# Patient Record
Sex: MALE | Race: WHITE | NOT HISPANIC OR LATINO | Employment: UNEMPLOYED | URBAN - METROPOLITAN AREA
[De-identification: names, ages, dates, MRNs, and addresses within clinical notes are randomized per-mention and may not be internally consistent; named-entity substitution may affect disease eponyms.]

---

## 2019-10-14 ENCOUNTER — HOSPITAL ENCOUNTER (INPATIENT)
Facility: HOSPITAL | Age: 44
LOS: 5 days | DRG: 044 | End: 2019-10-19
Attending: ANESTHESIOLOGY | Admitting: INTERNAL MEDICINE
Payer: COMMERCIAL

## 2019-10-14 ENCOUNTER — HOSPITAL ENCOUNTER (EMERGENCY)
Facility: HOSPITAL | Age: 44
Discharge: OTHER INSTITUTION | End: 2019-10-14
Payer: COMMERCIAL

## 2019-10-14 ENCOUNTER — APPOINTMENT (EMERGENCY)
Dept: CT IMAGING | Facility: HOSPITAL | Age: 44
End: 2019-10-14
Payer: COMMERCIAL

## 2019-10-14 ENCOUNTER — APPOINTMENT (EMERGENCY)
Dept: RADIOLOGY | Facility: HOSPITAL | Age: 44
End: 2019-10-14
Payer: COMMERCIAL

## 2019-10-14 VITALS
HEIGHT: 67 IN | RESPIRATION RATE: 18 BRPM | HEART RATE: 84 BPM | OXYGEN SATURATION: 98 % | SYSTOLIC BLOOD PRESSURE: 198 MMHG | WEIGHT: 152 LBS | DIASTOLIC BLOOD PRESSURE: 112 MMHG | BODY MASS INDEX: 23.86 KG/M2

## 2019-10-14 DIAGNOSIS — G43.909 MIGRAINE: ICD-10-CM

## 2019-10-14 DIAGNOSIS — I72.6 VERTEBRAL ARTERY ANEURYSM (HCC): ICD-10-CM

## 2019-10-14 DIAGNOSIS — I62.9 INTRACRANIAL HEMORRHAGE (HCC): Primary | ICD-10-CM

## 2019-10-14 DIAGNOSIS — R53.1 LEFT-SIDED WEAKNESS: ICD-10-CM

## 2019-10-14 DIAGNOSIS — I61.9 INTRAPARENCHYMAL HEMORRHAGE OF BRAIN (HCC): Primary | ICD-10-CM

## 2019-10-14 DIAGNOSIS — I10 ESSENTIAL HYPERTENSION: ICD-10-CM

## 2019-10-14 DIAGNOSIS — R45.851 SUICIDAL IDEATION: ICD-10-CM

## 2019-10-14 LAB
ALBUMIN SERPL BCP-MCNC: 4.7 G/DL (ref 3.5–5.7)
ALP SERPL-CCNC: 58 U/L (ref 40–150)
ALT SERPL W P-5'-P-CCNC: 26 U/L (ref 7–52)
AMPHETAMINES SERPL QL SCN: NEGATIVE
ANION GAP SERPL CALCULATED.3IONS-SCNC: 8 MMOL/L (ref 4–13)
APTT PPP: 37 SECONDS (ref 23–37)
AST SERPL W P-5'-P-CCNC: 19 U/L (ref 13–39)
BARBITURATES UR QL: NEGATIVE
BENZODIAZ UR QL: NEGATIVE
BILIRUB SERPL-MCNC: 0.6 MG/DL (ref 0.2–1)
BUN SERPL-MCNC: 13 MG/DL (ref 7–25)
CALCIUM SERPL-MCNC: 9.5 MG/DL (ref 8.6–10.5)
CHLORIDE SERPL-SCNC: 105 MMOL/L (ref 98–107)
CO2 SERPL-SCNC: 28 MMOL/L (ref 21–31)
COCAINE UR QL: NEGATIVE
CREAT SERPL-MCNC: 1.18 MG/DL (ref 0.7–1.3)
ERYTHROCYTE [DISTWIDTH] IN BLOOD BY AUTOMATED COUNT: 13.7 % (ref 11.5–14.5)
GFR SERPL CREATININE-BSD FRML MDRD: 76 ML/MIN/1.73SQ M
GLUCOSE SERPL-MCNC: 101 MG/DL (ref 65–99)
GLUCOSE SERPL-MCNC: 89 MG/DL (ref 65–140)
HCT VFR BLD AUTO: 43.6 % (ref 42–47)
HGB BLD-MCNC: 15.3 G/DL (ref 14–18)
INR PPP: 1.03 (ref 0.9–1.5)
MCH RBC QN AUTO: 31.2 PG (ref 26–34)
MCHC RBC AUTO-ENTMCNC: 35.1 G/DL (ref 31–37)
MCV RBC AUTO: 89 FL (ref 81–99)
METHADONE UR QL: NEGATIVE
OPIATES UR QL SCN: NEGATIVE
PCP UR QL: NEGATIVE
PLATELET # BLD AUTO: 147 THOUSANDS/UL (ref 149–390)
PMV BLD AUTO: 7.3 FL (ref 8.6–11.7)
POTASSIUM SERPL-SCNC: 3.6 MMOL/L (ref 3.5–5.5)
PROT SERPL-MCNC: 6.9 G/DL (ref 6.4–8.9)
PROTHROMBIN TIME: 12 SECONDS (ref 10.2–13)
RBC # BLD AUTO: 4.9 MILLION/UL (ref 4.3–5.9)
SODIUM SERPL-SCNC: 141 MMOL/L (ref 134–143)
THC UR QL: NEGATIVE
TROPONIN I SERPL-MCNC: <0.03 NG/ML
WBC # BLD AUTO: 5.2 THOUSAND/UL (ref 4.8–10.8)

## 2019-10-14 PROCEDURE — 36415 COLL VENOUS BLD VENIPUNCTURE: CPT

## 2019-10-14 PROCEDURE — 80307 DRUG TEST PRSMV CHEM ANLYZR: CPT | Performed by: INTERNAL MEDICINE

## 2019-10-14 PROCEDURE — 99221 1ST HOSP IP/OBS SF/LOW 40: CPT | Performed by: ANESTHESIOLOGY

## 2019-10-14 PROCEDURE — 96375 TX/PRO/DX INJ NEW DRUG ADDON: CPT

## 2019-10-14 PROCEDURE — 70496 CT ANGIOGRAPHY HEAD: CPT

## 2019-10-14 PROCEDURE — 70498 CT ANGIOGRAPHY NECK: CPT

## 2019-10-14 PROCEDURE — 85730 THROMBOPLASTIN TIME PARTIAL: CPT

## 2019-10-14 PROCEDURE — 82948 REAGENT STRIP/BLOOD GLUCOSE: CPT

## 2019-10-14 PROCEDURE — 85027 COMPLETE CBC AUTOMATED: CPT

## 2019-10-14 PROCEDURE — 93005 ELECTROCARDIOGRAM TRACING: CPT

## 2019-10-14 PROCEDURE — 71045 X-RAY EXAM CHEST 1 VIEW: CPT

## 2019-10-14 PROCEDURE — 84484 ASSAY OF TROPONIN QUANT: CPT

## 2019-10-14 PROCEDURE — 99291 CRITICAL CARE FIRST HOUR: CPT

## 2019-10-14 PROCEDURE — 85610 PROTHROMBIN TIME: CPT

## 2019-10-14 PROCEDURE — 96365 THER/PROPH/DIAG IV INF INIT: CPT

## 2019-10-14 PROCEDURE — 80053 COMPREHEN METABOLIC PANEL: CPT

## 2019-10-14 RX ORDER — ONDANSETRON 2 MG/ML
1 INJECTION INTRAMUSCULAR; INTRAVENOUS ONCE
Status: COMPLETED | OUTPATIENT
Start: 2019-10-14 | End: 2019-10-14

## 2019-10-14 RX ORDER — ONDANSETRON 2 MG/ML
INJECTION INTRAMUSCULAR; INTRAVENOUS
Status: COMPLETED
Start: 2019-10-14 | End: 2019-10-14

## 2019-10-14 RX ORDER — ACETAMINOPHEN 325 MG/1
650 TABLET ORAL ONCE
Status: DISCONTINUED | OUTPATIENT
Start: 2019-10-14 | End: 2019-10-15

## 2019-10-14 RX ORDER — SODIUM CHLORIDE 9 MG/ML
125 INJECTION, SOLUTION INTRAVENOUS CONTINUOUS
Status: DISCONTINUED | OUTPATIENT
Start: 2019-10-14 | End: 2019-10-14 | Stop reason: HOSPADM

## 2019-10-14 RX ORDER — SODIUM CHLORIDE, SODIUM LACTATE, POTASSIUM CHLORIDE, CALCIUM CHLORIDE 600; 310; 30; 20 MG/100ML; MG/100ML; MG/100ML; MG/100ML
100 INJECTION, SOLUTION INTRAVENOUS CONTINUOUS
Status: DISCONTINUED | OUTPATIENT
Start: 2019-10-14 | End: 2019-10-15

## 2019-10-14 RX ORDER — FENTANYL CITRATE 50 UG/ML
25 INJECTION, SOLUTION INTRAMUSCULAR; INTRAVENOUS
Status: DISCONTINUED | OUTPATIENT
Start: 2019-10-14 | End: 2019-10-16

## 2019-10-14 RX ORDER — ONDANSETRON 2 MG/ML
4 INJECTION INTRAMUSCULAR; INTRAVENOUS EVERY 4 HOURS PRN
Status: DISCONTINUED | OUTPATIENT
Start: 2019-10-14 | End: 2019-10-16

## 2019-10-14 RX ORDER — LABETALOL 20 MG/4 ML (5 MG/ML) INTRAVENOUS SYRINGE
10 ONCE
Status: COMPLETED | OUTPATIENT
Start: 2019-10-14 | End: 2019-10-14

## 2019-10-14 RX ORDER — ACETAMINOPHEN, ASPIRIN AND CAFFEINE 250; 250; 65 MG/1; MG/1; MG/1
2 TABLET, FILM COATED ORAL EVERY 6 HOURS PRN
COMMUNITY
End: 2019-10-19 | Stop reason: HOSPADM

## 2019-10-14 RX ORDER — FENTANYL CITRATE 50 UG/ML
25 INJECTION, SOLUTION INTRAMUSCULAR; INTRAVENOUS ONCE
Status: COMPLETED | OUTPATIENT
Start: 2019-10-14 | End: 2019-10-14

## 2019-10-14 RX ORDER — BUTALBITAL, ACETAMINOPHEN AND CAFFEINE 50; 325; 40 MG/1; MG/1; MG/1
1 TABLET ORAL EVERY 4 HOURS PRN
Status: DISCONTINUED | OUTPATIENT
Start: 2019-10-14 | End: 2019-10-16

## 2019-10-14 RX ADMIN — SODIUM CHLORIDE 5 MG/HR: 0.9 INJECTION, SOLUTION INTRAVENOUS at 17:49

## 2019-10-14 RX ADMIN — SODIUM CHLORIDE, SODIUM LACTATE, POTASSIUM CHLORIDE, AND CALCIUM CHLORIDE 100 ML/HR: .6; .31; .03; .02 INJECTION, SOLUTION INTRAVENOUS at 17:49

## 2019-10-14 RX ADMIN — ONDANSETRON 4 MG: 2 INJECTION INTRAMUSCULAR; INTRAVENOUS at 18:35

## 2019-10-14 RX ADMIN — SODIUM CHLORIDE 7.5 MG/HR: 0.9 INJECTION, SOLUTION INTRAVENOUS at 22:01

## 2019-10-14 RX ADMIN — FENTANYL CITRATE 25 MCG: 50 INJECTION INTRAMUSCULAR; INTRAVENOUS at 18:22

## 2019-10-14 RX ADMIN — SODIUM CHLORIDE 125 ML/HR: 0.9 INJECTION, SOLUTION INTRAVENOUS at 15:16

## 2019-10-14 RX ADMIN — SODIUM CHLORIDE 2.5 MG/HR: 9 INJECTION, SOLUTION INTRAVENOUS at 15:25

## 2019-10-14 RX ADMIN — LEVETIRACETAM 1000 MG: 100 INJECTION, SOLUTION INTRAVENOUS at 15:26

## 2019-10-14 RX ADMIN — LABETALOL 20 MG/4 ML (5 MG/ML) INTRAVENOUS SYRINGE 10 MG: at 15:53

## 2019-10-14 RX ADMIN — LABETALOL 20 MG/4 ML (5 MG/ML) INTRAVENOUS SYRINGE 10 MG: at 15:42

## 2019-10-14 RX ADMIN — FENTANYL CITRATE 25 MCG: 50 INJECTION INTRAMUSCULAR; INTRAVENOUS at 21:58

## 2019-10-14 NOTE — H&P
History and Physical - Critical Care   Karen Fragoso 45 y o  male MRN: [id number]  Unit/Bed#: ICU 03 Encounter: [id number]    Reason for Admission / Chief Complaint: Left-sided facial droop and arm weakness    History of Present Illness:  Karen Fragoso is a 45 y o  male with a past medical history of migraine headaches and depression who presents as a transfer from the [address] [place] [address] Emergency Department  Patient originally presented for evaluation of acute onset left-sided facial weakness and left arm weakness of 20 minutes' duration  Stated he "Didn't feel right    Something was wrong" while lifting weights this morning  Upon initial evaluation, patient stated that symptoms were unchanged from onset  Denied ever experiencing similar symptoms  Denied loss of consciousness, fevers, headaches, chest pain, shortness of breath, nausea, vomiting, and abdominal pain  Denied current alcohol use; denied ever using tobacco products or recreational drugs  Endorses family history of hypertension in sister  Initial vital signs: HR 94, RR 16, /129, 98% O2 saturation on room air  Initial laboratory was unremarkable; PT, PTT, and INR within normal limits; negative troponin  Initial chest x-ray revealed no acute cardiopulmonary disease  CT headrevealed acute intraparenchymal hemorrhage centered at the right lentiform nucleus with mild surrounding vasogenic edema; no significant midline shift  CTA head/neck unremarkable  NIHSS score of 2; ICH score of 0  Patient was subsequently transferred to [facility] for evaluation and management of intraparenchymal brain hemorrhage  Currently endorses right eye pain with associated tearing (not the same as his usual headaches) and drowsiness   Denies headaches, fevers, chills, vision changes, lightheadedness, dizziness, chest pain, and shortness of breath    ______________________________________________________________________    Assessment:  Principal Problem: Intraparenchymal hemorrhage of brain (HCC)  Active Problems:    Left-sided weakness  Resolved Problems:    * No resolved hospital problems  *    Plan:    Neuro: Intraparenchymal hemorrhage   - Likely secondary to prolonged uncontrolled hypertension   - Denies smoking history and recreational drug use   - Alert and oriented x4; GCS 15  - NIHSS 9, ICH 0  - Several left-sided neurological deficits (detailed in physical exam)  - Failed initial dysphagia assessment; keep NPO until formal speech evaluation   - 1x dose of IV fentanyl for headache   - Nicardipine infusion for SBP <140   - Lipid panel, HbA1c  - Repeat CT head without contrast at 24 hours   - Q1 neuro checks   - Neurology and neurosurgery consulted   - PT/OT evaluation when appropriate     CV: No acute issues   - Nicardipine infusion for SBP <140   - Will require education on tight blood pressure control as an outpatient     Pulm: No acute issues   - Currently saturating 98% on room air   - Incentive spirometry     GI: No acute issues   - Last bowel movement yesterday   - Denies diarrhea and constipation     : No acute issues   - Able to urinate   - Does not require catheterization     F/E/N: No acute issues   - Currently NPO following bedside dysphagia assessment   - Requires formal speech evaluation   - Lactated ringers infusion 100 ml/hr    ID: No acute issues   - Afebrile, no leukocytosis   - No recent illnesses   - Has not received flu vaccination     Heme: No acute issues   - No known hematologic conditions   - PT, PTT, and INR within normal limits     Endo: No acute issues   - No history of diabetes   - Initial blood glucose 101  - HbA1c pending     Msk/Skin: No acute issues   - Frequent repositioning   - Routine ulcer prophylaxis         Disposition: continue critical care monitoring         Counseling / Coordination of Care  Total Critical Care time spent 20 minutes excluding procedures, teaching and family updates  ______________________________________________________________________  Past Medical History:  Past Medical History:   Diagnosis Date    Depression     Migraine      Past Surgical History:  Past Surgical History:   Procedure Laterality Date    LITHOTRIPSY       Past Family History:  No family history on file  Social History:  Social History     Tobacco Use   Smoking Status Never Smoker   Smokeless Tobacco Never Used     Social History     Substance and Sexual Activity   Alcohol Use Not Currently     Social History     Substance and Sexual Activity   Drug Use Never     Medications:  Current Facility-Administered Medications   Medication Dose Route Frequency    ondansetron (ZOFRAN) 4 mg/2 mL injection **ADS Override Pull**        niCARdipine (CARDENE) 25 mg (STANDARD CONCENTRATION) in sodium chloride 0 9% 250 mL  1-15 mg/hr Intravenous Titrated    ondansetron (FOR EMS ONLY) (ZOFRAN) 4 mg/2 mL injection 4 mg  1 each Does not apply Once     Home medications:  Prior to Admission medications    Medication Sig Start Date End Date Taking? Authorizing Provider   aspirin-acetaminophen-caffeine (Catha Mirna) [phone] MG per tablet Take 2 tablets by mouth every 6 (six) hours as needed for headaches    Historical Provider, MD     Allergies:  No Known Allergies    ROS: Negative except as noted above     Vitals: There were no vitals filed for this visit  Temperature:   No data recorded  Weights: There is no height or weight on file to calculate BMI  Non-Invasive/Invasive Ventilation Settings:  Respiratory    Lab Data (Last 4 hours)    None         O2/Vent Data (Last 4 hours)    None               Physical Exam:  Physical Exam   Constitutional: He is oriented to person, place, and time  He appears well-developed and well-nourished  He appears distressed  Mild dysarthria noted    HENT:   Head: Normocephalic and atraumatic  Mouth/Throat: Oropharynx is clear and moist  No oropharyngeal exudate  Eyes: Pupils are equal, round, and reactive to light  Conjunctivae and EOM are normal  No scleral icterus  Neck: Normal range of motion  Neck supple  Cardiovascular: Normal rate, regular rhythm, normal heart sounds and intact distal pulses  Exam reveals no gallop and no friction rub  No murmur heard  Bounding peripheral pulses palpable bilaterally    Pulmonary/Chest: Effort normal and breath sounds normal  No stridor  No respiratory distress  He has no wheezes  Abdominal: Soft  Bowel sounds are normal  He exhibits no distension and no mass  There is no tenderness  Musculoskeletal: He exhibits no edema or tenderness  Neurological: He is alert and oriented to person, place, and time  He displays abnormal reflex  A cranial nerve deficit and sensory deficit is present  Bilateral left visual field loss; complete loss of sensation over left forehead, cheek, and chin; reduced tone of left masseter and temporalis muscles; left facial droop with inability to raise left eye brow, wrinkle left forehead, and squeeze left eye shut; left-sided tongue deviation (unable to deviate tongue to right side; weak shoulder shrug on left side; flaccid paralysis of left upper extremity with complete sensation loss; 3/5 left lower extremity strength, complete sensation loss of left lower extremity, and left-sided patellar hyperreflexia; able to wiggle left toes slightly on command    Skin: Skin is warm and dry  Capillary refill takes 2 to 3 seconds  No rash noted  He is not diaphoretic  No erythema        Labs:  Results from last 7 days   Lab Units 10/14/19  1500   WBC Thousand/uL 5 20   HEMOGLOBIN g/dL 15 3   HEMATOCRIT % 43 6   PLATELETS Thousands/uL 147*     Results from last 7 days   Lab Units 10/14/19  1500   SODIUM mmol/L 141   POTASSIUM mmol/L 3 6   CHLORIDE mmol/L 105   CO2 mmol/L 28   ANION GAP mmol/L 8   BUN mg/dL 13   CREATININE mg/dL 1 18   CALCIUM mg/dL 9 5   GLUCOSE RANDOM mg/dL 101*   ALT U/L 26   AST U/L 19 ALK PHOS U/L 58   ALBUMIN g/dL 4 7   TOTAL BILIRUBIN mg/dL 0 60     Results from last 7 days   Lab Units 10/14/19  1500   INR  1 03   PTT seconds 37      Results from last 7 days   Lab Units 10/14/19  1500   TROPONIN I ng/mL <0 03     Imaging:     (10/14/2019) chest x-ray: No acute cardiopulmonary disease     (10/14/2019) CT stroke alert brain: Acute intraparenchymal hemorrhage centered at the right lentiform nucleus with mild surrounding vasogenic edema; no significant midline shift     (10/14/2019) CTA stroke alert (head/neck): Unremarkable CT angiogram of the brain; no CTA spot sign; unremarkable CT angiogram of the neck     EKG: Non-specific ST segment and T wave changes    Micro: None at this time    VTE Pharmacologic Prophylaxis: Pharmacologic VTE Prophylaxis contraindicated due to intraparenchymal hemorrhage   VTE Mechanical Prophylaxis: sequential compression device    Invasive lines and devices: Invasive Devices     Peripheral Intravenous Line            Peripheral IV 10/14/19 Left Forearm less than 1 day    Peripheral IV 10/14/19 Right Antecubital less than 1 day              Code Status: Level 1 - Full Code     Given critical illness, patient length of stay will require greater than two midnights  Portions of the record may have been created with voice recognition software  Occasional wrong word or "sound a like" substitutions may have occurred due to the inherent limitations of voice recognition software  Read the chart carefully and recognize, using context, where substitutions have occurred      Angela Owusu MD

## 2019-10-14 NOTE — ED PROVIDER NOTES
History  Chief Complaint   Patient presents with   [address]     last known well 1 hour ago, left facial droop, slurred speech     Yanick Hyde is a 79-year-old male who came to the emergency department due to left-sided facial weakness and left arm weakness with started about 20 minutes prior to arrival   Patient has known history of hypertension and depression  History provided by:  Patient   used: No    STROKE Alert   Location:  Left-sided facial droop and left arm weakness  Quality:  As above  Severity:  Mild  Onset quality:  Sudden  Duration:  20 minutes  Timing:  Constant  Progression:  Unchanged  Chronicity:  New  Associated symptoms: no abdominal pain, no chest pain, no congestion, no cough, no diarrhea, no ear pain, no fatigue, no fever, no headaches, no loss of consciousness, no myalgias, no nausea, no rash, no rhinorrhea, no shortness of breath, no sore throat, no vomiting and no wheezing        Prior to Admission Medications   Prescriptions Last Dose Informant Patient Reported? Taking?   aspirin-acetaminophen-caffeine (Christiano Bellamy) [phone] MG per tablet 10/13/2019 at Unknown time Self Yes Yes   Sig: Take 2 tablets by mouth every 6 (six) hours as needed for headaches      Facility-Administered Medications: None       Past Medical History:   Diagnosis Date    Depression     Migraine        Past Surgical History:   Procedure Laterality Date    LITHOTRIPSY         History reviewed  No pertinent family history  I have reviewed and agree with the history as documented  Social History     Tobacco Use    Smoking status: Never Smoker    Smokeless tobacco: Never Used   Substance Use Topics    Alcohol use: Not Currently    Drug use: Never        Review of Systems   Constitutional: Negative for fatigue and fever  HENT: Negative for congestion, ear pain, rhinorrhea and sore throat  Eyes: Negative      Respiratory: Negative for cough, shortness of breath and wheezing  Cardiovascular: Negative for chest pain  Gastrointestinal: Negative for abdominal pain, diarrhea, nausea and vomiting  Endocrine: Negative  Genitourinary: Negative  Musculoskeletal: Negative for myalgias  Skin: Negative for rash  Allergic/Immunologic: Negative  Neurological: Positive for facial asymmetry and weakness  Negative for loss of consciousness and headaches  Hematological: Negative  Psychiatric/Behavioral: Negative  Physical Exam  Physical Exam   Constitutional: He is oriented to person, place, and time  He appears well-developed and well-nourished  No distress  HENT:   Head: Normocephalic and atraumatic  Right Ear: External ear normal    Left Ear: External ear normal    Nose: Nose normal    Mouth/Throat: Oropharynx is clear and moist  No oropharyngeal exudate  Eyes: Pupils are equal, round, and reactive to light  Conjunctivae and EOM are normal  Right eye exhibits no discharge  Left eye exhibits no discharge  No scleral icterus  Neck: Normal range of motion  Neck supple  No tracheal deviation present  No thyromegaly present  Cardiovascular: Normal rate, regular rhythm and normal heart sounds  Pulmonary/Chest: Effort normal and breath sounds normal  No stridor  No respiratory distress  Abdominal: Soft  Bowel sounds are normal  He exhibits no distension  There is no tenderness  Musculoskeletal: Normal range of motion  He exhibits no edema, tenderness or deformity  Lymphadenopathy:     He has no cervical adenopathy  Neurological: He is alert and oriented to person, place, and time  No sensory deficit  Skin: Skin is warm and dry  No rash noted  He is not diaphoretic  No erythema  No pallor  Psychiatric: He has a normal mood and affect  His behavior is normal  Judgment and thought content normal    Nursing note and vitals reviewed        Vital Signs  ED Triage Vitals [10/14/19 1511]   Temp Pulse Respirations Blood Pressure SpO2   -- 94 16 (!) 208/129 98 %      Temp src Heart Rate Source Patient Position - Orthostatic VS BP Location FiO2 (%)   -- Monitor Lying Left arm --      Pain Score       5           Vitals:    10/14/19 1530 10/14/19 1536 10/14/19 1543 10/14/19 1545   BP:  (!) 210/116 (!) 198/112    Pulse: 85 82 88 84   Patient Position - Orthostatic VS:  Lying Lying          Visual Acuity      ED Medications  Medications   sodium chloride 0 9 % infusion (125 mL/hr Intravenous New Bag 10/14/19 1516)   iohexol (OMNIPAQUE) 350 MG/ML injection (MULTI-DOSE) 85 mL (has no administration in time range)   niCARdipine (CARDENE) 25 mg (STANDARD CONCENTRATION) in sodium chloride 0 9% 250 mL (5 mg/hr Intravenous Rate/Dose Change 10/14/19 1545)   levETIRAcetam (KEPPRA) 1,000 mg in sodium chloride 0 9 % 100 mL IVPB (0 mg Intravenous Stopped 10/14/19 1549)   Labetalol HCl (NORMODYNE) injection 10 mg (10 mg Intravenous Given 10/14/19 1542)   Labetalol HCl (NORMODYNE) injection 10 mg (10 mg Intravenous Given 10/14/19 1553)       Diagnostic Studies  Results Reviewed     Procedure Component Value Units Date/Time    Troponin I [[id number]]  (Normal) Collected:  10/14/19 1500    Lab Status:  Final result Specimen:  Blood Updated:  10/14/19 1521     Troponin I <0 03 ng/mL     Comprehensive metabolic panel [[id number]]  (Abnormal) Collected:  10/14/19 1500    Lab Status:  Final result Specimen:  Blood Updated:  10/14/19 1517     Sodium 141 mmol/L      Potassium 3 6 mmol/L      Chloride 105 mmol/L      CO2 28 mmol/L      ANION GAP 8 mmol/L      BUN 13 mg/dL      Creatinine 1 18 mg/dL      Glucose 101 mg/dL      Calcium 9 5 mg/dL      AST 19 U/L      ALT 26 U/L      Alkaline Phosphatase 58 U/L      Total Protein 6 9 g/dL      Albumin 4 7 g/dL      Total Bilirubin 0 60 mg/dL      eGFR 76 ml/min/1 73sq m     Narrative:       [place] guidelines for Chronic Kidney Disease (CKD):     Stage 1 with normal or high GFR (GFR > 90 mL/min/1 73 square meters)    Stage 2 Mild CKD (GFR = 60-89 mL/min/1 73 square meters)    Stage 3A Moderate CKD (GFR = 45-59 mL/min/1 73 square meters)    Stage 3B Moderate CKD (GFR = 30-44 mL/min/1 73 square meters)    Stage 4 Severe CKD (GFR = 15-29 mL/min/1 73 square meters)    Stage 5 End Stage CKD (GFR <15 mL/min/1 73 square meters)  Note: GFR calculation is accurate only with a steady state creatinine    Protime-INR [[id number]]  (Normal) Collected:  10/14/19 1500    Lab Status:  Final result Specimen:  Blood Updated:  10/14/19 1510     Protime 12 0 seconds      INR 1 03    APTT [[id number]]  (Normal) Collected:  10/14/19 1500    Lab Status:  Final result Specimen:  Blood Updated:  10/14/19 1510     PTT 37 seconds     CBC and Platelet [[id number]]  (Abnormal) Collected:  10/14/19 1500    Lab Status:  Final result Specimen:  Blood Updated:  10/14/19 1504     WBC 5 20 Thousand/uL      RBC 4 90 Million/uL      Hemoglobin 15 3 g/dL      Hematocrit 43 6 %      MCV 89 fL      MCH 31 2 pg      MCHC 35 1 g/dL      RDW 13 7 %      Platelets 371 Thousands/uL      MPV 7 3 fL     Fingerstick Glucose (POCT) [[id number]]  (Normal) Collected:  10/14/19 1445    Lab Status:  Final result Updated:  10/14/19 1446     POC Glucose 89 mg/dl                  X-ray chest 1 view portable   ED Interpretation by Pedro Luis Freitas MD (10/14 6715)   No infiltrate      CTA stroke alert (head/neck)   Final Result by Jose Luis Yoder MD (10/14 8504)      1  Unremarkable CT angiogram of the brain  No CTA spot sign  2   Unremarkable CT angiogram of the neck  Findings were directly discussed with Dr James Lu on 10/14/2019 3:20 PM                      Workstation performed: [id number]         CT stroke alert brain   Final Result by Jose Luis Yoder MD (10/14 0265)      Acute intraparenchymal hemorrhage centered at the right lentiform nucleus with mild surrounding vasogenic edema  No significant mass effect or midline shift              Findings were directly discussed with GRAHAM NAVARRO on 10/14/2019 2:59 PM       Workstation performed: [id number]                    Procedures  ECG 12 Lead Documentation Only  Date/Time: 10/14/2019 3:23 PM  Performed by: Zackery Zaman MD  Authorized by: Zackery Zaman MD     Indications / Diagnosis:  Intracranial bleed  ECG reviewed by me, the ED Provider: yes    Patient location:  ED  Previous ECG:     Previous ECG:  Unavailable    Comparison to cardiac monitor: Yes    Interpretation:     Interpretation: non-specific    Rate:     ECG rate:  95 beats per minute    ECG rate assessment: normal    Rhythm:     Rhythm: sinus rhythm    Ectopy:     Ectopy: none    QRS:     QRS axis:  Normal    QRS intervals:  Normal  Conduction:     Conduction: normal    ST segments:     ST segments:  Non-specific  T waves:     T waves: non-specific      CriticalCare Time  Performed by: Zackery Zaman MD  Authorized by: Zackery Zaman MD     Critical care provider statement:     Critical care time (minutes):  90    Critical care start time:  10/14/2019 2:41 PM    Critical care end time:  10/14/2019 4:05 PM    Critical care time was exclusive of:  Separately billable procedures and treating other patients    Critical care was necessary to treat or prevent imminent or life-threatening deterioration of the following conditions:  CNS failure or compromise    Critical care was time spent personally by me on the following activities:  Blood draw for specimens, obtaining history from patient or surrogate, development of treatment plan with patient or surrogate, discussions with consultants, evaluation of patient's response to treatment, examination of patient, ordering and performing treatments and interventions, ordering and review of laboratory studies, ordering and review of radiographic studies, re-evaluation of patient's condition and review of old charts    I assumed direction of critical care for this patient from another provider in my specialty: no ED Course  ED Course as of Oct 14 1606   Mon Oct 14, 2019   1447 Discussed with Dr Alexandra Mo, TeleNeurologist on call  [phone] Discussed with Dr Alexandra Mo, who saw the the CT Head and revealed bleed on the right basal ganglia  [phone] [facility] was called for transportation arrangements  [address] Discussed with Dr Alana Lyons, [address] and Accepting MD at [facility]                  Stroke Assessment     Row Name 10/14/19 1450             NIH Stroke Scale    Interval        Level of Consciousness (1a )  0      LOC Questions (1b )  0      LOC Commands (1c )  0      Best Gaze (2 )  0      Visual (3 )  0      Facial Palsy (4 )  1      Motor Arm, Left (5a )  1      Motor Arm, Right (5b )  0      Motor Leg, Left (6a )  0      Motor Leg, Right (6b )  0      Limb Ataxia (7 )  0      Sensory (8 )  0      Best Language (9 )  0      Dysarthria (10 )  0      Extinction and Inattention (11 ) (Formerly Neglect)  0      Total  2                          MDM  Number of Diagnoses or Management Options  Intracranial hemorrhage (HCC): new and requires workup     Amount and/or Complexity of Data Reviewed  Clinical lab tests: ordered and reviewed  Tests in the radiology section of CPT®: ordered and reviewed  Tests in the medicine section of CPT®: ordered and reviewed  Discussion of test results with the performing providers: yes  Decide to obtain previous medical records or to obtain history from someone other than the patient: yes  Obtain history from someone other than the patient: yes  Review and summarize past medical records: yes  Discuss the patient with other providers: yes  Independent visualization of images, tracings, or specimens: yes    Risk of Complications, Morbidity, and/or Mortality  Presenting problems: high  Diagnostic procedures: high  Management options: high    Patient Progress  Patient progress: other (comment) (Critical)      Disposition  Final diagnoses: Intracranial hemorrhage ([facility] [address] )     Time reflects when diagnosis was documented in both MDM as applicable and the Disposition within this note     Time User Action Codes Description Comment    10/14/2019  3:32 PM Feli Cuevas Add [I62 9] Intracranial hemorrhage [facility])       ED Disposition     ED Disposition Condition Date/Time Comment    Transfer to Another [facility] Oct 14, 2019  3:32 PM Angel Pimentel should be transferred out to Dr Girma Gutierrez accepting  MD Documentation      Most Recent Value   Patient Condition  An emergency transfer is being made prior to stabilization due to the need for definitive care and the benefit of transfer outweighs the risk   Reason for Transfer  Level of Care needed not available at this facility [facility]]   Benefits of Transfer  Specialized equipment and/or services available at the receiving facility (Include comment)________________________ [facility]]   Risks of Transfer  Potential for delay in receiving treatment, Potential deterioration of medical condition, Loss of IV, Increased discomfort during transfer, Possible worsening of condition or death during transfer   Accepting Physician  Dr Rosalie Mora Name, Garry Forbes   Sending JUWAN Ruffin     Provider Certification  General risk, such as traffic hazards, adverse weather conditions, rough terrain or turbulence, possible failure of equipment (including vehicle or aircraft), or consequences of actions of persons outside the control of the transport personnel, Unanticipated needs of medical equipment and personnel during transport, Risk of worsening condition, The possibility of a transport vehicle being unavailable      RN Documentation      Most [facility] Name, Garry Forbes   Level of Care  Advanced life support Follow-up Information    None         Patient's Medications   Discharge Prescriptions    No medications on file     No discharge procedures on file      ED Provider  Electronically Signed by           Annette Rodríguez MD  10/14/19 2469

## 2019-10-14 NOTE — QUICK NOTE
Stroke alert activated by Dr Jessika Dallas ([phone]) at [facility] ED via PACs  Patient is 45 with HTN  He came to ED with complaints of new onset L face and arm weakness, that began 20minutes prior to arrival to ED  Exam:   /129  Awake, alert, giving history  L facial, no other cranial nerve abnormalities  L arm pronator drift  No drift in any other extremities  CTH images reviewed: acute intraparenchymal hemorrhage in R lentiform nucleus 4 4x1 7x2 5cm, about 10cc  CTA images reviewed: unremarkable  A/P:   45year old man with HTN presenting with R basal ganglia hemorrhage, possibly secondary to HTN  Recommended BP lowering to goal of 140-160  No antiplatelet or anticoagulation use  Would obtain repeat CTH for stability, and MRI brain to assess for any underlying structural lesions

## 2019-10-14 NOTE — EMTALA/ACUTE CARE TRANSFER
[facility]  [address] [facility] [id number] [phone]  Dept: [phone]      EMTALA TRANSFER CONSENT    NAME Carlene BISWAS 1975                              MRN [id number]    I have been informed of my rights regarding examination, treatment, and transfer   by Dr Zackery Zaman MD    Benefits: Specialized equipment and/or services available at the receiving facility (Include comment)________________________(Critical Care Neurology)    Risks: Potential for delay in receiving treatment, Potential deterioration of medical condition, Loss of IV, Increased discomfort during transfer, Possible worsening of condition or death during transfer      Transfer Request   I acknowledge that my medical condition has been evaluated and explained to me by the emergency department physician or other qualified medical person and/or my attending physician who has recommended and offered to me further medical examination and treatment  I understand the Hospital's obligation with respect to the treatment and stabilization of my emergency medical condition  I nevertheless request to be transferred  I release the Hospital, the doctor, and any other persons caring for me from all responsibility or liability for any injury or ill effects that may result from my transfer and agree to accept all responsibility for the consequences of my choice to transfer, rather than receive stabilizing treatment at the Hospital  I understand that because the transfer is my request, my insurance may not provide reimbursement for the services  The Hospital will assist and direct me and my family in how to make arrangements for transfer, but the hospital is not liable for any fees charged by the transport service    In spite of this understanding, I refuse to consent to further medical examination and treatment which has been offered to me, and request transfer to  [address], [address] : [address] [place] [address]  I authorize the performance of emergency medical procedures and treatments upon me in both transit and upon arrival at the receiving facility  Additionally, I authorize the release of any and all medical records to the receiving facility and request they be transported with me, if possible  I authorize the performance of emergency medical procedures and treatments upon me in both transit and upon arrival at the receiving facility  Additionally, I authorize the release of any and all medical records to the receiving facility and request they be transported with me, if possible  I understand that the safest mode of transportation during a medical emergency is an ambulance and that the Hospital advocates the use of this mode of transport  Risks of traveling to the receiving facility by car, including absence of medical control, life sustaining equipment, such as oxygen, and medical personnel has been explained to me and I fully understand them  (MERLYN CORRECT BOX BELOW)  [ X ]  I consent to the stated transfer and to be transported by ambulance/helicopter  [  ]  I consent to the stated transfer, but refuse transportation by ambulance and accept full responsibility for my transportation by car  I understand the risks of non-ambulance transfers and I exonerate the Hospital and its staff from any deterioration in my condition that results from this refusal     X___________________________________________    DATE  10/14/19  TIME________  Signature of patient or legally responsible individual signing on patient behalf           RELATIONSHIP TO PATIENT_________________________          Provider Certification    NAME Pooja [facility] 1975                              MRN [id number]    A medical screening exam was performed on the above named patient    Based on the examination:    Condition Necessitating Transfer The encounter diagnosis was Intracranial hemorrhage ([facility] [address] )  Patient Condition: An emergency transfer is being made prior to stabilization due to the need for definitive care and the benefit of transfer outweighs the risk    Reason for Transfer: Level of Care needed not available at this facility(Critical Care Neurology)    Transfer Requirements: [address] [place], [place]   · Space available and qualified personnel available for treatment as acknowledged by    · Agreed to accept transfer and to provide appropriate medical treatment as acknowledged by       Dr Michael Dietrich  · Appropriate medical records of the examination and treatment of the patient are provided at the time of transfer   [address] [facility], [address] _______  · Transfer will be performed by qualified personnel from    and appropriate transfer equipment as required, including the use of necessary and appropriate life support measures      Provider Certification: I have examined the patient and explained the following risks and benefits of being transferred/refusing transfer to the patient/family:  General risk, such as traffic hazards, adverse weather conditions, rough terrain or turbulence, possible failure of equipment (including vehicle or aircraft), or consequences of actions of persons outside the control of the transport personnel, Unanticipated needs of medical equipment and personnel during transport, Risk of worsening condition, The possibility of a transport vehicle being unavailable      Based on these reasonable risks and benefits to the patient and/or the unborn child(toma), and based upon the information available at the time of the patients examination, I certify that the medical benefits reasonably to be expected from the provision of appropriate medical treatments at another medical facility outweigh the increasing risks, if any, to the individuals medical condition, and in the case of labor to the unborn child, from effecting the transfer      X                 GRAHAM Narayan MD               DATE 10/14/19        TIME_______      ORIGINAL - SEND TO MEDICAL RECORDS   COPY - SEND WITH PATIENT DURING TRANSFER

## 2019-10-15 ENCOUNTER — APPOINTMENT (INPATIENT)
Dept: RADIOLOGY | Facility: HOSPITAL | Age: 44
DRG: 044 | End: 2019-10-15
Payer: COMMERCIAL

## 2019-10-15 PROBLEM — R45.851 SUICIDAL IDEATION: Status: ACTIVE | Noted: 2019-10-15

## 2019-10-15 PROBLEM — F32.A DEPRESSION: Status: ACTIVE | Noted: 2019-10-15

## 2019-10-15 LAB
ANION GAP SERPL CALCULATED.3IONS-SCNC: 10 MMOL/L (ref 4–13)
ATRIAL RATE: 95 BPM
BUN SERPL-MCNC: 9 MG/DL (ref 5–25)
CALCIUM SERPL-MCNC: 8.8 MG/DL (ref 8.3–10.1)
CHLORIDE SERPL-SCNC: 104 MMOL/L (ref 100–108)
CHOLEST SERPL-MCNC: 126 MG/DL (ref 50–200)
CO2 SERPL-SCNC: 23 MMOL/L (ref 21–32)
CREAT SERPL-MCNC: 0.87 MG/DL (ref 0.6–1.3)
EST. AVERAGE GLUCOSE BLD GHB EST-MCNC: 74 MG/DL
GFR SERPL CREATININE-BSD FRML MDRD: 106 ML/MIN/1.73SQ M
GLUCOSE SERPL-MCNC: 107 MG/DL (ref 65–140)
HBA1C MFR BLD: 4.2 % (ref 4.2–6.3)
HCT VFR BLD AUTO: 42.4 % (ref 36.5–49.3)
HDLC SERPL-MCNC: 49 MG/DL (ref 40–60)
HGB BLD-MCNC: 15.2 G/DL (ref 12–17)
LDLC SERPL CALC-MCNC: 71 MG/DL (ref 0–100)
MAGNESIUM SERPL-MCNC: 1.9 MG/DL (ref 1.6–2.6)
P AXIS: 63 DEGREES
PHOSPHATE SERPL-MCNC: 3.1 MG/DL (ref 2.7–4.5)
POTASSIUM SERPL-SCNC: 3.6 MMOL/L (ref 3.5–5.3)
PR INTERVAL: 154 MS
QRS AXIS: 68 DEGREES
QRSD INTERVAL: 84 MS
QT INTERVAL: 344 MS
QTC INTERVAL: 432 MS
SODIUM SERPL-SCNC: 137 MMOL/L (ref 136–145)
T WAVE AXIS: -25 DEGREES
TRIGL SERPL-MCNC: 31 MG/DL
VENTRICULAR RATE: 95 BPM

## 2019-10-15 PROCEDURE — 80061 LIPID PANEL: CPT | Performed by: INTERNAL MEDICINE

## 2019-10-15 PROCEDURE — 93010 ELECTROCARDIOGRAM REPORT: CPT | Performed by: INTERNAL MEDICINE

## 2019-10-15 PROCEDURE — G8997 SWALLOW GOAL STATUS: HCPCS

## 2019-10-15 PROCEDURE — G8978 MOBILITY CURRENT STATUS: HCPCS

## 2019-10-15 PROCEDURE — 84100 ASSAY OF PHOSPHORUS: CPT | Performed by: INTERNAL MEDICINE

## 2019-10-15 PROCEDURE — 83036 HEMOGLOBIN GLYCOSYLATED A1C: CPT | Performed by: INTERNAL MEDICINE

## 2019-10-15 PROCEDURE — 99253 IP/OBS CNSLTJ NEW/EST LOW 45: CPT | Performed by: NEUROLOGICAL SURGERY

## 2019-10-15 PROCEDURE — G8979 MOBILITY GOAL STATUS: HCPCS

## 2019-10-15 PROCEDURE — G8987 SELF CARE CURRENT STATUS: HCPCS

## 2019-10-15 PROCEDURE — 97163 PT EVAL HIGH COMPLEX 45 MIN: CPT

## 2019-10-15 PROCEDURE — 80048 BASIC METABOLIC PNL TOTAL CA: CPT | Performed by: INTERNAL MEDICINE

## 2019-10-15 PROCEDURE — 92610 EVALUATE SWALLOWING FUNCTION: CPT

## 2019-10-15 PROCEDURE — 99254 IP/OBS CNSLTJ NEW/EST MOD 60: CPT | Performed by: PSYCHIATRY & NEUROLOGY

## 2019-10-15 PROCEDURE — 70551 MRI BRAIN STEM W/O DYE: CPT

## 2019-10-15 PROCEDURE — G8988 SELF CARE GOAL STATUS: HCPCS

## 2019-10-15 PROCEDURE — G8996 SWALLOW CURRENT STATUS: HCPCS

## 2019-10-15 PROCEDURE — 97167 OT EVAL HIGH COMPLEX 60 MIN: CPT

## 2019-10-15 PROCEDURE — 99233 SBSQ HOSP IP/OBS HIGH 50: CPT | Performed by: ANESTHESIOLOGY

## 2019-10-15 PROCEDURE — 70450 CT HEAD/BRAIN W/O DYE: CPT

## 2019-10-15 PROCEDURE — 99221 1ST HOSP IP/OBS SF/LOW 40: CPT | Performed by: PSYCHIATRY & NEUROLOGY

## 2019-10-15 PROCEDURE — 83735 ASSAY OF MAGNESIUM: CPT | Performed by: INTERNAL MEDICINE

## 2019-10-15 PROCEDURE — 85014 HEMATOCRIT: CPT | Performed by: STUDENT IN AN ORGANIZED HEALTH CARE EDUCATION/TRAINING PROGRAM

## 2019-10-15 PROCEDURE — 85018 HEMOGLOBIN: CPT | Performed by: STUDENT IN AN ORGANIZED HEALTH CARE EDUCATION/TRAINING PROGRAM

## 2019-10-15 RX ORDER — ACETAMINOPHEN 325 MG/1
650 TABLET ORAL EVERY 6 HOURS PRN
Status: DISCONTINUED | OUTPATIENT
Start: 2019-10-15 | End: 2019-10-15

## 2019-10-15 RX ORDER — HYDRALAZINE HYDROCHLORIDE 20 MG/ML
5 INJECTION INTRAMUSCULAR; INTRAVENOUS EVERY 4 HOURS PRN
Status: DISCONTINUED | OUTPATIENT
Start: 2019-10-15 | End: 2019-10-15

## 2019-10-15 RX ORDER — ACETAMINOPHEN 325 MG/1
975 TABLET ORAL 3 TIMES DAILY
Status: DISCONTINUED | OUTPATIENT
Start: 2019-10-15 | End: 2019-10-16

## 2019-10-15 RX ORDER — LABETALOL 20 MG/4 ML (5 MG/ML) INTRAVENOUS SYRINGE
10 EVERY 6 HOURS PRN
Status: DISCONTINUED | OUTPATIENT
Start: 2019-10-15 | End: 2019-10-15

## 2019-10-15 RX ORDER — PROMETHAZINE HYDROCHLORIDE 25 MG/ML
25 INJECTION, SOLUTION INTRAMUSCULAR; INTRAVENOUS EVERY 6 HOURS PRN
Status: DISCONTINUED | OUTPATIENT
Start: 2019-10-15 | End: 2019-10-16

## 2019-10-15 RX ORDER — HYDRALAZINE HYDROCHLORIDE 20 MG/ML
10 INJECTION INTRAMUSCULAR; INTRAVENOUS EVERY 4 HOURS PRN
Status: DISCONTINUED | OUTPATIENT
Start: 2019-10-15 | End: 2019-10-19 | Stop reason: HOSPADM

## 2019-10-15 RX ORDER — LABETALOL 20 MG/4 ML (5 MG/ML) INTRAVENOUS SYRINGE
10 EVERY 4 HOURS PRN
Status: DISCONTINUED | OUTPATIENT
Start: 2019-10-15 | End: 2019-10-16

## 2019-10-15 RX ORDER — LABETALOL 20 MG/4 ML (5 MG/ML) INTRAVENOUS SYRINGE
Status: COMPLETED
Start: 2019-10-15 | End: 2019-10-15

## 2019-10-15 RX ORDER — LISINOPRIL 5 MG/1
5 TABLET ORAL DAILY
Status: DISCONTINUED | OUTPATIENT
Start: 2019-10-15 | End: 2019-10-15

## 2019-10-15 RX ORDER — PROMETHAZINE HYDROCHLORIDE 25 MG/ML
25 INJECTION, SOLUTION INTRAMUSCULAR; INTRAVENOUS EVERY 6 HOURS PRN
Status: DISCONTINUED | OUTPATIENT
Start: 2019-10-15 | End: 2019-10-15

## 2019-10-15 RX ORDER — HYDRALAZINE HYDROCHLORIDE 20 MG/ML
5 INJECTION INTRAMUSCULAR; INTRAVENOUS ONCE AS NEEDED
Status: DISCONTINUED | OUTPATIENT
Start: 2019-10-15 | End: 2019-10-16

## 2019-10-15 RX ORDER — OLANZAPINE 2.5 MG/1
2.5 TABLET ORAL
Status: DISCONTINUED | OUTPATIENT
Start: 2019-10-15 | End: 2019-10-19 | Stop reason: HOSPADM

## 2019-10-15 RX ORDER — CITALOPRAM 20 MG/1
20 TABLET ORAL DAILY
Status: ON HOLD | COMMUNITY
End: 2019-10-23 | Stop reason: CLARIF

## 2019-10-15 RX ORDER — AMLODIPINE BESYLATE 5 MG/1
5 TABLET ORAL DAILY
Status: DISCONTINUED | OUTPATIENT
Start: 2019-10-15 | End: 2019-10-16

## 2019-10-15 RX ADMIN — FENTANYL CITRATE 25 MCG: 50 INJECTION INTRAMUSCULAR; INTRAVENOUS at 07:33

## 2019-10-15 RX ADMIN — ONDANSETRON 4 MG: 2 INJECTION INTRAMUSCULAR; INTRAVENOUS at 07:33

## 2019-10-15 RX ADMIN — ACETAMINOPHEN 975 MG: 325 TABLET ORAL at 21:19

## 2019-10-15 RX ADMIN — OLANZAPINE 2.5 MG: 2.5 TABLET, FILM COATED ORAL at 21:42

## 2019-10-15 RX ADMIN — FENTANYL CITRATE 25 MCG: 50 INJECTION INTRAMUSCULAR; INTRAVENOUS at 14:11

## 2019-10-15 RX ADMIN — FENTANYL CITRATE 25 MCG: 50 INJECTION INTRAMUSCULAR; INTRAVENOUS at 09:23

## 2019-10-15 RX ADMIN — ACETAMINOPHEN 975 MG: 325 TABLET ORAL at 16:28

## 2019-10-15 RX ADMIN — BUTALBITAL, ACETAMINOPHEN AND CAFFEINE 1 TABLET: 50; 325; 40 TABLET ORAL at 09:22

## 2019-10-15 RX ADMIN — FENTANYL CITRATE 25 MCG: 50 INJECTION INTRAMUSCULAR; INTRAVENOUS at 03:24

## 2019-10-15 RX ADMIN — SODIUM CHLORIDE 5 MG/HR: 0.9 INJECTION, SOLUTION INTRAVENOUS at 01:49

## 2019-10-15 RX ADMIN — LABETALOL 20 MG/4 ML (5 MG/ML) INTRAVENOUS SYRINGE 10 MG: at 17:11

## 2019-10-15 RX ADMIN — ONDANSETRON 4 MG: 2 INJECTION INTRAMUSCULAR; INTRAVENOUS at 14:39

## 2019-10-15 RX ADMIN — PROMETHAZINE HYDROCHLORIDE 25 MG: 25 INJECTION INTRAMUSCULAR; INTRAVENOUS at 09:24

## 2019-10-15 RX ADMIN — FENTANYL CITRATE 25 MCG: 50 INJECTION INTRAMUSCULAR; INTRAVENOUS at 00:24

## 2019-10-15 RX ADMIN — AMLODIPINE BESYLATE 5 MG: 5 TABLET ORAL at 10:01

## 2019-10-15 RX ADMIN — HYDRALAZINE HYDROCHLORIDE 5 MG: 20 INJECTION INTRAMUSCULAR; INTRAVENOUS at 21:17

## 2019-10-15 NOTE — CONSULTS
Consultation - Neurology   Grayson Vines 45 y o  male MRN: [id number]  Unit/Bed#: ICU 03 Encounter: [id number]      Assessment/Plan   Assessment:  A 45years old male with past medical history of white coat hypertension not on treatment and depression as well as chronic headache presented with left upper extremity weakness and left facial droop found to have right intraparenchymal hemorrhage current lately in the ICU on nicardipine  Plan:  # Right Lenticular IPH  · Secondary to likely poorly uncontrolled hypertension  · Control blood pressure 140-160  · Repeat CT head  · Non emergent MRI brain checking for PRESS or previous intraparenchymal hemorrhage  · Frequent neuro q 4 hours  · Phenergan 25 mg IV p r n  Q 6 for nausea and headache  · Speech eval noted past regular and thin liquids  · No antiplatelet or anticoagulation for now    # depression with suicidal ideas  Management for primary team    History of Present Illness     Reason for Consult / Principal Problem:  Right Intraparenchymal hemorrhage  Hx and PE limited by:  Drowsiness likely secondary to fentanyl  HPI: Grayson Vines is a 45 y o   male who presents with past medical history of depression with suicidal ideation, white coat hypertension and headache on Excedrin  Presented to  [facility] hospital 20 minutes after he noticed left upper extremity weakness  His symptoms started with left upper extremity numbness and inability to feel his left upper extremity  His symptoms progressed to left upper extremity weakness and feeling unwell  He drove himself to the emergency room using his right arm where his blood pressure was noted above 200 over 100  His CT scan head showed right lenticular hemorrhage  He was started on nicardipine drip and transferred to [place] for further workup and neurology consulted for further management      Since 2012 he reported frontal headache mainly at the back of the right high and was evaluated by Neurology and outpatient family medicine physician with multiple medications without improvement in his headache  His headache was the reason he dropped of his job as a  and was debilitating for him and had suicidal thoughts without plan because of his headache  He was using Excedrin as over-the-counter  He was never diagnosed as hypertension rather white coat hypertension but he did not follow up with primary care physician for over 5 years  He lives with his nephew and currently unemployed  He denied smoking, illicit drug use or alcohol use  His family history is noted for a sister with cardiac surgery in her childhood and horseshoe kidneys as well as hypertension in his parents  He denied having photophobia, phonophobia, fever, chills, shortness of breath or cough  His started having nausea and vomiting upon admission to the hospital   For the time of my exam he is alert and oriented has slight subjective improvement in his left arm weakness  His headache was 7/10  Inpatient consult to Neurology     Performed by  Scot Estevez MD     Authorized by Casper Kenney DO              Review of Systems   Constitutional: Negative for chills and fever  HENT: Negative for rhinorrhea and sore throat  Eyes: Negative for photophobia and visual disturbance  Respiratory: Negative for cough and shortness of breath  Gastrointestinal: Positive for nausea and vomiting  Endocrine: Negative for cold intolerance and heat intolerance  Genitourinary: Negative for difficulty urinating and dysuria  Musculoskeletal: Negative for neck stiffness  Skin: Negative for color change and rash  Neurological: Positive for facial asymmetry, weakness, numbness and headaches  Psychiatric/Behavioral: Positive for dysphoric mood and suicidal ideas         Historical Information   Past Medical History:   Diagnosis Date    Depression     Hypertension     Migraine      Past Surgical History:   Procedure Laterality Date    LITHOTRIPSY       Social History   Social History     Substance and Sexual Activity   Alcohol Use Not Currently     Social History     Substance and Sexual Activity   Drug Use Never     Social History     Tobacco Use   Smoking Status Never Smoker   Smokeless Tobacco Never Used     Family History: Horseshoe kidneys in his sister and hypertension in his parents as well as cardiac surgery in his sister    Review of previous medical records was  completed  Meds/Allergies   all current active meds have been reviewed    Allergies   Allergen Reactions    Milk-Related Compounds Throat Swelling    Other      cats    Eggs Or Egg-Derived Products     Wheat Bran      Eats it sometimes         Objective   Vitals:Blood pressure 129/80, pulse 70, temperature 98 2 °F (36 8 °C), temperature source Oral, resp  rate 16, height 5' 7" (1 702 m), weight 69 5 kg (153 lb 3 5 oz), SpO2 98 %  ,Body mass index is 24 kg/m²  Intake/Output Summary (Last 24 hours) at 10/15/2019 0734  Last data filed at 10/15/2019 0725  Gross per 24 hour   Intake 1935 67 ml   Output 3276 ml   Net -1340 33 ml       Invasive Devices: Invasive Devices     Peripheral Intravenous Line            Peripheral IV 10/14/19 Distal;Left;Ventral (anterior) Forearm less than 1 day    Peripheral IV 10/14/19 Left Forearm less than 1 day                Physical Exam   Constitutional: He appears well-developed and well-nourished  No distress  HENT:   Head: Normocephalic and atraumatic  Eyes: Right eye exhibits no discharge  Left eye exhibits no discharge  No scleral icterus  Left gaze and paralysis   Neck: Neck supple  Cardiovascular: Normal rate, regular rhythm and normal heart sounds  Exam reveals no friction rub  No murmur heard  Pulmonary/Chest: Effort normal and breath sounds normal  No stridor  No respiratory distress  He has no wheezes  He has no rales  Abdominal: Soft  Bowel sounds are normal  He exhibits no distension and no mass   There is no tenderness  There is no guarding  Neurological: He is alert  Skin: Skin is dry  He is not diaphoretic  Psychiatric: His speech is normal    Flat affect     Neurologic Exam     Mental Status   Disoriented to person  Disoriented to place  Disoriented to time  Disoriented to year, month and day  Speech: speech is normal   Level of consciousness: drowsy    Cranial Nerves     CN II   Visual acuity: (Difficult to assess, decreased visual field in the left temporal region)    CN III, IV, VI   Right pupil: Size: 3 mm  Shape: regular  Reactivity: sluggish  Right consensual response: Disconjugate  Left pupil: Size: 3 mm  Shape: regular  Reactivity: sluggish  Conjugate gaze: absent    CN VII   Left facial weakness: central    CN XI   Right sternocleidomastoid strength: normal  Left sternocleidomastoid strength: weak    CN XII   CN XII normal      Motor Exam   Right arm tone: normal  Left arm tone: decreasedLeft upper extremity 2 to 3/5     Sensory Exam   Right arm light touch: normal  Left arm light touch: decreased from elbow  Right leg light touch: normal  Left leg light touch: decreased from knee      Lab Results:   I have personally reviewed pertinent reports    , CBC:   Results from last 7 days   Lab Units 10/15/19  0533 10/14/19  1500   WBC Thousand/uL  --  5 20   RBC Million/uL  --  4 90   HEMOGLOBIN g/dL 15 2 15 3   HEMATOCRIT % 42 4 43 6   MCV fL  --  89   PLATELETS Thousands/uL  --  147*   , BMP/CMP:   Results from last 7 days   Lab Units 10/15/19  0533 10/14/19  1500   SODIUM mmol/L 137 141   POTASSIUM mmol/L 3 6 3 6   CHLORIDE mmol/L 104 105   CO2 mmol/L 23 28   BUN mg/dL 9 13   CREATININE mg/dL 0 87 1 18   CALCIUM mg/dL 8 8 9 5   AST U/L  --  19   ALT U/L  --  26   ALK PHOS U/L  --  58   EGFR ml/min/1 73sq m 106 76   , Lipid Profile:   Results from last 7 days   Lab Units 10/15/19  0533   HDL mg/dL 49   LDL CALC mg/dL 71   TRIGLYCERIDES mg/dL 31   , Drug Screen:   Results from last 7 days   Lab Units 10/14/19  1752   BARBITURATE UR  Negative   BENZODIAZEPINE UR  Negative   THC UR  Negative   COCAINE UR  Negative   METHADONE URINE  Negative   OPIATE UR  Negative   PCP UR  Negative     Imaging Studies: I have personally reviewed pertinent reports  EKG, Pathology, and Other Studies: I have personally reviewed pertinent reports  VTE Prophylaxis: Reason for no pharmacologic prophylaxis Intracranial hemorrhage    Code Status: Level 1 - Full Code  Advance Directive and Living Will:      Power of :    POLST:      Counseling / Coordination of Care  Total time spent today 40 minutes  Greater than 50% of total time was spent with the patient and / or family counseling and / or coordination of care  A description of the counseling / coordination of care: Jennifer Renee

## 2019-10-15 NOTE — PLAN OF CARE
Problem: PHYSICAL THERAPY ADULT  Goal: Performs mobility at highest level of function for planned discharge setting  See evaluation for individualized goals  Description  Treatment/Interventions: Functional transfer training, LE strengthening/ROM, Therapeutic exercise, Endurance training, Cognitive reorientation, Gait training, Bed mobility, Spoke to nursing, OT          See flowsheet documentation for full assessment, interventions and recommendations  Note:   Prognosis: Fair  Problem List: Decreased endurance, Impaired balance, Decreased mobility, Decreased coordination, Decreased cognition, Impaired judgement, Decreased safety awareness, Impaired vision  Assessment: Pt is a 45year old male presenting initially to [facility] ED on 10/14/19 with sudden onset of L facial droop, L arm weakness  Pt transferred to [facility] same day for increased level of care  CT revealed acute intraparenchymal hemorrhage centered at the right lentiform nucleus with mild surrounding vasogenic edema  CTA Stroke alert unremarkable  Pt has history of HTN, depression, chronic migraines, and suicidal ideation  Pt presents today for initial physical therapy evaluation supine  Pt is lethargic, requiring multiple cues for arousal, but is agreeable to therapy session  Pt demonstrates slow processing throughout therapy session and requires repetition and increased time to follow one step commands  Pt transferred supine to sit with Sindy x1  Pt sat EOB ~5 min with Sindy x1  While seated EOB pt able to identify R lateral trunk lean, with Sindy x1 to correct  Pt transferred sit to stand with max Ax2  While in standing pt demonstrates poor midline awareness, requiring cues for upright posture and correction of lateral trunk lean  Pt ambulated 8 lateral steps to bedside chair with max Ax2  Pt with difficulty coordinating steps, requiring visual/verbal cues for proper foot placement   Pt with narrow RADHA, slowed stepping, and shuffling steps to chair  Pt remained seated in bedside chair at end of session with all needs within reach and RN Winnie notified  PT to recommend inpt rehab to maximize pt safety and functional mobility  PMR consult for acute rehab placement  PT to follow        Recommendation: (S) Post acute IP rehab, Rehab consult     PT - OK to Discharge: (S) Yes    See flowsheet documentation for full assessment        Nannette Barber, SPT

## 2019-10-15 NOTE — SPEECH THERAPY NOTE
Speech Language/Pathology  Speech/Language Pathology  Assessment    Patient Name: Mile Rojas  Today's Date: 10/15/2019     Problem List  Principal Problem:    Intraparenchymal hemorrhage of brain [facility])  Active Problems:    Left-sided weakness    Suicidal ideation    Depression    Past Medical History  Past Medical History:   Diagnosis Date    Depression     Hypertension     Migraine      Past Surgical History  Past Surgical History:   Procedure Laterality Date    LITHOTRIPSY       45 y o  male with a past medical history of migraine headaches and depression who presents as a transfer from the [address] [place] [address] Emergency Department  Patient originally presented for evaluation of acute onset left-sided facial weakness and left arm weakness of 20 minutes' duration  Stated he "Didn't feel right    Something was wrong" while lifting weights this morning  Upon initial evaluation, patient stated that symptoms were unchanged from onset  Denied ever experiencing similar symptoms  Denied loss of consciousness, fevers, headaches, chest pain, shortness of breath, nausea, vomiting, and abdominal pain  Denied current alcohol use; denied ever using tobacco products or recreational drugs  Endorses family history of hypertension in sister  Initial vital signs: HR 94, RR 16, /129, 98% O2 saturation on room air  Initial laboratory was unremarkable; PT, PTT, and INR within normal limits; negative troponin  Initial chest x-ray revealed no acute cardiopulmonary disease  CT headrevealed acute intraparenchymal hemorrhage centered at the right lentiform nucleus with mild surrounding vasogenic edema; no significant midline shift  CTA head/neck unremarkable  NIHSS score of 2; ICH score of 0    Patient was subsequently transferred to [facility] for evaluation and management of intraparenchymal brain hemorrhage      Currently endorses right eye pain with associated tearing (not the same as his usual headaches) and drowsiness  Denies headaches, fevers, chills, vision changes, lightheadedness, dizziness, chest pain, and shortness of breath  CXR-No acute cardiopulmonary disease  Bedside Swallow Evaluation:    Summary:  Pt presents w/ mild oral dysphagia w/ reduced lingual propulsion for clearing, noted L sided pocketing & lingual surface residue  Pt is very lethargic, keeps eyes closed & needs mod cues to remain alert, awake  Recommendations:  Diet: regular    Liquid: thin  Meds: as tolerated  Supervision:  Positioning:Upright  Strategies: Pt to take PO/Meds only when fully alert and upright  Check L side for any pocketing, cue pt to clear  Oral care: frequently  Aspiration precautions    Therapy Prognosis: unknonw  Prognosis considerations:  Frequency: will f/u at least 1-2x    Patient's goal: none stated     Consider consult w/:  Rehab  Nutrition    Reason for consult:  R/o aspiration  Determine safest and least restrictive diet  Failed nursing dysphagia assessment  Change in mental status  New neuro event    Current diet:  NPO  Premorbid diet[de-identified]  Regular w/ thin   Previous VBS:  -  O2 requirement:  RA  Voice/Speech:  Intelligible, able to make needs known  Social:  States he lives alone, out of work right now  Follows commands:                        Yes with eyes closed  Cognitive Status:  Pt answers ?s, not opening eyes & at times needs cues to remain alert  Oral mech exam:  Dentition: natural   Labial strength and ROM: reduced on the L  Lingual strength and ROM: wfl  Mandibular strength and ROM: wfl  Velum: wfl   Secretion management:wfl  Volitional cough: wfl  Volitional swallow: present    Items administered:  Puree, soft solid, hard solid, honey thick liquid, nectar thick liquid, thin liquids  Liquids were taken by straw/cup       Oral stage:  Lip closure: fair w/ noted extensive opening on the R &* head turns to the R  Mastication: min reduced, slow  Bolus formation: wfl, though mildly slow  Bolus control: wfl  Transfer: min reduced  Oral residue: noted on L side tongue  Pocketing: L buccal pocketing    Pharyngeal stage:  Swallow promptness:fairly prompt  Laryngeal rise: min reduced upon palpation  Wet voice: none  Throat clear: min w/ thin x2 initially only with straw, ok w/ small cup sips  Cough: none  Secondary swallows: periodically    Esophageal stage:  No s/s reported    Aspiration precautions posted    Results d/w:  Pt, nursing, family, physician, dieticia    Goal(s):  Pt will tolerate least restrictive diet w/out s/s aspiration or oral/pharyngeal difficulties

## 2019-10-15 NOTE — PLAN OF CARE
Problem: OCCUPATIONAL THERAPY ADULT  Goal: Performs self-care activities at highest level of function for planned discharge setting  See evaluation for individualized goals  Description  Treatment Interventions: ADL retraining, Functional transfer training, UE strengthening/ROM, Endurance training, Cognitive reorientation, Patient/family training, Equipment evaluation/education, Activityengagement, Energy conservation, Continued evaluation, Compensatory technique education, Fine motor coordination activities, Neuromuscular reeducation          See flowsheet documentation for full assessment, interventions and recommendations  Note:   Limitation: Decreased ADL status, Decreased UE ROM, Decreased UE strength, Decreased Safe judgement during ADL, Decreased cognition, Decreased endurance, Decreased fine motor control, Decreased self-care trans, Decreased high-level ADLs, Non-func L UE  Prognosis: Fair  Assessment: Pt is a 45 y o  Male who presented to [facility] with acute onset of L facial weakness and L arm weakness  Pt received CT scan which revealed acute intraparenchymal hemorrhage  Pt transferred to [facility] for evaluation and management of intraparenchymal hemorrhage  Pt has PMH of depression and chronic migraines  Pt lives in a 1  with a basement with his nephew and his nephew's girlfriend  Pt reports 2STE and 12 steps to basement  At baseline, pt is I with ADLs, IADLs, and functional mobility with no DME  Currently, pt requires Min A x 1 for bed mobility, Max Ax 2 for functional transfers and functional mobility, and Mod A x 1 for all UB/LB ADLs  Pt presents with impairments in the following categories: decreased LUE strength, decreased LUE AROM, decreased sitting/standing balance, decreased endurance, decreased cognition, LE weakness, decreased safety awareness, decreased activity tolerance, decreased standing tolerance, lethargy, limited family support   All these performance deficits limit his ability to perform UB/LB ADLs, functional mobility, functional transfers, bed mobility, leisure pursuits, community mobility, driving, toileting, grooming, feeding  Based on the above mentioned deficits and the ongoing limitations, would consider pt to be high complexity evaluation  Pt scored 40/100 on Barthel Index  Upon D/C, OT recommends STR  Pt will continue to be seen for skilled OT services 3-5x/wk during LOS to achieve listed goals       OT Discharge Recommendation: Short Term Rehab  OT - OK to Discharge: Yes(once medically stable)    SEPIDEH Gaines

## 2019-10-15 NOTE — PHYSICAL THERAPY NOTE
Physical Therapy Evaluation      10/15/19 1320   Note Type   Note type Eval only   Pain Assessment   Pain Assessment No/denies pain   Home Living   Type of [address] One level; Able to live on main level with bedroom/bathroom; Performs ADLs on one level  (2 VIET, 12 steps to basement)   Home Equipment   (no DME)   Prior Function   Level of [place] Independent with ADLs and functional mobility   Lives With Family  (Nephew and nephew's girlfriend)   Receives Help From Family   ADL Assistance Independent   IADLs Independent   Falls in the last 6 months 0   Vocational Unemployed   Comments Per pt, his nephew works and is not home during the day   Restrictions/Precautions   Wells [place] Bearing Precautions Per Order No   Other Precautions 1:1;Cognitive;Multiple lines;Telemetry; Fall Risk;Visual impairment   General   Family/Caregiver Present No   Cognition   Overall Cognitive Status Impaired   Arousal/Participation Lethargic   Attention Attends with cues to redirect   Orientation Level Oriented to person;Oriented to place;Oriented to time;Oriented to situation   Following Commands Follows one step commands with increased time or repetition   Comments Pt is lethargic, requiring multiple cues for arousal, but is agreeable to therapy session  Pt demonstrates slow processing throughout therapy session and requires repetition and increased time to follow one step commands  RLE Assessment   RLE Assessment WFL   LLE Assessment   LLE Assessment WFL   Coordination   Movements are Fluid and Coordinated 0   Coordination and Movement Description Pt with difficulty coordinating steps, requiring visual/verbal cues for proper foot placement  Pt with narrow RADHA, slowed stepping, and shuffling steps to chair  Sensation WFL   Bed Mobility   Supine to Sit 4  Minimal assistance   Additional items Assist x 1; Increased time required;Verbal cues   Transfers   Sit to Stand 2  Maximal assistance   Additional items Assist x 2;Increased time required;Verbal cues   Stand to Sit 2  Maximal assistance   Additional items Assist x 2; Increased time required;Verbal cues   Ambulation/Elevation   Gait pattern Improper Weight shift;Narrow RADHA; Foward flexed; Excessively slow; Shuffling;Decreased foot clearance; Ataxia   Gait Assistance 2  Maximal assist   Additional items Assist x 2;Verbal cues  (visual cues for foot placement)   Assistive Device   (hand held assist)   Distance 8 lateral steps to bedside chair   Balance   Static Sitting Poor +   Dynamic Sitting Poor +   Static Standing Poor -   Dynamic Standing Poor -   Ambulatory Poor -   Endurance Deficit   Endurance Deficit Yes   Endurance Deficit Description cognition, difficulty coordinating steps, fatigue   Activity Tolerance   Activity Tolerance Patient tolerated treatment well;Patient limited by fatigue   Medical Staff Made Aware OT   Nurse Made Aware Yes Winnie   Assessment   Prognosis Fair   Problem List Decreased endurance; Impaired balance;Decreased mobility; Decreased coordination;Decreased cognition; Impaired judgement;Decreased safety awareness; Impaired vision   Assessment Pt is a 45year old male presenting initially to [facility] ED on 10/14/19 with sudden onset of L facial droop, L arm weakness  Pt transferred to [facility] same day for increased level of care  CT revealed acute intraparenchymal hemorrhage centered at the right lentiform nucleus with mild surrounding vasogenic edema  CTA Stroke alert unremarkable  Pt has history of HTN, depression, chronic migraines, and suicidal ideation  Pt presents today for initial physical therapy evaluation supine  Pt is lethargic, requiring multiple cues for arousal, but is agreeable to therapy session  Pt demonstrates slow processing throughout therapy session and requires repetition and increased time to follow one step commands  Pt transferred supine to sit with Sindy x1  Pt sat EOB ~5 min with Sindy x1   While seated EOB pt able to identify R lateral trunk lean, with Sindy x1 to correct  Pt transferred sit to stand with max Ax2  While in standing pt demonstrates poor midline awareness, requiring cues for upright posture and correction of lateral trunk lean  Pt ambulated 8 lateral steps to bedside chair with max Ax2  Pt with difficulty coordinating steps, requiring visual/verbal cues for proper foot placement  Pt with narrow RADHA, slowed stepping, and shuffling steps to chair  Pt remained seated in bedside chair at end of session with all needs within reach and YOLANDA Zhou notified  PT to recommend inpt rehab to maximize pt safety and functional mobility  PMR consult for acute rehab placement  PT to follow   Goals   Patient Goals To work out at home   STG Expiration Date 10/29/19   Short Term Goal #1 In 10 sessions pt will: 1  Transfer supine to sit with supervision 2  Sit EOB ~10 min with supervision  3  Perform LE exercise program 4  Transfer sit to stand with LRAD and mod A x1 5  Ambulate > 50 ft with LRAD and modA x1    PT Treatment Day 0   Plan   Treatment/Interventions Functional transfer training;LE strengthening/ROM; Therapeutic exercise; Endurance training;Cognitive reorientation;Gait training;Bed mobility;Spoke to nursing;OT   PT Frequency   (3-6x/wk)   Recommendation   Recommendation Post acute IP rehab;Rehab consult   PT - OK to Discharge Yes   Modified Mikie Scale   Modified [place] Scale 4   Barthel Index   Feeding 5   Bathing 0   Grooming Score 0   Dressing Score 5   Bladder Score 10   Bowels Score 10   Toilet Use Score 5   Transfers (Bed/Chair) Score 5   Mobility (Level Surface) Score 0   Stairs Score 0   Barthel Index Score 40     Oksana Gutierrez, DANY

## 2019-10-15 NOTE — OCCUPATIONAL THERAPY NOTE
[address]  Evaluation     Patient Name: Karen Fragoso  [id number]'[id number] Date: 10/15/2019  Problem List  Principal Problem:    Intraparenchymal hemorrhage of brain [facility])  Active Problems:    Left-sided weakness    Suicidal ideation    Depression    Past Medical History  Past Medical History:   Diagnosis Date    Depression     Hypertension     Migraine      Past Surgical History  Past Surgical History:   Procedure Laterality Date    LITHOTRIPSY             10/15/19 1322   Note Type   Note type Eval/Treat   Restrictions/Precautions   Weight Bearing Precautions Per Order No   Other Precautions Chair Alarm;Cognitive; Fall Risk;Telemetry   Pain Assessment   Pain Assessment No/denies pain   Pain Score No Pain   Home Living   Type of [address] Able to live on main level with bedroom/bathroom; One level  (2STE, 12 steps to basement, Exercise equipment in basement, )   Bathroom Shower/Tub Walk-in shower   Bathroom Toilet Standard   Bathroom Equipment Grab bars in [facility]   (denies any)   Additional Comments Pt lives in [facility] with 2STE and a basement  12 steps to basement  Pt has exercise equipment in basement which he uses multiple times/wk   Prior Function   Level of [place] Independent with ADLs and functional mobility   Lives With Family  (nephew and nephew's girlfriend who work full-time)   ADL Assistance Independent   IADLs Independent   Falls in the last 6 months 0   Vocational Unemployed   Comments I with ADLs/IADLs PTA  Reports living with nephew and nephew's girlfriend who both work full-time and are not home during the day  Pt reports he is unemployed and has had 0 falls within past 6 mo  Lifestyle   Autonomy I with ADLs, IADLs, functional mobility, and driving PTA   Reciprocal Relationships lives with nephew and nephew's girlfriend  Pt's mother came to visit during evaluation     Service to Others unemployed   [address] enjoys walking and exercising Psychosocial   Psychosocial (WDL) WDL   Length of Time/Family Visitation 0-5 min  (Pt's mother present for last couple minutes of evaluation)   ADL   Eating Assistance 4  Minimal Assistance   Grooming Assistance 3  Moderate Assistance   UB Bathing Assistance 3  Moderate Assistance   LB Bathing Assistance 3  Moderate Assistance   UB Dressing Assistance 3  Moderate Assistance   LB Dressing Assistance 3  Moderate Assistance   LB Dressing Deficit Don/doff R sock; Don/doff L sock; Verbal cueing; Increased time to complete   Toileting Assistance  3  Moderate Assistance   Functional Assistance 2  Maximal Assistance  (Ax2)   Bed Mobility   Supine to Sit 4  Minimal assistance   Additional items Assist x 1; Increased time required;HOB elevated;Verbal cues   Additional Comments Pt received supine in bed with HOB elevated  Required cues for safety to sit EOB  Pt demonstrated difficulty scooting L side forward to get to EOB 2' to L-sided weakness  Pt sat EOB demonstrating forward and lateral leaning  Pt held onto R bedrail with RUE for support until verbal cue given to let go and sit upright  Transfers   Sit to Stand 2  Maximal assistance   Additional items Assist x 2; Increased time required;Verbal cues   Stand to Sit 2  Maximal assistance   Additional items Assist x 2; Increased time required;Armrests; Verbal cues   Additional Comments Required Max A x 2 and HH assist due to L-sided weakness and needed verbal cueing for safety and proper technique  Needed increased time and verbal cueing to stand upright and gain balance  Functional Mobility   Functional Mobility 2  Maximal assistance   Additional Comments Required Max A x 2 due to L-sided weakness  Required verbal cueing for safety, LE management during functional mobility, and steadying balance   Pt demonstrated lateral leaning to L-side 2' L-sided weakness   Additional items Hand hold assistance   Balance   Static Sitting Poor +   Dynamic Sitting Poor +   Static Standing Poor -   Dynamic Standing Poor -   Ambulatory Poor -   Activity Tolerance   Activity Tolerance Patient limited by fatigue   Medical Staff Made Aware PT Veena and PTS Ana Schirmer   Nurse Made Aware yes, RN Winnie CHAVEZ Assessment   RUE Assessment WFL   LUE Assessment   LUE Assessment X  (PROM WFL, 2/5 distally, 0/5 proximally, fair  strength)   Hand Function   Gross Motor Coordination Impaired  (RUE WFL, LUE impaired)   Fine Motor Coordination Impaired  (RUE WFL, LUE impaired)   Sensation   Light Touch No apparent deficits   Vision-Basic Assessment   Current Vision No visual deficits   Cognition   Overall Cognitive Status Impaired   Arousal/Participation Responsive; Cooperative;Lethargic   Attention Attends with cues to redirect   Orientation Level Oriented X4   Memory Within functional limits   Following Commands Follows one step commands with increased time or repetition   Comments Pt able to recall biographical info and home set up demonstrating memory [facility]  Pt is lethargic and require directions to be repeated  Pt needs increased time to process info  Assessment   Limitation Decreased ADL status; Decreased UE ROM; Decreased UE strength;Decreased Safe judgement during ADL;Decreased cognition;Decreased endurance;Decreased fine motor control;Decreased self-care trans;Decreased high-level ADLs; Non-func L UE   Prognosis Fair   Assessment Pt is a 45 y o  Male who presented to [facility] with acute onset of L facial weakness and L arm weakness  Pt received CT scan which revealed acute intraparenchymal hemorrhage  Pt transferred to [facility] for evaluation and management of intraparenchymal hemorrhage  Pt has PMH of depression and chronic migraines  Pt lives in a 1  with a basement with his nephew and his nephew's girlfriend  Pt reports 2STE and 12 steps to basement  At baseline, pt is I with ADLs, IADLs, and functional mobility with no DME   Currently, pt requires Min A x 1 for bed mobility, Max Ax 2 for functional transfers and functional mobility, and Mod A x 1 for all UB/LB ADLs  Pt presents with impairments in the following categories: decreased LUE strength, decreased LUE AROM, decreased sitting/standing balance, decreased endurance, decreased cognition, LE weakness, decreased safety awareness, decreased activity tolerance, decreased standing tolerance, lethargy, limited family support  All these performance deficits limit his ability to perform UB/LB ADLs, functional mobility, functional transfers, bed mobility, leisure pursuits, community mobility, driving, toileting, grooming, feeding  Based on the above mentioned deficits and the ongoing limitations, would consider pt to be high complexity evaluation  Pt scored 40/100 on Barthel Index  Upon D/C, OT recommends STR  Pt will continue to be seen for skilled OT services 3-5x/wk during LOS to achieve listed goals  Goals   Patient Goals to return back to prior level of functioning and lift weights again   LTG Time Frame 7-10   Long Term Goal #1 see below listed goals   Plan   Treatment Interventions ADL retraining;Functional transfer training;UE strengthening/ROM; Endurance training;Cognitive reorientation;Patient/family training;Equipment evaluation/education; Activityengagement; Energy conservation;Continued evaluation; Compensatory technique education; Fine motor coordination activities; Neuromuscular reeducation   Goal Expiration Date 10/25/19   OT Frequency 3-5x/wk   Recommendation   OT Discharge Recommendation Short Term Rehab   OT - OK to Discharge Yes  (once medically stable)   Barthel Index   Feeding 5   Bathing 0   Grooming Score 0   Dressing Score 5   Bladder Score 10   Bowels Score 10   Toilet Use Score 5   Transfers (Bed/Chair) Score 5   Mobility (Level Surface) Score 0   Stairs Score 0   Barthel Index Score 40   Modified [place] Scale   Modified Mikie Scale 4     GOALS  Pt will perform bed mobility with supervision and use of AE/DME as needed to increase functional independence  Pt will perform functional transfers with Mod A x 1 and use of AE/DME as needed to increase functional independence  Pt will perform functional mobility with Mod A x 1 and use of AE/DME as needed to increase independence for engagement in functional activity  Pt will complete all UB ADLs with supervision and set up and use of AE/DME as needed to increase functional independence during ADLs  Pt will complete all LB ADLs with Min A x 1 and use of AE/DME as needed to increase functional independence during ADLs  Pt will be AOx4 100% of the time to improve engagement during therapeutic activity  Pt will follow one-step commands 100% of the time with no more than 1 verbal cue for repeated directions to increase attention to a task  Pt will sit EOB with supervision with no more than 1 verbal cue for postural correction to improve sitting balance for engagement in ADLs at EOB  Pt will stand for 5 min to complete ADL task with use of AE/DME as needed to increase standing tolerance for sink-level ADLs  Pt will complete toileting with supervision and use of AE/DME as needed to increase functional independence during ADLs  Pt will increase LUE strength and AROM by 1 MMG for increased independence during functional activities       Shivani Coffey, OTS

## 2019-10-15 NOTE — UTILIZATION REVIEW
Initial Clinical Review    Admission: Date/Time/Statement: Inpatient Admission Orders (From admission, onward)     Ordered        10/15/19 0155  Inpatient Admission  Once                   Orders Placed This Encounter   Procedures    Inpatient Admission     Standing Status:   Standing     Number of Occurrences:   1     Order Specific Question:   Admitting Physician     Answer:   Marielle Young     Order Specific Question:   Level of Care     Answer:   Critical Care [15]     Order Specific Question:   Estimated length of stay     Answer:   More than 2 Midnights     Order Specific Question:   Certification     Answer:   I certify that inpatient services are medically necessary for this patient for a duration of greater than two midnights  See H&P and MD Progress Notes for additional information about the patient's course of treatment  Assessment/Plan: 44 yo m with a PMH of migraine headaches and depression  [facility] acute onset of left side facial weakness and LUE weakness  Of 20 minutes duration  He reports feeling something was wrong while lifting weights this morning  CT headrevealed acute intraparenchymal hemorrhage centered at the right lentiform nucleus with mild surrounding vasogenic edema; no significant midline shift  CTA head/neck unremarkable  NIHSS score of 2; ICH score of 0  Patient was subsequently transferred to and admitted inpatient to  [facility] for evaluation and management of intraparenchymal brain hemorrhage  10/15 - On exam he has persistent left facial droop and hemiparesis with interval symptomatic improvement- will needs formal speech eval prior to PO intake, following failure of bedside dysphagia assessment  remains on IVF @ 100 ml/hr - cardene gtt  For BP control - He had an episode of nausea and emesis  Due to persistent  Right- side retro- orbital pain       Exam: Neurological: He is alert and oriented to person, place, and time  He displays abnormal reflex  A cranial nerve deficit and sensory deficit is present  He exhibits abnormal muscle tone  Bilateral left visual field loss; complete loss of sensation over left forehead, cheek, and chin; persistent left facial droop with interval improvement in ability to raise left eye brow, wrinkle left forehead, and squeeze left eye shut (increased resistance to opening); left-sided tongue deviation (unable to deviate tongue to right side); weak shoulder shrug on left side; flaccid paralysis of left upper extremity with interval recovery of sensation over left upper extremity (biceps and triceps compartment); 4/5 left lower extremity strength, interval recovery of sensation over left lower extremity (left foot), and left-sided patellar hyperreflexia; able to wiggle left toes slightly on command    Occasionally places hand over right eye and grimaces secondary to ongoing retro-orbital pain      Vitals   Temperature Pulse Respirations Blood Pressure SpO2   10/14/19 1651 10/14/19 1651 10/14/19 1656 10/14/19 1651 10/14/19 1651   98 °F (36 7 °C) 84 17 151/76 97 %      Temp Source Heart Rate Source Patient Position - Orthostatic VS BP Location FiO2 (%)   10/14/19 1651 10/14/19 2000 10/14/19 2000 10/14/19 2000 --   Oral Monitor Lying Left arm       Pain Score       10/14/19 1651       Worst Possible Pain        Wt Readings from Last 1 Encounters:   10/14/19 69 5 kg (153 lb 3 5 oz)     Additional Vital Signs:  Date/Time  Temp  Pulse  Resp  BP  MAP (mmHg)  SpO2  O2 Device  Patient Position - Orthostatic VS   10/15/19 1001        135/73           10/15/19 1000    74  10Abnormal   122/68  89  98 %       10/15/19 0953    78  15  122/68  89  99 %    Lying   10/15/19 0923    54Abnormal   11Abnormal       99 %       10/15/19 0803    68  13      97 %       10/15/19 0800    64  13  135/82  106  97 %  None (Room air)  Lying   10/15/19 0733    72  15  139/79  104  98 %      10/15/19 0715  98 2 °F (36 8 °C)  70  16      98 %       10/15/19 0700    54Abnormal   10Abnormal   129/80  96  98 %  None (Room air)     10/15/19 0600    58  10Abnormal   133/78  96  98 %    Lying   10/15/19 0500    62  10Abnormal   131/72  94  99 %    Lying   10/15/19 0400  98 1 °F (36 7 °C)  62  10Abnormal   124/71  88  97 %  None (Room air)  Lying   10/15/19 0300    84  21  116/69  89  98 %       10/15/19 0200    74  13  127/72  86  98 %       10/15/19 0100    70  10Abnormal   117/66  84  98 %    Lying   10/15/19 0000  98 2 °F (36 8 °C)  76  13  131/71  93  98 %    Lying   10/14/19 2318    72  12  127/72  91  98 %  Nasal cannula  Lying   10/14/19 2100    76  13  138/71  91  98 %    Lying   10/14/19 2000    72  13  136/71  93  99 %    Lying     Pertinent Labs/Diagnostic Test Results:   Results from last 7 days   Lab Units 10/15/19  0533 10/14/19  1500   WBC Thousand/uL  --  5 20   HEMOGLOBIN g/dL 15 2 15 3   HEMATOCRIT % 42 4 43 6   PLATELETS Thousands/uL  --  147*     Results from last 7 days   Lab Units 10/15/19  0533 10/14/19  1500   SODIUM mmol/L 137 141   POTASSIUM mmol/L 3 6 3 6   CHLORIDE mmol/L 104 105   CO2 mmol/L 23 28   ANION GAP mmol/L 10 8   BUN mg/dL 9 13   CREATININE mg/dL 0 87 1 18   EGFR ml/min/1 73sq m 106 76   CALCIUM mg/dL 8 8 9 5   MAGNESIUM mg/dL 1 9  --    PHOSPHORUS mg/dL 3 1  --      Results from last 7 days   Lab Units 10/14/19  1500   AST U/L 19   ALT U/L 26   ALK PHOS U/L 58   TOTAL PROTEIN g/dL 6 9   ALBUMIN g/dL 4 7   TOTAL BILIRUBIN mg/dL 0 60     Results from last 7 days   Lab Units 10/14/19  1445   POC GLUCOSE mg/dl 89     Results from last 7 days   Lab Units 10/15/19  0533 10/14/19  1500   GLUCOSE RANDOM mg/dL 107 101*     Results from last 7 days   Lab Units 10/15/19  0533   HEMOGLOBIN A1C % 4 2   EAG mg/dl 74     Results from last 7 days   Lab Units 10/14/19  1500   TROPONIN I ng/mL <0 03     Results from last 7 days   Lab Units 10/14/19  1500 PROTIME seconds 12 0   INR  1 03   PTT seconds 37     Results from last 7 days   Lab Units 10/14/19  1752   AMPH/METH  Negative   BARBITURATE UR  Negative   BENZODIAZEPINE UR  Negative   COCAINE UR  Negative   METHADONE URINE  Negative   OPIATE UR  Negative   PCP UR  Negative   THC UR  Negative       CXR 10/14 - no acute  CTA Head & Neck - 10/14 - 1   Unremarkable CT angiogram of the brain   No CTA spot sign  2   Unremarkable CT angiogram of the neck  CT Brain - 10/14 - Acute intraparenchymal hemorrhage centered at the right lentiform nucleus with mild surrounding vasogenic edema   No significant mass effect or midline shift  EKG- 10/14 - NSR - with nonspecific ST & T wave changes    Past Medical History:   Diagnosis Date    Depression     Hypertension     Migraine      Present on Admission:   Intraparenchymal hemorrhage of brain (HCC)   Left-sided weakness   Depression      Admitting Diagnosis: Stroke (cerebrum) ([facility] [address] ) [I63 9]  Age/Sex: 45 y o  male  Admission Orders:    Medications:  acetaminophen 650 mg Oral Once 10/14   amLODIPine 5 mg Oral Daily        niCARdipine 1-15 mg/hr Intravenous Titrated     Lactated Ringers 100 ml/hr Intravenous continuous  d/c'd 10/15 @ 09:35       acetaminophen 650 mg Oral Q6H PRN    butalbital-acetaminophen-caffeine 1 tablet Oral Q4H PRN 10/15 x 1    fentanyl citrate (PF) 25 mcg Intravenous Q1H PRN 10/14 x 1  10/15 x 4   ondansetron 4 mg Intravenous Q4H PRN 10/14 x 1  10/15 x 1    promethazine 25 mg Intravenous Q6H PRN  10/15 x 1      Nursing Orders - VS q 2 Neuro checks q 2 - 1:1 behavioral health Watch - SCD's to le's- I & O q shift - PT/OT  Speech eval  - NPO     Network Utilization Review Department  Phone: [phone]; Fax [phone]  [email or web address]  ATTENTION: Please call with any questions or concerns to [phone]  and carefully listen to the prompts so that you are directed to the right person     Send all requests for admission clinical reviews, approved or denied determinations and any other requests to fax [phone]   All voicemails are confidential

## 2019-10-15 NOTE — CONSULTS
Consultation - [address] 45 y o  male MRN: [id number]  Unit/Bed#: ICU 03 Encounter: [id number]      Chief Complaint:  I have been depressed secondary to my economical situation    History of Present Illness   Physician Requesting Consult: Ruba Hilario MD  Reason for Consult / Principal Problem:  Suicidal ideation without a plan, Diagnosis ideation    Dakotah Hernandez is a 45 y o  male with longstanding history of migraines and depression presents as a transfer from [facility] emergency department he presented with acute onset of left-sided facial weakness and left arm weakness of 20 minutes duration  He states that he has a history of migraines 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 due to lack of medical insurance because he lost his job secondary to his migraines and he did not have the money to pay for the services  He has not taking any medications since 2016  He states that he saw a primary physician who prescribed to him Zoloft 1st did not work and after that [address] Rankin [address] that he took for some time but he noticed that he headache was getting worse that despite that his depression was better  He states that he have lot of financial issues at this moment is living with family members were helping him  He states that suicide or had been an alternative if he is not able to take care of himself  At this moment he denies active suicidal thoughts or plan  He denies any history of psychotic symptoms and he denies any history of manic episodes  Psychiatric Review Of Systems:  sleep: no  appetite changes: no  weight changes: no  energy/anergy: no  interest/pleasure/anhedonia: yes  somatic symptoms: no  anxiety/panic: no  naveed: no  guilty/hopeless: no  self injurious behavior/risky behavior: no    Historical Information   Past Psychiatric History:   None  Currently in treatment with none    Past Suicide attempts:  None  Past Violent behavior: None  Past Psychiatric medication trial:  Zoloft and Celexa    Substance Abuse History:  He denies any drugs or alcohol history     I have assessed this patient for substance use within the past 12 months     History of IP/OP rehabilitation program:  None  Smoking history:  Never smoked  Family Psychiatric History:   Brother have alcohol history  Father and sister have anxiety    Social History  Education: high school diploma/GED  Learning Disabilities: None  Marital history: single  Living arrangement, social support: He live with his nephew and nephew's girlfriend  Occupational History: unemployed  Functioning Relationships: good support system    Other Pertinent History: Financial and No legal or  history    Traumatic History:   Abuse: Denies any  Other Traumatic Events: None    Past Medical History:   Diagnosis Date    Depression     Hypertension     Migraine        Medical Review Of Systems:  Review of Systems - Negative except headaches, pain over both eyes, depression, all other systems reviewed were negative    Meds/Allergies   current meds:   Current Facility-Administered Medications   Medication Dose Route Frequency    acetaminophen (TYLENOL) tablet 975 mg  975 mg Oral TID    amLODIPine (NORVASC) tablet 5 mg  5 mg Oral Daily    butalbital-acetaminophen-caffeine (FIORICET,ESGIC) -40 mg per tablet 1 tablet  1 tablet Oral Q4H PRN    fentanyl citrate (PF) 100 MCG/2ML 25 mcg  25 mcg Intravenous Q1H PRN    niCARdipine (CARDENE) 25 mg (STANDARD CONCENTRATION) in sodium chloride 0 9% 250 mL  1-15 mg/hr Intravenous Titrated    OLANZapine (ZyPREXA) tablet 2 5 mg  2 5 mg Oral HS    ondansetron (ZOFRAN) injection 4 mg  4 mg Intravenous Q4H PRN    promethazine (PHENERGAN) injection 25 mg  25 mg Intravenous Q6H PRN     Allergies   Allergen Reactions    Milk-Related Compounds Throat Swelling    Other      cats    Eggs Or Egg-Derived Products     Wheat Bran      Eats it sometimes Objective   Vital signs in last 24 hours:  Temp:  [98 °F (36 7 °C)-98 7 °F (37 1 °C)] 98 7 °F (37 1 °C)  HR:  [] 70  Resp:  [10-22] 13  BP: (112-157)/(58-92) 136/76      Intake/Output Summary (Last 24 hours) at 10/15/2019 1603  Last data filed at 10/15/2019 1402  Gross per 24 hour   Intake 2336 09 ml   Output 3776 ml   Net -1439 91 ml       Mental Status Evaluation:  Appearance:  age appropriate   Behavior:  Cooperative   Speech:  normal pitch and normal volume   Mood:  depressed   Affect:  mood-congruent   Language: naming objects and repeating phrases   Thought Process:  goal directed   Associations: intact associations   Thought Content:  normal   Perceptual Disturbances: None   Risk Potential: He denies any active suicidal thoughts plan or intent   Sensorium:  person, place, time/date, situation and day of week   Memory:  recent and remote memory grossly intact   Cognition:  grossly intact   Consciousness:  alert and awake    Attention: attention span and concentration were age appropriate   Intellect: within normal limits   Fund of Knowledge: awareness of current events: Fair, past history: Fair and vocabulary: Fair   Insight:  fair   Judgment: fair   Muscle Strength and Tone: Within normal limits   Gait/Station: Unable to assess patient is in a chair   Motor Activity: no abnormal movements     Lab Results:    I have personally reviewed all pertinent laboratory/tests results    Labs in last 72 hours:   Recent Labs     10/14/19  1500 10/15/19  0533   WBC 5 20  --    RBC 4 90  --    HGB 15 3 15 2   HCT 43 6 42 4   *  --    RDW 13 7  --    SODIUM 141 137   K 3 6 3 6    104   CO2 28 23   BUN 13 9   CREATININE 1 18 0 87   GLUC 101* 107   CALCIUM 9 5 8 8   AST 19  --    ALT 26  --    ALKPHOS 58  --    TP 6 9  --    ALB 4 7  --    TBILI 0 60  --    CHOLESTEROL  --  126   HDL  --  49   TRIG  --  31   LDLCALC  --  71       Code Status: )Level 1 - Full Code    Assessment/Plan Assessment:  Melida Real is a 45 y o  male with a history of depression, migraines and hypertension who presented to the hospital for evaluation of acute onset of left-sided facial weakness and left arm weakness  He has intraparenchymal her brain hemorrhage  Patient expressed suicidal ideation and a consult was requested  He states that he have lot of financial issues and he have suicidal ideation of on and off for a long time he denies any plan or intent  At this moment he denies any active suicidal thoughts plan or intent  He denies any psychotic symptom  Diagnosis:  Major 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 psychotic features  Plan:   Continue medical management  Discontinue one-to-one observation  Zyprexa 2 5 mg p o  HS this medication helped with the headache and with depression  Discussed with primary team  I will follow up  Risks, benefits and possible side effects of Medications:   Risks, benefits, and possible side effects of medications explained to patient and patient verbalizes understanding            Susan Sevilla MD

## 2019-10-15 NOTE — PROGRESS NOTES
Progress Note - Critical Care   Binnie Duane 45 y o  male MRN: [id number]  Unit/Bed#: ICU 03 Encounter: [id number]  Code Status: Level 1 - Full Code    Assessment: persistent left facial droop and hemiparesis with interval symptomatic improvement following intraparenchymal hemorrhagic stroke; will require speech evaluation prior to tolerating PO intake     Plan:     Neuro: Intraparenchymal brain hemorrhage   - Likely secondary to prolonged uncontrolled hypertension   - Denies smoking and recreational drug use   - Remains alert and oriented x4; GCS 15  - Interval improvement in neurological symtpoms (see physical exam)  - Continues to endorse right-sided retro-orbital pain   - HbA1c and lipid panel within normal limits  - Currently meeting blood pressure goals; nicardipine infusion for SBP <140  - Remains NPO prior to formal speech evaluation   - Repeat CT head this afternoon to evaluate for stability   - MRI brain to assess for underlying structural lesion as per Neurology   - Q1 neuro checks   - PT/OT evaluation when appropriate   - Regulate sleep-wake cycles     CV: No acute issues  - SBP 120s-140s; meets BP goals   - Nicardipine infusion for SBP < 140   - Will require education on tight blood pressure control as an outpatient     Lung: No acute issues   - Saturating >92% on room air   - Encourage incentive spirometry     GI: No acute issues   - Episode of nausea and vomiting overnight; likely secondary to eye pain   - Phenergan 25 mg Q6 PRN for nausea, vomiting, and headaches per neurology  - Denies constipation and diarrhea     FEN: No acute issues   - Remains NPO following failure of bedside dysphagia assessment   - Requires formal speech evaluation   - Lactated ringers 100 ml/hr  - I/O: +1,800 ml (IV), -2,501 ml (urine); -700 ml net   - Consider water deprivation test in the setting of intracranial hemorrhage   - Replete electrolytes PRN K>4, Mg>2, and Phos>3    : No acute issues  - Does not require catheterization   - Exceeds urine output goal of at least 0 5 ml/kg/hr    ID: No acute issues   - Remains afebrile; will defer CBC in favor of serial H&H  - No recent illnesses    Heme: No acute issues   - No known hematologic conditions   - PT, PTT, and INR within normal limits   - Latest hemoglobin 15 2; continue to trend   - SCDs for DVT prophylaxis; anticoagulation contraindicated  - Transfuse if hemoglobin <7    Endo: No acute issues   - HbA1c 4 2 on 10/14/2019  - Blood glucose remains within normal limits   - Avoid hyperglycemia with goal blood glucose <180     Msk/Skin: No acute issues   - Frequent repositioning   - Routine pressure ulcer prophylaxis          Disposition: continue critical care monitoring       ______________________________________________________________________    Chief Complaint: Persistent right-sided retro-orbital headache     HPI/24hr events: No telemetry events overnight  No acute events overnight, however did have an episode of nausea and vomiting secondary to persistent right-sided retro-orbital pain  Sitting comfortably in bed upon entry; urinating into collection container  Endorses eye pain, vision loss (persistent; see below), nausea, hiccups, and urinary frequency  Denies headaches, fevers, chills, dizziness, lightheadedness, chest pain, shortness of breath, abdominal pain, diarrhea, constipation, urgency, dysuria, leg pain, and leg swelling  Additionally, patient endorses depressed mood and feelings of worthlessness secondary to chronic headaches, as well as suicidal ideation within the last two months, however never developed a plan; not currently experiencing suicidal or homicidal ideation (inpatient psychiatry consultation placed)   Patient expressed understanding of medical plan and had no further questions at conclusion of encounter    ______________________________________________________________________    Physical Exam   Constitutional: He is oriented to person, place, and time  He appears well-developed and well-nourished  He appears distressed  Occasionally places hand over right eye and grimaces secondary to ongoing retro-orbital pain    HENT:   Head: Normocephalic and atraumatic  Mouth/Throat: Oropharynx is clear and moist  No oropharyngeal exudate  Eyes: Pupils are equal, round, and reactive to light  Conjunctivae and EOM are normal  No scleral icterus  Neck: Normal range of motion  Neck supple  Cardiovascular: Normal rate, regular rhythm, normal heart sounds and intact distal pulses  Exam reveals no gallop and no friction rub  No murmur heard  Bounding upper and lower extremity peripheral pulses bilaterally    Pulmonary/Chest: Effort normal and breath sounds normal  No stridor  No respiratory distress  He has no wheezes  Abdominal: Soft  Bowel sounds are normal  He exhibits no distension and no mass  There is no tenderness  Musculoskeletal: He exhibits no edema, tenderness or deformity  Neurological: He is alert and oriented to person, place, and time  He displays abnormal reflex  A cranial nerve deficit and sensory deficit is present  He exhibits abnormal muscle tone  Bilateral left visual field loss; complete loss of sensation over left forehead, cheek, and chin; persistent left facial droop with interval improvement in ability to raise left eye brow, wrinkle left forehead, and squeeze left eye shut (increased resistance to opening); left-sided tongue deviation (unable to deviate tongue to right side); weak shoulder shrug on left side; flaccid paralysis of left upper extremity with interval recovery of sensation over left upper extremity (biceps and triceps compartment); 4/5 left lower extremity strength, interval recovery of sensation over left lower extremity (left foot), and left-sided patellar hyperreflexia; able to wiggle left toes slightly on command     Skin: Skin is warm and dry  Capillary refill takes 2 to 3 seconds  No rash noted   He is not diaphoretic  No erythema  Psychiatric: He has a normal mood and affect  His behavior is normal    Currently denies both suicidal and homicidal ideation      ______________________________________________________________________  Vitals:    10/15/19 0600 10/15/19 0700 10/15/19 0715 10/15/19 0800   BP: 133/78 129/80  135/82   BP Location: Left arm   Left arm   Pulse: 58 (!) 54 70 64   Resp: (!) 10 (!) 10 16 13   Temp:   98 2 °F (36 8 °C)    TempSrc:   Oral    SpO2: 98% 98% 98% 97%   Weight:       Height:         Temperature:   Temp (24hrs), Av 1 °F (36 7 °C), Min:98 °F (36 7 °C), Max:98 2 °F (36 8 °C)    Current Temperature: 98 2 °F (36 8 °C)    Weights:   IBW: 66 1 kg    Body mass index is 24 kg/m²  Weight (last 2 days)     Date/Time   Weight    10/14/19 1651   69 5 (153 22)            Non-Invasive/Invasive Ventilation Settings:  Respiratory    Lab Data (Last 4 hours)    None         O2/Vent Data (Last 4 hours)    None              Intake and Outputs:  I/O       10/13 07 - 10/14 0700 10/14 07 - 10/15 0700    I V  (mL/kg)  1800 7 (25 9)    Total Intake(mL/kg)  1800 7 (25 9)    Urine (mL/kg/hr)  2500    Emesis/NG output  1    Total Output  2501    Net  -700 3          Unmeasured Emesis Occurrence  1 x        Nutrition:        Diet Orders   (From admission, onward)             Start     Ordered    10/15/19 0910  Diet Regular; Regular House  Diet effective now     Question Answer Comment   Diet Type Regular    Regular Regular House    RD to adjust diet per protocol?  Yes        10/15/19 0910              Labs:   Results from last 7 days   Lab Units 10/15/19  0533 10/14/19  1500   WBC Thousand/uL  --  5 20   HEMOGLOBIN g/dL 15 2 15 3   HEMATOCRIT % 42 4 43 6   PLATELETS Thousands/uL  --  147*      Results from last 7 days   Lab Units 10/15/19  0533 10/14/19  1500   POTASSIUM mmol/L 3 6 3 6   CHLORIDE mmol/L 104 105   CO2 mmol/L 23 28   BUN mg/dL 9 13   CREATININE mg/dL 0 87 1 18   CALCIUM mg/dL 8 8 9 5   ALK PHOS U/L  --  58   ALT U/L  --  26   AST U/L  --  19     Results from last 7 days   Lab Units 10/14/19  1500   INR  1 03   PTT seconds 37     Results from last 7 days   Lab Units 10/14/19  1500   TROPONIN I ng/mL <0 03     Imaging:     (10/14/2019) chest x-ray: No acute cardiopulmonary disease      (10/14/2019) CT stroke alert brain: Acute intraparenchymal hemorrhage centered at the right lentiform nucleus with mild surrounding vasogenic edema; no significant midline shift      (10/14/2019) CTA stroke alert (head/neck): Unremarkable CT angiogram of the brain; no CTA spot sign; unremarkable CT angiogram of the neck     EKG: None over the last 24 hours     Micro:  No results found for: Nitesh Matta SPUTUMCULTUR    Allergies:    Allergies   Allergen Reactions    Milk-Related Compounds Throat Swelling    Other      cats    Eggs Or Egg-Derived Products     Wheat Bran      Eats it sometimes       Medications:   Scheduled Meds:    Current Facility-Administered Medications:  acetaminophen 650 mg Oral Once Anjelica Adal, DO    butalbital-acetaminophen-caffeine 1 tablet Oral Q4H PRN Ines Callejasit-Me    fentanyl citrate (PF) 25 mcg Intravenous Q1H PRN Blas Bonner MD    lactated ringers 100 mL/hr Intravenous Continuous Anjelica Adal, DO Last Rate: 100 mL/hr (10/14/19 1749)   niCARdipine 1-15 mg/hr Intravenous Titrated Anjelica Adal, DO Last Rate: 2 5 mg/hr (10/15/19 0915)   ondansetron 4 mg Intravenous Q4H PRN Ines Juarez Petit-Me    promethazine 25 mg Intravenous Q6H PRN Pastora Campa MD      Continuous Infusions:    lactated ringers 100 mL/hr Last Rate: 100 mL/hr (10/14/19 1749)   niCARdipine 1-15 mg/hr Last Rate: 2 5 mg/hr (10/15/19 0915)     PRN Meds:    butalbital-acetaminophen-caffeine 1 tablet Q4H PRN   fentanyl citrate (PF) 25 mcg Q1H PRN   ondansetron 4 mg Q4H PRN   promethazine 25 mg Q6H PRN     VTE Pharmacologic Prophylaxis: Reason for no pharmacologic prophylaxis : intracranial hemorrhage  VTE Mechanical Prophylaxis: sequential compression device  Invasive lines and devices:   Invasive Devices     Peripheral Intravenous Line            Peripheral IV 10/14/19 Distal;Left;Ventral (anterior) Forearm less than 1 day    Peripheral IV 10/14/19 Left Forearm less than 1 day

## 2019-10-15 NOTE — CONSULTS
Consult- Earnest Munoz 1975, 45 y o  male MRN: [id number]    Unit/Bed#: ICU 03 Encounter: [id number]    Primary Care Provider: No primary care provider on file  Date and time admitted to hospital: 10/14/2019  4:46 PM      Inpatient consult to Neurosurgery  Consult performed by: KOFFI Arnold  Consult ordered by: Erik Sapp, DO          Suicidal ideation  Assessment & Plan  Recommend psych consult  Left-sided weakness  Assessment & Plan  See above  * Intraparenchymal hemorrhage of brain (HCC)  Assessment & Plan  · Right IPH, ICH 0  Presented yesterday to [facility] ED with left side weakness, "not feeling right" s/p lifting heavy weights in gym  · BP on presentation was 208/129  Denies history of hypertension, states he was told in 2012 he had "white coat syndrome "  · On exam with left side hemiparesis, visual field loss, headache, slight dysarthria, left facial droop  · Imaging reviewed by myself and attending as follows:  · CT head 10/14/19: acute intraparenchymal hemorrhage centered at the right lentiform nucleus with mild surrounding vasogenic edema  No significant mass effect or midline shift  · CTA 10/14/19: unremarkable  Examined with neuroradiologist and noted with right vertebral artery aneurysm  · Repeat CT at 1500 today  · STAT CT head if decrease in GCS > 2 points in 1 hour  · In light of right vertebral artery aneurysm, would recommend long term ASA therapy once CT head is stable  Repeat CTA in one week  · Currently on Cardene gtt  Recommend SBP < 160 mmHg  · Recommend psych consult for history of depression  · Appreciate neurology recommendations  · Neurosurgery will continue to follow  Please call with any questions or concerns      History of Present Illness     HPI: Earnest Munoz is a 45 y o  male with PMH including depression and migraine headaches who presented to [facility] ED with left facial and arm weakness and numbness and feeling "off " He states he woke up and at around 9:20 a m  In the morning he noticed that his left side felt weak any had a headache behind his right eye  He went to the gym until 11:00 a m  Where he was doing a bench press of 205 lb  He had not been to the gym in 2 weeks because of headaches he had been experiencing  He states that that point he realized something was wrong and drove himself to the emergency department  He states he has an extensive history of migraine headaches for approximately 5 years  He states about a week ago he was woken in the middle of the night by some neck pain and and a headache  The next day he went to a massage therapist and states the neck pain was completely resolved  He denies any history of hypertension  He states during his last exam with a primary care physician in 2012 he was told that he had white coat syndrome but has not had any follow-up since then  Currently he continues to complain of feeling off because of what happened yesterday    This is he states he has a headache behind his right eye but does not feel like his usual migraine headaches  He states he cannot see out of his left eye  He states when he tries to look at things everything mesh is together in his vision  Review of Systems    Historical Information   Past Medical History:   Diagnosis Date    Depression     Hypertension     Migraine      Past Surgical History:   Procedure Laterality Date    LITHOTRIPSY       Social History     Substance and Sexual Activity   Alcohol Use Not Currently     Social History     Substance and Sexual Activity   Drug Use Never     Social History     Tobacco Use   Smoking Status Never Smoker   Smokeless Tobacco Never Used     History reviewed  No pertinent family history      Meds/Allergies   all current active meds have been reviewed and current meds:   Current Facility-Administered Medications   Medication Dose Route Frequency    acetaminophen (TYLENOL) tablet 650 mg  650 mg Oral Once    acetaminophen (TYLENOL) tablet 650 mg  650 mg Oral Q6H PRN    amLODIPine (NORVASC) tablet 5 mg  5 mg Oral Daily    butalbital-acetaminophen-caffeine (FIORICET,ESGIC) -40 mg per tablet 1 tablet  1 tablet Oral Q4H PRN    fentanyl citrate (PF) 100 MCG/2ML 25 mcg  25 mcg Intravenous Q1H PRN    niCARdipine (CARDENE) 25 mg (STANDARD CONCENTRATION) in sodium chloride 0 9% 250 mL  1-15 mg/hr Intravenous Titrated    ondansetron (ZOFRAN) injection 4 mg  4 mg Intravenous Q4H PRN    promethazine (PHENERGAN) injection 25 mg  25 mg Intravenous Q6H PRN     Allergies   Allergen Reactions    Milk-Related Compounds Throat Swelling    Other      cats    Eggs Or Egg-Derived Products     Wheat Bran      Eats it sometimes         Objective   I/O       10/13 0701 - 10/14 0700 10/14 0701 - 10/15 0700 10/15 0701 - 10/16 0700    I V  (mL/kg)  1935 7 (27 9) 200 (2 9)    Total Intake(mL/kg)  1935 7 (27 9) 200 (2 9)    Urine (mL/kg/hr)  3000 575 (1 6)    Emesis/NG output  1     Total Output  3001 575    Net  -1065 3 -375           Unmeasured Emesis Occurrence  1 x           Physical Exam   Constitutional: He appears well-developed and well-nourished  No distress  HENT:   Head: Normocephalic and atraumatic  Eyes: Pupils are equal, round, and reactive to light  Conjunctivae are normal  Right eye exhibits no discharge  Left eye exhibits no discharge  Neck: Normal range of motion  Neck supple  Cardiovascular: Normal rate and regular rhythm  Pulmonary/Chest: Effort normal and breath sounds normal  No respiratory distress  Abdominal: Soft  Bowel sounds are normal  He exhibits no distension  There is no tenderness  Musculoskeletal: Normal range of motion  Neurological: He is alert  He displays normal reflexes  A cranial nerve deficit and sensory deficit is present  He exhibits normal muscle tone   He has a normal Finger-Nose-Finger Test  Coordination abnormal    Reflex Scores:       Tricep reflexes are 2+ on the right side and 2+ on the left side  Bicep reflexes are 2+ on the right side and 2+ on the left side  Brachioradialis reflexes are 2+ on the right side and 2+ on the left side  Patellar reflexes are 2+ on the right side and 2+ on the left side  Achilles reflexes are 2+ on the right side and 2+ on the left side  Skin: Skin is warm and dry  He is not diaphoretic  Psychiatric: His speech is slurred  Neurologic Exam     Mental Status   Oriented to person  Oriented to place  Disoriented to time  Disoriented to year and date  Oriented to month  Registration: recalls 3 of 3 objects  Follows 2 step commands  Attention: normal  Concentration: normal    Speech: slurred   Level of consciousness: drowsy  Knowledge: good and consistent with education  Able to name object  Cranial Nerves     CN II   Visual acuity: normal  Left visual field deficit: upper nasal, lower nasal, upper temporal and lower temporal quadrant(s)    CN III, IV, VI   Pupils are equal, round, and reactive to light  Right pupil: Size: 3 mm  Shape: regular  Reactivity: brisk  Consensual response: intact  Accommodation: intact  Left pupil: Size: 3 mm  Shape: regular  Reactivity: brisk  Consensual response: intact  Accommodation: intact     Nystagmus: none   Diplopia: none  Conjugate gaze: present    CN V   Right facial sensation deficit: none  Left facial sensation deficit: complete  Left corneal reflex: normal    CN VII   Left facial weakness: central    CN VIII   Hearing: intact    CN IX, X   Palate: symmetric    CN XI   Right sternocleidomastoid strength: normal  Left sternocleidomastoid strength: weak  Right trapezius strength: normal  Left trapezius strength: weak    CN XII   Tongue: not atrophic  Fasciculations: absent  Tongue deviation: right    Motor Exam   Muscle bulk: normal  Overall muscle tone: normal  Right arm pronator drift: absent  Left arm pronator drift: absent    Strength   Right deltoid: 5/5  Left deltoid: 0/5  Right biceps: 5/5  Left biceps: 0/5  Right triceps: 5/5  Left triceps: 0/5  Right quadriceps: 5/5  Left quadriceps: 3/5  Right hamstrin/5  Left hamstring: 3/5  Right anterior tibial: 5/5  Left anterior tibial: 3/5  Right posterior tibial: 5/5  Left posterior tibial: 3/5  Right peroneal: 5/5  Left peroneal: 1/5  Right gastroc: 5/5  Left gastroc: 1/5    Sensory Exam   Light touch normal    Proprioception normal      Gait, Coordination, and Reflexes     Coordination   Finger to nose coordination: normal    Tremor   Resting tremor: absent  Intention tremor: absent  Action tremor: absent    Reflexes   Right brachioradialis: 2+  Left brachioradialis: 2+  Right biceps: 2+  Left biceps: 2+  Right triceps: 2+  Left triceps: 2+  Right patellar: 2+  Left patellar: 2+  Right achilles: 2+  Left achilles: 2+  Right : 2+  Left : 2+  Right [place]: absent  Left [place]: absent  Right ankle clonus: absent  Left ankle clonus: absent      Vitals:Blood pressure 112/64, pulse 76, temperature 98 2 °F (36 8 °C), temperature source Oral, resp  rate 15, height 5' 7" (1 702 m), weight 69 5 kg (153 lb 3 5 oz), SpO2 98 %  ,Body mass index is 24 kg/m²       Lab Results:   Results from last 7 days   Lab Units 10/15/19  0533 10/14/19  1500   WBC Thousand/uL  --  5 20   HEMOGLOBIN g/dL 15 2 15 3   HEMATOCRIT % 42 4 43 6   PLATELETS Thousands/uL  --  147*     Results from last 7 days   Lab Units 10/15/19  0533 10/14/19  1500   POTASSIUM mmol/L 3 6 3 6   CHLORIDE mmol/L 104 105   CO2 mmol/L 23 28   BUN mg/dL 9 13   CREATININE mg/dL 0 87 1 18   CALCIUM mg/dL 8 8 9 5   ALK PHOS U/L  --  58   ALT U/L  --  26   AST U/L  --  19     Results from last 7 days   Lab Units 10/15/19  0533   MAGNESIUM mg/dL 1 9     Results from last 7 days   Lab Units 10/15/19  0533   PHOSPHORUS mg/dL 3 1     Results from last 7 days   Lab Units 10/14/19  1500   INR  1 03   PTT seconds 37     No results found for: TROPONINT  ABG:No results found for: PHART, FTD1BQH, PO2ART, FRZ0GRD, J9KELXRL, BEART, SOURCE    Imaging Studies: I have personally reviewed pertinent reports  and I have personally reviewed pertinent films in PACS    X-ray Chest 1 View Portable    Result Date: 10/14/2019  Impression: No acute cardiopulmonary disease  Workstation performed: [id number]     Ct Stroke Alert Brain    Result Date: 10/14/2019  Impression: Acute intraparenchymal hemorrhage centered at the right lentiform nucleus with mild surrounding vasogenic edema  No significant mass effect or midline shift  Findings were directly discussed with GRAHAM NAVARRO on 10/14/2019 2:59 PM  Workstation performed: [id number]     Cta Stroke Alert (head/neck)    Result Date: 10/14/2019  Impression: 1  Unremarkable CT angiogram of the brain  No CTA spot sign  2   Unremarkable CT angiogram of the neck  Findings were directly discussed with Dr Alexis Sheridan on 10/14/2019 3:20 PM  Workstation performed: [id number]       EKG, Pathology, and Other Studies: I have personally reviewed pertinent reports  and I have personally reviewed pertinent films in PACS    VTE Prophylaxis: Sequential compression device (Venodyne)     Code Status: Level 1 - Full Code  Advance Directive and Living Will:      Power of :    POLST:      Counseling / Coordination of Care  I spent 30 minutes with the patient

## 2019-10-16 ENCOUNTER — APPOINTMENT (INPATIENT)
Dept: RADIOLOGY | Facility: HOSPITAL | Age: 44
DRG: 044 | End: 2019-10-16
Payer: COMMERCIAL

## 2019-10-16 PROBLEM — G43.909 MIGRAINE: Status: ACTIVE | Noted: 2019-10-16

## 2019-10-16 LAB
ANION GAP SERPL CALCULATED.3IONS-SCNC: 7 MMOL/L (ref 4–13)
ATRIAL RATE: 43 BPM
BASOPHILS # BLD AUTO: 0.02 THOUSANDS/ΜL (ref 0–0.1)
BASOPHILS NFR BLD AUTO: 0 % (ref 0–1)
BUN SERPL-MCNC: 16 MG/DL (ref 5–25)
CALCIUM SERPL-MCNC: 8.8 MG/DL (ref 8.3–10.1)
CHLORIDE SERPL-SCNC: 110 MMOL/L (ref 100–108)
CO2 SERPL-SCNC: 26 MMOL/L (ref 21–32)
CREAT SERPL-MCNC: 1.1 MG/DL (ref 0.6–1.3)
EOSINOPHIL # BLD AUTO: 0 THOUSAND/ΜL (ref 0–0.61)
EOSINOPHIL NFR BLD AUTO: 0 % (ref 0–6)
ERYTHROCYTE [DISTWIDTH] IN BLOOD BY AUTOMATED COUNT: 13.2 % (ref 11.6–15.1)
GFR SERPL CREATININE-BSD FRML MDRD: 82 ML/MIN/1.73SQ M
GLUCOSE SERPL-MCNC: 84 MG/DL (ref 65–140)
HCT VFR BLD AUTO: 42.3 % (ref 36.5–49.3)
HGB BLD-MCNC: 14.7 G/DL (ref 12–17)
IMM GRANULOCYTES # BLD AUTO: 0.01 THOUSAND/UL (ref 0–0.2)
IMM GRANULOCYTES NFR BLD AUTO: 0 % (ref 0–2)
LYMPHOCYTES # BLD AUTO: 1.68 THOUSANDS/ΜL (ref 0.6–4.47)
LYMPHOCYTES NFR BLD AUTO: 25 % (ref 14–44)
MAGNESIUM SERPL-MCNC: 2.2 MG/DL (ref 1.6–2.6)
MCH RBC QN AUTO: 30.8 PG (ref 26.8–34.3)
MCHC RBC AUTO-ENTMCNC: 34.8 G/DL (ref 31.4–37.4)
MCV RBC AUTO: 89 FL (ref 82–98)
MONOCYTES # BLD AUTO: 0.55 THOUSAND/ΜL (ref 0.17–1.22)
MONOCYTES NFR BLD AUTO: 8 % (ref 4–12)
NEUTROPHILS # BLD AUTO: 4.5 THOUSANDS/ΜL (ref 1.85–7.62)
NEUTS SEG NFR BLD AUTO: 67 % (ref 43–75)
NRBC BLD AUTO-RTO: 0 /100 WBCS
PHOSPHATE SERPL-MCNC: 2.7 MG/DL (ref 2.7–4.5)
PLATELET # BLD AUTO: 145 THOUSANDS/UL (ref 149–390)
PMV BLD AUTO: 9.6 FL (ref 8.9–12.7)
POTASSIUM SERPL-SCNC: 3.5 MMOL/L (ref 3.5–5.3)
QRS AXIS: 82 DEGREES
QRSD INTERVAL: 88 MS
QT INTERVAL: 508 MS
QTC INTERVAL: 430 MS
RBC # BLD AUTO: 4.78 MILLION/UL (ref 3.88–5.62)
SODIUM SERPL-SCNC: 143 MMOL/L (ref 136–145)
T WAVE AXIS: 75 DEGREES
VENTRICULAR RATE: 43 BPM
WBC # BLD AUTO: 6.76 THOUSAND/UL (ref 4.31–10.16)

## 2019-10-16 PROCEDURE — 99255 IP/OBS CONSLTJ NEW/EST HI 80: CPT | Performed by: PHYSICAL MEDICINE & REHABILITATION

## 2019-10-16 PROCEDURE — 99232 SBSQ HOSP IP/OBS MODERATE 35: CPT | Performed by: PSYCHIATRY & NEUROLOGY

## 2019-10-16 PROCEDURE — 83735 ASSAY OF MAGNESIUM: CPT | Performed by: PHYSICIAN ASSISTANT

## 2019-10-16 PROCEDURE — 99231 SBSQ HOSP IP/OBS SF/LOW 25: CPT | Performed by: PSYCHIATRY & NEUROLOGY

## 2019-10-16 PROCEDURE — 93005 ELECTROCARDIOGRAM TRACING: CPT

## 2019-10-16 PROCEDURE — 99233 SBSQ HOSP IP/OBS HIGH 50: CPT | Performed by: EMERGENCY MEDICINE

## 2019-10-16 PROCEDURE — 85025 COMPLETE CBC W/AUTO DIFF WBC: CPT | Performed by: PHYSICIAN ASSISTANT

## 2019-10-16 PROCEDURE — 99233 SBSQ HOSP IP/OBS HIGH 50: CPT | Performed by: NURSE PRACTITIONER

## 2019-10-16 PROCEDURE — NC001 PR NO CHARGE: Performed by: EMERGENCY MEDICINE

## 2019-10-16 PROCEDURE — 70450 CT HEAD/BRAIN W/O DYE: CPT

## 2019-10-16 PROCEDURE — 93010 ELECTROCARDIOGRAM REPORT: CPT | Performed by: INTERNAL MEDICINE

## 2019-10-16 PROCEDURE — 80048 BASIC METABOLIC PNL TOTAL CA: CPT | Performed by: PHYSICIAN ASSISTANT

## 2019-10-16 PROCEDURE — 84100 ASSAY OF PHOSPHORUS: CPT | Performed by: PHYSICIAN ASSISTANT

## 2019-10-16 RX ORDER — METOCLOPRAMIDE HYDROCHLORIDE 5 MG/ML
10 INJECTION INTRAMUSCULAR; INTRAVENOUS EVERY 8 HOURS SCHEDULED
Status: DISCONTINUED | OUTPATIENT
Start: 2019-10-16 | End: 2019-10-16

## 2019-10-16 RX ORDER — POTASSIUM CHLORIDE 20 MEQ/1
40 TABLET, EXTENDED RELEASE ORAL ONCE
Status: COMPLETED | OUTPATIENT
Start: 2019-10-16 | End: 2019-10-16

## 2019-10-16 RX ORDER — METHYLPREDNISOLONE 4 MG/1
4 TABLET ORAL DAILY
Status: DISCONTINUED | OUTPATIENT
Start: 2019-10-21 | End: 2019-10-19 | Stop reason: HOSPADM

## 2019-10-16 RX ORDER — METOCLOPRAMIDE HYDROCHLORIDE 5 MG/ML
10 INJECTION INTRAMUSCULAR; INTRAVENOUS ONCE
Status: COMPLETED | OUTPATIENT
Start: 2019-10-16 | End: 2019-10-16

## 2019-10-16 RX ORDER — OXYCODONE HYDROCHLORIDE 5 MG/1
5 TABLET ORAL EVERY 4 HOURS PRN
Status: DISCONTINUED | OUTPATIENT
Start: 2019-10-16 | End: 2019-10-16

## 2019-10-16 RX ORDER — AMLODIPINE BESYLATE 5 MG/1
5 TABLET ORAL ONCE
Status: COMPLETED | OUTPATIENT
Start: 2019-10-16 | End: 2019-10-16

## 2019-10-16 RX ORDER — METHYLPREDNISOLONE 4 MG/1
12 TABLET ORAL DAILY
Status: COMPLETED | OUTPATIENT
Start: 2019-10-19 | End: 2019-10-19

## 2019-10-16 RX ORDER — DIPHENHYDRAMINE HYDROCHLORIDE 50 MG/ML
12.5 INJECTION INTRAMUSCULAR; INTRAVENOUS
Status: DISPENSED | OUTPATIENT
Start: 2019-10-16 | End: 2019-10-17

## 2019-10-16 RX ORDER — METHYLPREDNISOLONE 4 MG/1
8 TABLET ORAL DAILY
Status: DISCONTINUED | OUTPATIENT
Start: 2019-10-20 | End: 2019-10-19 | Stop reason: HOSPADM

## 2019-10-16 RX ORDER — MAGNESIUM SULFATE 1 G/100ML
1 INJECTION INTRAVENOUS ONCE
Status: COMPLETED | OUTPATIENT
Start: 2019-10-16 | End: 2019-10-16

## 2019-10-16 RX ORDER — POTASSIUM CHLORIDE 20 MEQ/1
20 TABLET, EXTENDED RELEASE ORAL ONCE
Status: COMPLETED | OUTPATIENT
Start: 2019-10-16 | End: 2019-10-16

## 2019-10-16 RX ORDER — AMLODIPINE BESYLATE 10 MG/1
10 TABLET ORAL DAILY
Status: DISCONTINUED | OUTPATIENT
Start: 2019-10-17 | End: 2019-10-19 | Stop reason: HOSPADM

## 2019-10-16 RX ORDER — DIPHENHYDRAMINE HYDROCHLORIDE 50 MG/ML
25 INJECTION INTRAMUSCULAR; INTRAVENOUS EVERY 8 HOURS PRN
Status: DISCONTINUED | OUTPATIENT
Start: 2019-10-16 | End: 2019-10-16

## 2019-10-16 RX ORDER — METHYLPREDNISOLONE 16 MG/1
16 TABLET ORAL DAILY
Status: COMPLETED | OUTPATIENT
Start: 2019-10-18 | End: 2019-10-18

## 2019-10-16 RX ORDER — OXYCODONE HYDROCHLORIDE 5 MG/1
2.5 TABLET ORAL EVERY 4 HOURS PRN
Status: DISCONTINUED | OUTPATIENT
Start: 2019-10-16 | End: 2019-10-16

## 2019-10-16 RX ADMIN — AMLODIPINE BESYLATE 5 MG: 5 TABLET ORAL at 09:16

## 2019-10-16 RX ADMIN — LABETALOL 20 MG/4 ML (5 MG/ML) INTRAVENOUS SYRINGE 10 MG: at 07:49

## 2019-10-16 RX ADMIN — ACETAMINOPHEN 975 MG: 325 TABLET ORAL at 09:16

## 2019-10-16 RX ADMIN — DIPHENHYDRAMINE HYDROCHLORIDE 25 MG: 50 INJECTION, SOLUTION INTRAMUSCULAR; INTRAVENOUS at 20:46

## 2019-10-16 RX ADMIN — POTASSIUM CHLORIDE 20 MEQ: 1500 TABLET, EXTENDED RELEASE ORAL at 12:08

## 2019-10-16 RX ADMIN — HYDRALAZINE HYDROCHLORIDE 10 MG: 20 INJECTION INTRAMUSCULAR; INTRAVENOUS at 05:48

## 2019-10-16 RX ADMIN — POTASSIUM CHLORIDE 40 MEQ: 1500 TABLET, EXTENDED RELEASE ORAL at 09:27

## 2019-10-16 RX ADMIN — ONDANSETRON 4 MG: 2 INJECTION INTRAMUSCULAR; INTRAVENOUS at 07:43

## 2019-10-16 RX ADMIN — FENTANYL CITRATE 25 MCG: 50 INJECTION INTRAMUSCULAR; INTRAVENOUS at 07:40

## 2019-10-16 RX ADMIN — BUTALBITAL, ACETAMINOPHEN AND CAFFEINE 1 TABLET: 50; 325; 40 TABLET ORAL at 16:49

## 2019-10-16 RX ADMIN — OLANZAPINE 2.5 MG: 2.5 TABLET, FILM COATED ORAL at 21:04

## 2019-10-16 RX ADMIN — METOCLOPRAMIDE 10 MG: 5 INJECTION, SOLUTION INTRAMUSCULAR; INTRAVENOUS at 23:39

## 2019-10-16 RX ADMIN — BUTALBITAL, ACETAMINOPHEN AND CAFFEINE 1 TABLET: 50; 325; 40 TABLET ORAL at 07:40

## 2019-10-16 RX ADMIN — HYDRALAZINE HYDROCHLORIDE 10 MG: 20 INJECTION INTRAMUSCULAR; INTRAVENOUS at 00:47

## 2019-10-16 RX ADMIN — MAGNESIUM SULFATE HEPTAHYDRATE 1 G: 1 INJECTION, SOLUTION INTRAVENOUS at 20:45

## 2019-10-16 RX ADMIN — METHYLPREDNISOLONE 24 MG: 4 TABLET ORAL at 12:08

## 2019-10-16 RX ADMIN — DIPHENHYDRAMINE HYDROCHLORIDE 12.5 MG: 50 INJECTION, SOLUTION INTRAMUSCULAR; INTRAVENOUS at 23:39

## 2019-10-16 RX ADMIN — HYDRALAZINE HYDROCHLORIDE 10 MG: 20 INJECTION INTRAMUSCULAR; INTRAVENOUS at 19:45

## 2019-10-16 RX ADMIN — HYDRALAZINE HYDROCHLORIDE 10 MG: 20 INJECTION INTRAMUSCULAR; INTRAVENOUS at 23:49

## 2019-10-16 RX ADMIN — BUTALBITAL, ACETAMINOPHEN AND CAFFEINE 1 TABLET: 50; 325; 40 TABLET ORAL at 21:04

## 2019-10-16 RX ADMIN — AMLODIPINE BESYLATE 5 MG: 5 TABLET ORAL at 09:47

## 2019-10-16 NOTE — PROGRESS NOTES
Progress Note - Neurology   Alicja Meraz 45 y o  male MRN: [id number]  Unit/Bed#: ICU 03 Encounter: [id number]    Assessment:  A 45years old male with past medical history of white coat hypertension not on treatment and depression as well as chronic headache presented with left upper extremity weakness and left facial droop found to have right intraparenchymal hemorrhage current lately in the ICU on amlodipine  Plan:  # Right Lenticular IPH  · Secondary to likely poorly uncontrolled hypertension  · Control blood pressure less than 140  · Consider adding lisinopril 5 mg  to amlodipine for cardiovascular benefit  · Repeat MRI showed increase of right lentiform nucleus hematoma with 22 mL volume with mild surrounding edema and mass effect on the right ventricular system with asymmetric dilatation of right temporal horn which may represent early entrapment  · Tylenol 975 mg t i d  P r n  For headache  · Repeat CT head today no significant change in size of right basal ganglia intraparenchymal hematoma since 10/15/2019  · Frequent neuro q 4 hours  · Phenergan 25 mg IV p r n  Q 6 for nausea and headache  · No antiplatelet or anticoagulation for now     # depression with suicidal ideas  · Management for primary team    Subjective:   Patient is seen and examined at bedside this morning  Has been off nicardipine yesterday, S blood pressure fluctuate 130-150 requiring p r n  Labetalol as well as hydralazine  Reported improvement in his left upper extremity weakness  His still has nausea and infrequent right retro-orbital headache improved with fentanyl  Vitals: Blood pressure 158/92, pulse 68, temperature 98 1 °F (36 7 °C), temperature source Oral, resp  rate 16, height 5' 7" (1 702 m), weight 69 5 kg (153 lb 3 5 oz), SpO2 98 %  ,Body mass index is 24 kg/m²      Physical Exam: /83 (BP Location: Right arm)   Pulse 82   Temp 98 2 °F (36 8 °C) (Oral)   Resp (!) 10   Ht 5' 7" (1 702 m)   Wt 69 5 kg (153 lb 3 5 oz)   SpO2 98%   BMI 24 00 kg/m²   General appearance: alert and oriented, in no acute distress  Head: Normocephalic, without obvious abnormality, atraumatic  Eyes: Extraocular muscle intact  Lungs: clear to auscultation bilaterally  Heart: regular rate and rhythm, S1, S2 normal, no murmur, click, rub or gallop  Abdomen: Soft, nontender, nondistended  Extremities: No lower extremity edema  Neurologic: Mental status: Alert, oriented, thought content appropriate  Sensory: Loss of sensation in the left upper and lower extremity, normal sensation to touch the right upper and lower extremities  Motor: Strength 5/5 throughout bilaterally  Reflexes: 2+ and symmetric    Lab, Imaging and other studies:   I have personally reviewed pertinent reports  , CBC:   Results from last 7 days   Lab Units 10/16/19  0511 10/15/19  0533 10/14/19  1500   WBC Thousand/uL 6 76  --  5 20   RBC Million/uL 4 78  --  4 90   HEMOGLOBIN g/dL 14 7 15 2 15 3   HEMATOCRIT % 42 3 42 4 43 6   MCV fL 89  --  89   PLATELETS Thousands/uL 145*  --  147*   , BMP/CMP:   Results from last 7 days   Lab Units 10/16/19  0511 10/15/19  0533 10/14/19  1500   SODIUM mmol/L 143 137 141   POTASSIUM mmol/L 3 5 3 6 3 6   CHLORIDE mmol/L 110* 104 105   CO2 mmol/L 26 23 28   BUN mg/dL 16 9 13   CREATININE mg/dL 1 10 0 87 1 18   CALCIUM mg/dL 8 8 8 8 9 5   AST U/L  --   --  19   ALT U/L  --   --  26   ALK PHOS U/L  --   --  58   EGFR ml/min/1 73sq m 82 106 76     VTE Prophylaxis: Sequential compression device (Venodyne)  and Reason for no pharmacologic prophylaxis Intra parenchymal hemorrhage    Counseling / Coordination of Care  Total time spent today 20 minutes  Greater than 50% of total time was spent with the patient and / or family counseling and / or coordination of care  A description of the counseling / coordination of care: Catrina Shah MD  Available on [employer]  THE [facility] [place]  Internal medicine resident

## 2019-10-16 NOTE — CONSULTS
PHYSICAL MEDICINE AND REHABILITATION CONSULT NOTE  Jovita Lora 45 y o  male MRN: [id number]  Unit/Bed#: ICU 03 Encounter: [id number]    Requested by (Physician/Service): Ramiro Medina MD  Reason for Consultation:  Assessment of rehabilitation needs    Assessment:  Rehabilitation Diagnosis: Right IPH      Recommendations:  Rehabilitation Plan:  The patient will likely be a good candidate for acute inpatient rehabilitation once medically stable  He is from the [facility] and may be interested in facilities in that area  Follow-up in outpatient physiatry clinic - added to AVS    Medical Co-morbidities:  Right IPH with left sided weakness  Uncontrolled HTN  Suicidal ideation psychiatry following  Migraines   Depression  Anxiety    Bowel plan:  Monitor off of medicaitons  Bladder plan:  No cates, pt is continent  DVT ppx: SCD only due to bleed    Thank you for this consultation  Do not hesitate to contact service with further questions  KOFFI Yusuf    History of Present Illness:  Jovita Lora is a 45 y o  male with a PMH of migraine and depression presented to the [place] [address] ER with acute onset of left sided facial weakness, left arm weakness after lifting weights  CT of the head showed an acute IPH in the right letiform nucleus with mild surrounding vasogenic edema  He was transferred to [facility] for neurosurgical evaluation  He was placed on a cardene drip as he was hypertensive with a BP of 208/129 on presentation  Repeat CT showed slight interval hemorrhagic enlargement  There is no surgical intervention planned at this time  PM&R were consulted for rehabilitation recommendations  The patient was seen in the ICU  He reports that his left side is weak  He denies headache or pain currently  He is fatigued and reports poor sleep      Review of Systems: 10 point ROS negative except for what is noted in HPI    Function:  Prior level of function and living situation:  Living with nephew in a ranch style home  His nephew does work  Current level of function:  Physical Therapy:  Max assist for transferes  Occupational Therapy: Mod assist for dressing, min for eating, max for transfers  Speech Therapy: regular with thins      Physical Exam:  /68   Pulse 66   Temp 98 5 °F (36 9 °C) (Oral)   Resp (!) 11   Ht 5' 7" (1 702 m)   Wt 69 5 kg (153 lb 3 5 oz)   SpO2 98%   BMI 24 00 kg/m²        Intake/Output Summary (Last 24 hours) at 10/16/2019 1009  Last data filed at 10/16/2019 0800  Gross per 24 hour   Intake 360 ml   Output 1500 ml   Net -1140 ml       Body mass index is 24 kg/m²  Gen: No acute distress, Well-nourished, well-appearing  HEENT:  Normocephalic/Atraumatic, EOMI, Moist mucus membranes  Cardiovascular: Regular rate, rhythm  Extremities: No edema  Pulmonary: Non-labored breathing  GI: Soft, non-tender, non-distended  MSK: ROM decreased LUE  Integumentary: Skin is warm, dry  No rashes or ulcers  Neuro/Motor: AAOx3, Speech is fluent  Patient is following commands  Left UE 2+3/5, LLE 3/5 throughout  Psych: Normal mood and affect         Social History:    Social History     Socioeconomic History    Marital status: Single     Spouse name: None    Number of children: None    Years of education: None    Highest education level: None   Occupational History    None   Social Needs    Financial resource strain: None    Food insecurity:     Worry: None     Inability: None    Transportation needs:     Medical: None     Non-medical: None   Tobacco Use    Smoking status: Never Smoker    Smokeless tobacco: Never Used   Substance and Sexual Activity    Alcohol use: Not Currently    Drug use: Never    Sexual activity: None   Lifestyle    Physical activity:     Days per week: None     Minutes per session: None    Stress: None   Relationships    Social connections:     Talks on phone: None     Gets together: None     Attends [employer]ism service: None     Active member of club or organization: None     Attends meetings of clubs or organizations: None     Relationship status: None    Intimate partner violence:     Fear of current or ex partner: None     Emotionally abused: None     Physically abused: None     Forced sexual activity: None   Other Topics Concern    None   Social History Narrative    None        Family History:    History reviewed  No pertinent family history        Medications:     Current Facility-Administered Medications:     [START ON 10/17/2019] amLODIPine (NORVASC) tablet 10 mg, 10 mg, Oral, Daily, Zaria Crump MD    butalbital-acetaminophen-caffeine (FIORICET,ESGIC) -40 mg per tablet 1 tablet, 1 tablet, Oral, Q4H PRN, Higinio Parra Petit-Me, 1 tablet at 10/16/19 0740    hydrALAZINE (APRESOLINE) injection 10 mg, 10 mg, Intravenous, Q4H PRN, David Braxton MD, 10 mg at 10/16/19 0548    methylprednisolone (MEDROL) tablet 24 mg, 24 mg, Oral, Daily **FOLLOWED BY** [START ON 10/17/2019] methylprednisolone (MEDROL) tablet 20 mg, 20 mg, Oral, Daily **FOLLOWED BY** [START ON 10/18/2019] methylprednisolone (MEDROL) tablet 16 mg, 16 mg, Oral, Daily **FOLLOWED BY** [START ON 10/19/2019] methylprednisolone (MEDROL) tablet 12 mg, 12 mg, Oral, Daily **FOLLOWED BY** [START ON 10/20/2019] methylprednisolone (MEDROL) tablet 8 mg, 8 mg, Oral, Daily **FOLLOWED BY** [START ON 10/21/2019] methylprednisolone (MEDROL) tablet 4 mg, 4 mg, Oral, Daily, Zaria Crump MD    OLANZapine (ZyPREXA) tablet 2 5 mg, 2 5 mg, Oral, HS, Elaina Vinson MD, 2 5 mg at 10/15/19 8632    potassium chloride (K-DUR,KLOR-CON) CR tablet 20 mEq, 20 mEq, Oral, Once, Zaria Crump MD    Past Medical History:     Past Medical History:   Diagnosis Date    Depression     Hypertension     Migraine         Past Surgical History:     Past Surgical History:   Procedure Laterality Date    LITHOTRIPSY           Allergies: Allergies   Allergen Reactions    Milk-Related Compounds Throat Swelling    Other      cats    Eggs Or Egg-Derived Products     Wheat Bran      Eats it sometimes             LABORATORY RESULTS:      Lab Results   Component Value Date    HGB 14 7 10/16/2019    HCT 42 3 10/16/2019    WBC 6 76 10/16/2019     Lab Results   Component Value Date    BUN 16 10/16/2019    K 3 5 10/16/2019     (H) 10/16/2019    CREATININE 1 10 10/16/2019     Lab Results   Component Value Date    PROTIME 12 0 10/14/2019    INR 1 03 10/14/2019        DIAGNOSTIC STUDIES: Reviewed  X-ray Chest 1 View Portable    Result Date: 10/14/2019  Impression: No acute cardiopulmonary disease  Workstation performed: [id number]     Ct Head Wo Contrast    Result Date: 10/16/2019  Impression: Interval expansion of the right lentiform nucleus hematoma with increase in volume from 9 4 mL 219 3 mL  There is slightly greater surrounding edema and mass effect on the right ventricular system and MCA cistern without herniation  Short-term follow-up evaluation is recommended to assess for stability  I personally discussed this study with Dr Desiree Gibbs on 10/16/2019 at 8:29 AM  Workstation performed: [id number]     Mri Brain Wo Contrast    Result Date: 10/16/2019  Impression: Interval expansion of the right lentiform nucleus hematoma with hyperacute, acute and subacute blood products  The hematoma cavity demonstrates interval expansion when compared to the recent CT scan, now measuring 22 mL  Presumed etiology is hypertension however, correlation for underlying coagulopathy is recommended  There is mild surrounding edema and mass effect on the right ventricular system with asymmetric dilatation of the right temporal horn which may represent early entrapment  Follow-up CT evaluation is recommended    I personally discussed this study with Dr Desiree Gibbs on 10/16/2019 at 8:13 AM  Workstation performed: [id number]     Ct Stroke Alert Brain    Result Date: 10/14/2019  Impression: Acute intraparenchymal hemorrhage centered at the right lentiform nucleus with mild surrounding vasogenic edema  No significant mass effect or midline shift  Findings were directly discussed with GRAHAM NAVARRO on 10/14/2019 2:59 PM  Workstation performed: [id number]     Cta Stroke Alert (head/neck)    Result Date: 10/14/2019  Impression: 1  Unremarkable CT angiogram of the brain  No CTA spot sign  2   Unremarkable CT angiogram of the neck     Findings were directly discussed with Dr Raul Kendall on 10/14/2019 3:20 PM  Workstation performed: [id number]

## 2019-10-16 NOTE — PROGRESS NOTES
Progress Note - Critical Care   Nancy Fernandez 45 y o  male MRN: [id number]  Unit/Bed#: ICU 03 Encounter: [id number]  Code Status: Level 1 - Full Code    Assessment: profound interval improvement in neurological examination, however continues to endorse right-sided retro-orbital pain; no longer requires critical level of care     Plan:     Neuro: Intraparenchymal brain hemorrhage   - Alert and oriented x3 (person, place, and reason for hospitalization; unsure of day of the week or date, however knows month, year, and president); GCS 15  - Interval improvement in neurological symptoms (see physical exam)  - Continues to endorse right-sided retro-orbital pain, however has not received PRN fentanyl since 10/15 at 1400   - Likely contributing to persistently elevated blood pressures; will consider solumedrol dose pack   - 24-hour repeat CT head without contrast demonstrates apparent slight interval hemorrhagic enlargement, however patient demonstrates interval clinical improvement; awaiting official interpretation of both repeat CT head and MRI brain  - PT/OT evaluation appropriate at this time   - Regulate sleep wake cycles     CV: Persistent hypertension (SBP >140)  - SBP 140s-150s overnight; required 3 doses of hydralazine   - Likely secondary to persistent severe (8-9/10) retro-orbital pain (patient has not received PRN fentanyl since 1400 on 10/15)  - Hydralazine PRN for SBP >140   - Will require education on tight blood pressure control as an outpatient     Lung: No acute issues   - Saturating >92% on room air   - Encourage incentive spirometry     GI: NO acute issues  - Occasional nausea; has not vomited over last 24 hours   - Phenergan PRN for nausea and vomiting   - Denies diarrhea and constipation     FEN: No acute issues  - Advanced to regular house diet   - No longer receiving maintenance fluids   - I/O: +400 ml; -1,775 ml (-1,374 ml net; -2,439 ml total)   - Consider urine osm analysis     : No acute issues - Producing clear yellow urine   - Continues to exceed urine output goal of at least 0 5 ml/kg/hr    ID: No acute issues   - Remains afebrile without leukocytosis; serial CBCs not indicated at this time   - No recent illnesses     Heme: No acute issues   - Latest hemoglobin 14 7 (from 15 2)  - SCDs for DVT prophylaxis; continue to hold anticoagulation   - Transfuse if hemoglobin <7    Endo: No acute issues   - Blood glucose remains within normal limits   - Avoid hyperglycemia; goal blood glucose <180     Msk/Skin: No acute issues   - Frequent repositioning   - Routine pressure ulcer prophylaxis         Disposition: medically stable for decreased level of care       ______________________________________________________________________    Chief Complaint: Persistent right-sided retro-orbital pain     HPI/24hr events: No telemetry events overnight  No acute events overnight, however did require three PRN doses of hydralazine overnight for systolic blood pressures >093  Additionally received QHS dose of zyprexa and subsequently slept through the night  24-hour repeat CT head without contrast demonstrates apparent slight interval hemorrhagic enlargement, however patient demonstrates interval clinical improvement; awaiting official interpretation of both repeat CT head and MRI brain  Lying supine in bed upon entry with ice pack pressed over right eye  Endorses persistent right-sided retro-orbital pain, nausea, and urinary frequency  Denies headaches, fevers, chills, vision changes, lightheadedness, dizziness, vomiting, chest pain, shortness of breath, abdominal pain, dysuria, diarrhea, constipation, leg pain, and leg swelling  Patient expressed adamant frustration with insufficient pain control, which has been causing him anxiety and likely contributing to elevated systolic blood pressures   Patient expressed understanding of medical plan and had no questions at conclusion of encounter   ______________________________________________________________________    Physical Exam   Constitutional: He is oriented to person, place, and time  He appears well-developed and well-nourished  He appears distressed  Holding ice pack over right eye; continues to endorse 8/10 retro-orbital pain   HENT:   Head: Normocephalic and atraumatic  Mouth/Throat: Oropharynx is clear and moist  No oropharyngeal exudate  Eyes: Pupils are equal, round, and reactive to light  Conjunctivae and EOM are normal  Right eye exhibits no discharge  Left eye exhibits no discharge  No scleral icterus  Neck: Neck supple  Cardiovascular: Normal rate, regular rhythm, normal heart sounds and intact distal pulses  Exam reveals no gallop and no friction rub  No murmur heard  Pulmonary/Chest: Effort normal and breath sounds normal  No stridor  No respiratory distress  He has no wheezes  Abdominal: Soft  Bowel sounds are normal  He exhibits no distension and no mass  There is no tenderness  Musculoskeletal: He exhibits no edema  Neurological: He is alert and oriented to person, place, and time  He displays normal reflexes  A cranial nerve deficit is present  He exhibits normal muscle tone  Coordination normal    Oriented to person, place, year, and reason for hospitalization, however unsure of day of the week or date; interval resolution of left-sided facial loss of sensation; persistent left facial droop; persistent inability to deviate tongue to the right; no dysmetria bilaterally; interval increase in left upper and lower extremity strength (now 5/5)    Skin: Skin is warm and dry  Capillary refill takes 2 to 3 seconds  No rash noted  He is not diaphoretic  No erythema  Psychiatric: He has a normal mood and affect   His behavior is normal    Currently denies suicidal and homicidal ideation    _____________________________________________________________________  Vitals:    10/16/19 0400 10/16/19 0500 10/16/19 0551 10/16/19 0600   BP: 159/86 170/91 152/85 158/92   BP Location: Left arm Left arm Left arm Left arm   Pulse: 58 58 58 68   Resp: 18 17 14 16   Temp: 98 1 °F (36 7 °C)      TempSrc: Oral      SpO2: 97% 97% 97% 98%   Weight:       Height:         Temperature:   Temp (24hrs), Av 4 °F (36 9 °C), Min:98 1 °F (36 7 °C), Max:98 7 °F (37 1 °C)    Current Temperature: 98 1 °F (36 7 °C)    Weights:   IBW: 66 1 kg    Body mass index is 24 kg/m²  Weight (last 2 days)     Date/Time   Weight    10/14/19 1651   69 5 (153 22)            Non-Invasive/Invasive Ventilation Settings:  Respiratory    Lab Data (Last 4 hours)    None         O2/Vent Data (Last 4 hours)    None              Intake and Outputs:  I/O       10/14 0701 - 10/15 0700 10/15 0701 - 10/16 0700    I V  (mL/kg) 1935 7 (27 9) 400 4 (5 8)    Total Intake(mL/kg) 1935 7 (27 9) 400 4 (5 8)    Urine (mL/kg/hr) 3000 1775 (1 1)    Emesis/NG output 1     Total Output 3001 1775    Net -1065 3 -1374 6          Unmeasured Urine Occurrence  1 x    Unmeasured Emesis Occurrence 1 x         Nutrition:        Diet Orders   (From admission, onward)             Start     Ordered    10/15/19 0910  Diet Regular; Regular House  Diet effective now     Question Answer Comment   Diet Type Regular    Regular Regular House    RD to adjust diet per protocol?  Yes        10/15/19 0910              Labs:   Results from last 7 days   Lab Units 10/16/19  0511 10/15/19  0533 10/14/19  1500   WBC Thousand/uL 6 76  --  5 20   HEMOGLOBIN g/dL 14 7 15 2 15 3   HEMATOCRIT % 42 3 42 4 43 6   PLATELETS Thousands/uL 145*  --  147*   NEUTROS PCT % 67  --   --    MONOS PCT % 8  --   --       Results from last 7 days   Lab Units 10/16/19  0511 10/15/19  0533 10/14/19  1500   POTASSIUM mmol/L 3 5 3 6 3 6   CHLORIDE mmol/L 110* 104 105   CO2 mmol/L 26 23 28   BUN mg/dL 16 9 13   CREATININE mg/dL 1 10 0 87 1 18   CALCIUM mg/dL 8 8 8 8 9 5   ALK PHOS U/L  --   --  58   ALT U/L  --   --  26   AST U/L  --   -- 19     Results from last 7 days   Lab Units 10/16/19  0511 10/15/19  0533   MAGNESIUM mg/dL 2 2 1 9     Results from last 7 days   Lab Units 10/16/19  0511 10/15/19  0533   PHOSPHORUS mg/dL 2 7 3 1      Results from last 7 days   Lab Units 10/14/19  1500   INR  1 03   PTT seconds 37         Results from last 7 days   Lab Units 10/14/19  1500   TROPONIN I ng/mL <0 03     Imaging: Awaiting interpretation of CT head without contrast and MRI brain without contrast, both performed on 10/15/2019    EK-lead ECG performed on 10/15/2019 revealed normal sinus rhythm with non-specific ST segment and T wave abnormalities     Micro:  No results found for: Ubaldo Christina, Farideh Lions, SPUTUMCULTUR    Allergies:    Allergies   Allergen Reactions    Milk-Related Compounds Throat Swelling    Other      cats    Eggs Or Egg-Derived Products     Wheat Bran      Eats it sometimes       Medications:   Scheduled Meds:    Current Facility-Administered Medications:  acetaminophen 975 mg Oral TID Loyde Dew Petit-Me    amLODIPine 5 mg Oral Daily Bear Vargas PA-C    butalbital-acetaminophen-caffeine 1 tablet Oral Q4H PRN Loyde Dew Petit-Me    fentanyl citrate (PF) 25 mcg Intravenous Q1H PRN Jose Song MD    hydrALAZINE 10 mg Intravenous Q4H PRN Jose Song MD    hydrALAZINE 5 mg Intravenous Once PRN Joes Song MD    Labetalol HCl 10 mg Intravenous Q4H PRN Jose Song MD    niCARdipine 1-15 mg/hr Intravenous Titrated Madonna Eisenmenger, DO Last Rate: Stopped (10/15/19 1119)   OLANZapine 2 5 mg Oral HS Kartik Umana MD    ondansetron 4 mg Intravenous Q4H PRN Loyde Dew Petit-Me    promethazine 25 mg Intravenous Q6H PRN Kartik Umana MD      Continuous Infusions:    niCARdipine 1-15 mg/hr Last Rate: Stopped (10/15/19 1119)     PRN Meds:    butalbital-acetaminophen-caffeine 1 tablet Q4H PRN   fentanyl citrate (PF) 25 mcg Q1H PRN   hydrALAZINE 10 mg Q4H PRN   hydrALAZINE 5 mg Once PRN   Labetalol HCl 10 mg Q4H PRN ondansetron 4 mg Q4H PRN   promethazine 25 mg Q6H PRN     VTE Pharmacologic Prophylaxis: Reason for no pharmacologic prophylaxis : intracranial hemorrhage   VTE Mechanical Prophylaxis: sequential compression device    Invasive lines and devices:   Invasive Devices     Peripheral Intravenous Line            Peripheral IV 10/14/19 Distal;Left;Ventral (anterior) Forearm 1 day    Peripheral IV 10/14/19 Left Forearm 1 day

## 2019-10-16 NOTE — PROGRESS NOTES
INTERNAL MEDICINE RESIDENCY TRANSFER ACCEPTANCE NOTE     Name: Harper White   Age & Sex: 45 y o  male   MRN: [id number]  Unit/Bed#: ICU 03   Encounter: [id number]  Hospital Stay Days: 2    Accepting team: SOD Team A  Code Status: Level 1 - Full Code  Disposition: Patient requires Level 2 Step Down     ASSESSMENT/PLAN     1) Intraparenchymal hemorrhage of brain  Repeat CT head without contrast performed the morning of 10/16/2019 revealed interval expansion of right lentiform nucleus hematoma with increase in volume from 9 4 ml to 19 3 ml with surrounding edema and mass effect on right ventricular system without herniation  Currently alert and oriented x4; GCS 15  Repeat CT head without contrast stable  · Amlodipine 10 mg p o  daily  · Hydralazine 10 mg IV q 4 p r n  For SBP >140     2) Left-sided weakness  Secondary to right-sided intraparenchymal brain hemorrhage  Marked interval improvement in neurological examination, however does exhibit residual symptoms  · Up and out of bed as tolerated  · PT/OT & PMR  · Continue to monitor for new or worsening neurological symptoms     3) Suicidal ideation   Currently denies suicidal and homicidal ideation  Was seen and evaluated by inpatient Psychiatry on 10/15/2019  · Discontinued one-to-one observation and started on Zyprexa 2 5 mg for both headaches and depression as per Psychiatry     4) Depression   Patient states that depressive symptoms are secondary to chronic headaches  Was previously prescribed Celexa approximately 3 years ago  Initially endorsed feelings of worthlessness and inability to concentrate  · Psychiatry evaluation     5) Migraine  Likely secondary to prolonged uncontrolled hypertension  Does not currently endorse headaches, however does endorse persistent right-sided retro-orbital pain  · Medrol Dosepak for eye pain  · Fioricet p o   Q 4 for headaches      VTE Pharmacologic Prophylaxis: Reason for no pharmacologic prophylaxis Intraparenchymal hemorrhage  VTE Mechanical Prophylaxis: sequential compression device    HOSPITAL COURSE     Patient is a 53-year-old male with a past medical history significant for migraine headaches, depression who initially presented to [address] [place] [address] with complaints of acute onset left-sided facial weakness and left arm weakness of 20 minutes duration  He states that he was lifting weights at the time of symptom onset  Denied loss of consciousness, fevers, headaches, chest pain, shortness of breath, nausea, vomiting, and abdominal pain  Endorses family history of hypertension in sister  Initial vital signs: HR 94, RR 16, /129, 98% O2 saturation on room air  Initial laboratory was unremarkable; PT, PTT, and INR within normal limits; negative troponin  Initial chest x-ray revealed no acute cardiopulmonary disease  CT headrevealed acute intraparenchymal hemorrhage centered at the right lentiform nucleus with mild surrounding vasogenic edema; no significant midline shift  CTA head/neck unremarkable  NIHSS score of 2; ICH score of 0  Patient was subsequently transferred to [facility] for evaluation and management of intraparenchymal brain hemorrhage  The patient was monitored in the ICU with much improvement of neurological deficits on physical exam  Repeat imaging revealed interval expansion of right lentiform nucleus hematoma with hyperacute, acute, and subacute blood products  On 10/16/2019, the patient was deemed medically stable for transfer to the medical floors  SUBJECTIVE     Patient seen and examined at bedside  No acute events overnight  Denies chest pain, SOB, diaphoresis, nausea/vomiting/diarrhea, fevers/chills       OBJECTIVE     Vitals:    10/16/19 0900 10/16/19 0916 10/16/19 0947 10/16/19 1000   BP: 147/76 147/76 154/68 139/68   BP Location:       Pulse: 66   76   Resp: (!) 11   (!) 10   Temp:       TempSrc:       SpO2: 98%   98%   Weight:       Height:         I/O last 24 hours: In: 760 4 [P O :360; I V :400 4]  Out: 2075 [Urine:2075]    Physical Exam   Constitutional: He is oriented to person, place, and time  He appears well-developed and well-nourished  No distress  HENT:   Head: Normocephalic and atraumatic  Nose: Nose normal    Mouth/Throat: No oropharyngeal exudate  Eyes: Conjunctivae are normal  Right eye exhibits no discharge  Left eye exhibits no discharge  No scleral icterus  Neck: Neck supple  No JVD present  Cardiovascular: Normal rate, regular rhythm, normal heart sounds and intact distal pulses  Exam reveals no gallop and no friction rub  No murmur heard  Pulmonary/Chest: Effort normal and breath sounds normal  No respiratory distress  He has no wheezes  He has no rales  Abdominal: Soft  Bowel sounds are normal  He exhibits no distension  There is no tenderness  There is no rebound and no guarding  Musculoskeletal: Normal range of motion  He exhibits no edema  Neurological: He is alert and oriented to person, place, and time  L-sided facial droop  L visual field loss   Skin: Skin is warm and dry  He is not diaphoretic  No erythema  Psychiatric: He has a normal mood and affect  LABORATORY DATA     Labs: I have personally reviewed pertinent reports      Results from last 7 days   Lab Units 10/16/19  0511 10/15/19  0533 10/14/19  1500   WBC Thousand/uL 6 76  --  5 20   HEMOGLOBIN g/dL 14 7 15 2 15 3   HEMATOCRIT % 42 3 42 4 43 6   PLATELETS Thousands/uL 145*  --  147*   NEUTROS PCT % 67  --   --    MONOS PCT % 8  --   --     Results from last 7 days   Lab Units 10/16/19  0511 10/15/19  0533 10/14/19  1500   POTASSIUM mmol/L 3 5 3 6 3 6   CHLORIDE mmol/L 110* 104 105   CO2 mmol/L 26 23 28   BUN mg/dL 16 9 13   CREATININE mg/dL 1 10 0 87 1 18   CALCIUM mg/dL 8 8 8 8 9 5   ALK PHOS U/L  --   --  58   ALT U/L  --   --  26   AST U/L  --   --  19     Results from last 7 days   Lab Units 10/16/19  0511 10/15/19  0533   MAGNESIUM mg/dL 2 2 1 9 Results from last 7 days   Lab Units 10/16/19  0511 10/15/19  0533   PHOSPHORUS mg/dL 2 7 3 1      Results from last 7 days   Lab Units 10/14/19  1500   INR  1 03   PTT seconds 37         Results from last 7 days   Lab Units 10/14/19  1500   TROPONIN I ng/mL <0 03     Micro:  No results found for: BLOODCX, Carlus Eth, [address] TESTING     Imaging: I have personally reviewed pertinent reports  X-ray Chest 1 View Portable    Result Date: 10/14/2019  Impression: No acute cardiopulmonary disease  Workstation performed: [id number]     Ct Head Wo Contrast    Result Date: 10/16/2019  Impression: Interval expansion of the right lentiform nucleus hematoma with increase in volume from 9 4 mL 219 3 mL  There is slightly greater surrounding edema and mass effect on the right ventricular system and MCA cistern without herniation  Short-term follow-up evaluation is recommended to assess for stability  I personally discussed this study with Dr Cynthia Arzate on 10/16/2019 at 8:29 AM  Workstation performed: [id number]     Mri Brain Wo Contrast    Result Date: 10/16/2019  Impression: Interval expansion of the right lentiform nucleus hematoma with hyperacute, acute and subacute blood products  The hematoma cavity demonstrates interval expansion when compared to the recent CT scan, now measuring 22 mL  Presumed etiology is hypertension however, correlation for underlying coagulopathy is recommended  There is mild surrounding edema and mass effect on the right ventricular system with asymmetric dilatation of the right temporal horn which may represent early entrapment  Follow-up CT evaluation is recommended  I personally discussed this study with Dr Cynthia Arzate on 10/16/2019 at 8:13 AM  Workstation performed: [id number]     Ct Stroke Alert Brain    Result Date: 10/14/2019  Impression: Acute intraparenchymal hemorrhage centered at the right lentiform nucleus with mild surrounding vasogenic edema  No significant mass effect or midline shift  Findings were directly discussed with GRAHAM NAVARRO on 10/14/2019 2:59 PM  Workstation performed: [id number]     Cta Stroke Alert (head/neck)    Result Date: 10/14/2019  Impression: 1  Unremarkable CT angiogram of the brain  No CTA spot sign  2   Unremarkable CT angiogram of the neck  Findings were directly discussed with Dr Rodney Hyatt on 10/14/2019 3:20 PM  Workstation performed: [id number]     EKG, Pathology, and Other Studies: I have personally reviewed pertinent reports  ALLERGIES     Allergies   Allergen Reactions    Milk-Related Compounds Throat Swelling    Other      cats    Eggs Or Egg-Derived Products     Wheat Bran      Eats it sometimes       MEDICATIONS     Current Facility-Administered Medications:  [START ON 10/17/2019] amLODIPine 10 mg Oral Daily Eddy Dockery MD   butalbital-acetaminophen-caffeine 1 tablet Oral Q4H PRN Ines Taveras   hydrALAZINE 10 mg Intravenous Q4H PRN Blas Bonner MD   methylPREDNISolone 24 mg Oral Daily Eddy Dockery MD   Followed by       Jame Eli ON 10/17/2019] methylPREDNISolone 20 mg Oral Daily Eddy Dockery MD   Followed by       Jame Eli ON 10/18/2019] methylPREDNISolone 16 mg Oral Daily Eddy Dockery MD   Followed by       Jame Eli ON 10/19/2019] methylPREDNISolone 12 mg Oral Daily Eddy Dockery MD   Followed by       Jame Eli ON 10/20/2019] methylPREDNISolone 8 mg Oral Daily Eddy Dockery MD   Followed by       Jame Eli ON 10/21/2019] methylPREDNISolone 4 mg Oral Daily Eddy Dockery MD   OLANZapine 2 5 mg Oral HS Pastora Campa MD   potassium chloride 20 mEq Oral Once MD amelia Noealbital-acetaminophen-caffeine 1 tablet Q4H PRN   hydrALAZINE 10 mg Q4H PRN       Portions of the record may have been created with voice recognition software  Occasional wrong word or "sound a like" substitutions may have occurred due to the inherent limitations of voice recognition software    Read the chart carefully and recognize, using context, where substitutions have occurred     ==  Sathya Mclean, [facility]  Internal Medicine Residency PGY-2

## 2019-10-16 NOTE — SOCIAL WORK
PT/OT recommending STR at d/c  CM met with pt, his mother Lauren Saldivar, and his aunt Riaz Urena, at bedside  CM informed pt and family of recommendation  All agreeable to dcp to STR  CM discussed acute rehabs  CM provided list for them to review  A post acute care recommendation was made by your care team for Acute Rehab  Discussed [place] of Choice with patient  List of facilities given to patient via in person  patient aware the list is custom filtered for them by zip code location and that [facility] post acute providers are designated

## 2019-10-16 NOTE — PROGRESS NOTES
Progress Note - [facility] 1975, 45 y o  male MRN: [id number]    Unit/Bed#: ICU 03 Encounter: [id number]    Primary Care Provider: No primary care provider on file  Date and time admitted to hospital: 10/14/2019  4:46 PM        Suicidal ideation  Assessment & Plan  Psych consulted - appreciate recommendations  Left-sided weakness  Assessment & Plan  See above  * Intraparenchymal hemorrhage of brain [facility])  Assessment & Plan  · Right IPH, ICH 0  Presented 10/14 to [address] [facility] ED with left side weakness, "not feeling right" s/p lifting heavy weights in gym  · BP on presentation was 208/129  Denies history of hypertension, states he was told in 2012 he had "white coat syndrome "  · Exam significantly improved since yesterday, now with strength 3-4/5 to left upper and lower extremities  Continues to have left facial droop, left pronator drift  · Imaging reviewed by myself and attending as follows:  · CT head 10/15/19:  Interval expansion of the right lentiform nucleus hematoma with increase in volume from 9 4 mL to 19 3 mL  There is slightly greater surrounding edema and mass effect on the right ventricular system and MCA cistern without herniation  · MRI brain 10/15/19:  Interval expansion of the right lentiform nucleus hematoma with hyperacute, acute and subacute blood products  The hematoma cavity demonstrates interval expansion when compared to the recent CT scan, now measuring 22 mL  There is mild surrounding edema and mass effect on the right ventricular system with asymmetric dilatation of the right temporal horn which may represent early entrapment  · CT head 10/14/19: acute intraparenchymal hemorrhage centered at the right lentiform nucleus with mild surrounding vasogenic edema  No significant mass effect or midline shift  · CTA 10/14/19: unremarkable  Examined with neuroradiologist and noted with right vertebral artery aneurysm  · Repeat CT for stability today    · STAT CT head if decrease in GCS > 2 points in 1 hour  · In light of right vertebral artery aneurysm, would recommend long term ASA therapy in approximately one week if CT head is stable  Repeat CTA in one week  · Cardene gtt d/c'd  Started on Norvasc  Recommend SBP < 160 mmHg  · Psych consulted for history of depression - recommend zyprexa 2 5 mg qhs  · Neurosurgery will continue to follow  Please call with any questions or concerns  Subjective/Objective   Chief Complaint: "I'm feeling better "    Subjective: Patient denies headache, states he is feeling much better  Still having some difficulty with left visual field but states now he can see out of it  Objective: Strength to left side improved, now with 4/5 to LLE, 3-4/5 LUE  Left side pronator drift  Left facial droop  Some left visual field loss but much improved from yesterday  I/O       10/14 0701 - 10/15 0700 10/15 0701 - 10/16 0700 10/16 0701 - 10/17 0700    P  O    360    I V  (mL/kg) 1935 7 (27 9) 400 4 (5 8)     Total Intake(mL/kg) 1935 7 (27 9) 400 4 (5 8) 360 (5 2)    Urine (mL/kg/hr) 3000 1775 (1 1) 300 (1 1)    Emesis/NG output 1      Total Output 3001 1775 300    Net -1065 3 -1374 6 +60           Unmeasured Urine Occurrence  1 x     Unmeasured Emesis Occurrence 1 x            Invasive Devices     Peripheral Intravenous Line            Peripheral IV 10/14/19 Distal;Left;Ventral (anterior) Forearm 1 day    Peripheral IV 10/14/19 Left Forearm 1 day                Physical Exam:  Vitals: Blood pressure 139/68, pulse 76, temperature 98 5 °F (36 9 °C), temperature source Oral, resp  rate (!) 10, height 5' 7" (1 702 m), weight 69 5 kg (153 lb 3 5 oz), SpO2 98 %  ,Body mass index is 24 kg/m²        General appearance: alert, appears stated age, cooperative and no distress  Head: Normocephalic, without obvious abnormality, atraumatic  Eyes: EOMI, PERRL, pupils 3 mm bilaterally  Neck: supple, symmetrical, trachea midline and NT  Back: no kyphosis present, no tenderness to percussion or palpation  Lungs: non labored breathing  Heart: regular heart rate  Neurologic:   Mental status: Alert, oriented x2-3 (thought it was 10/13), thought content appropriate (making appropriate jokes)  Cranial nerves: grossly intact (Cranial nerves II-XII), left facial droop  Sensory: normal to light touch, JPS, DST  Motor: moving all extremities, LUE strength 3-4/5, LLE 4/5  Reflexes: 2+ and symmetric, no [place]'s or clonus  Coordination: finger to nose with very mild ataxia, left pronator drift      Lab Results:  Results from last 7 days   Lab Units 10/16/19  0511 10/15/19  0533 10/14/19  1500   WBC Thousand/uL 6 76  --  5 20   HEMOGLOBIN g/dL 14 7 15 2 15 3   HEMATOCRIT % 42 3 42 4 43 6   PLATELETS Thousands/uL 145*  --  147*   NEUTROS PCT % 67  --   --    MONOS PCT % 8  --   --      Results from last 7 days   Lab Units 10/16/19  0511 10/15/19  0533 10/14/19  1500   POTASSIUM mmol/L 3 5 3 6 3 6   CHLORIDE mmol/L 110* 104 105   CO2 mmol/L 26 23 28   BUN mg/dL 16 9 13   CREATININE mg/dL 1 10 0 87 1 18   CALCIUM mg/dL 8 8 8 8 9 5   ALK PHOS U/L  --   --  58   ALT U/L  --   --  26   AST U/L  --   --  19     Results from last 7 days   Lab Units 10/16/19  0511 10/15/19  0533   MAGNESIUM mg/dL 2 2 1 9     Results from last 7 days   Lab Units 10/16/19  0511 10/15/19  0533   PHOSPHORUS mg/dL 2 7 3 1     Results from last 7 days   Lab Units 10/14/19  1500   INR  1 03   PTT seconds 37     No results found for: TROPONINT  ABG:No results found for: PHART, KZG9PIA, PO2ART, RLR3YDC, P7ZUFGOB, BEART, SOURCE    Imaging Studies: I have personally reviewed pertinent reports  and I have personally reviewed pertinent films in PACS    EKG, Pathology, and Other Studies: I have personally reviewed pertinent reports        VTE Pharmacologic Prophylaxis: Sequential compression device (Venodyne)     VTE Mechanical Prophylaxis: sequential compression device

## 2019-10-16 NOTE — ASSESSMENT & PLAN NOTE
· Right IPH, ICH 0  Presented 10/14 to [facility] ED with left side weakness, "not feeling right" s/p lifting heavy weights in gym  · BP on presentation was 208/129  Denies history of hypertension, states he was told in 2012 he had "white coat syndrome "  · Exam significantly improved since yesterday, now with strength 3-4/5 to left upper and lower extremities  Continues to have left facial droop, left pronator drift  · Imaging reviewed by myself and attending as follows:  · CT head 10/15/19:  Interval expansion of the right lentiform nucleus hematoma with increase in volume from 9 4 mL to 19 3 mL  There is slightly greater surrounding edema and mass effect on the right ventricular system and MCA cistern without herniation  · MRI brain 10/15/19:  Interval expansion of the right lentiform nucleus hematoma with hyperacute, acute and subacute blood products  The hematoma cavity demonstrates interval expansion when compared to the recent CT scan, now measuring 22 mL  There is mild surrounding edema and mass effect on the right ventricular system with asymmetric dilatation of the right temporal horn which may represent early entrapment  · CT head 10/14/19: acute intraparenchymal hemorrhage centered at the right lentiform nucleus with mild surrounding vasogenic edema  No significant mass effect or midline shift  · CTA 10/14/19: unremarkable  Examined with neuroradiologist and noted with right vertebral artery aneurysm  · Repeat CT for stability today  · STAT CT head if decrease in GCS > 2 points in 1 hour  · In light of right vertebral artery aneurysm, would recommend long term ASA therapy in approximately one week if CT head is stable  Repeat CTA in one week  · Cardene gtt d/c'd  Started on Norvasc  Recommend SBP < 160 mmHg  · Psych consulted for history of depression - recommend zyprexa 2 5 mg qhs  · Neurosurgery will continue to follow  Please call with any questions or concerns

## 2019-10-16 NOTE — PROGRESS NOTES
IMR Unit Transfer Note  Unit/Bed # @DBLINK (MILI,81117)@ Encounter: [id number]  SOD Team A    Jessica Fajardo 45 y o  male [id number]    C/Donna Pierce 1153 Problems: Principal Problem:    Intraparenchymal hemorrhage of brain [facility])  Active Problems:    Left-sided weakness    Suicidal ideation    Depression    Migraine    Assessment and Plan    1) Intraparenchymal hemorrhage of brain  - Repeat CT head without contrast performed the morning of 10/16/2019 revealed interval expansion of right lentiform nucleus hematoma with increase in volume from 9 4 ml to 19 3 ml with surrounding edema and mass effect on right ventricular system without herniation  - Currently alert and oriented x4; GCS 15  - Repeat CT head without contrast stable  - Amlodipine 10 mg p o  daily  - Hydralazine 10 mg IV q 4 p r n   For SBP >140    2) Left-sided weakness  - Secondary to right-sided intraparenchymal brain hemorrhage  - Marked interval improvement in neurological examination, however does exhibit residual symptoms (see physical examination)  - Up and out of bed as tolerated  - PT/OT and physical medicine consulted  - Continue to monitor for new or worsening neurological symptoms    3) Suicidal ideation   - Currently denies suicidal and homicidal ideation  - Was seen and evaluated by inpatient Psychiatry on 10/15/2019  - Discontinued one-to-one observation and started on Zyprexa 2 5 mg for both headaches and depression as per Psychiatry    4) Depression   - Patient states that depressive symptoms are secondary to chronic headaches  - Was previously prescribed Celexa approximately 3 years ago  - Initially endorsed feelings of worthlessness and inability to concentrate  -  Psychiatric evaluation and recommendation as stated above    5) Migraine  -  Likely secondary to prolonged uncontrolled hypertension  - Does not currently endorse headaches, however does endorse persistent right-sided retro-orbital pain  - Medrol Dosepak for eye pain  - Fioricet p o  Q 4 for headaches    VTE Pharmacologic Prophylaxis: Reason for no pharmacologic prophylaxis : intracranial hemorrhage   VTE Mechanical Prophylaxis: sequential compression device    Disposition: Patient requires Level 2 Step Down     Hospital Course:     Alphonzo Simmonds is a 53-year-old male with a past medical history of hypertension, migraine headaches, and depression who presented as a transfer from [facility] the evening of 10/14/2019 for evaluation and treatment of right-sided intracranial hemorrhage (NIHSS 9, ICH 0)  Upon arrival, patient was alert and oriented x4 and saturating well on room air  Endorsed right eye pain and drowsiness; denied headaches, fevers, chills, vision changes, lightheadedness, dizziness, chest pain, and shortness of breath  Physical examination was significant for bilateral left visual field loss, complete loss of sensation over left face; left facial droop, inability to deviate tongue towards right side, flaccid paralysis of left upper extremity with complete loss of sensation, 3/5 left lower extremity extremity strength with complete loss of sensation, and left patellar hyperreflexia  Patient was subsequently started on nicardipine drip with Q1 neuro checks  Upon chart review, patient was also noted to have experienced suicidal ideation over the last three months, prompting inpatient psychiatry consultation and continuous 1-to-1 observation (seen and evaluate; 1-to-1 discontinued and started on zyprexa for headaches and depression)  Over the next two days patient exhibited progressive clinical improvement with nearly full recovery of strength in left upper and lower extremity, however repeat imaging indicated that initial hematoma had undergone interval increase in size  Patient was deemed stable for transfer out of the critical care unit the morning of 10/16/2019, however unit transfer was postponed until the afternoon pending results of repeat CT head       Subjective:     No telemetry events overnight  No acute events overnight, however did require three PRN doses of hydralazine overnight for systolic blood pressures >853  Additionally received QHS dose of zyprexa and subsequently slept through the night  24-hour repeat CT head without contrast demonstrates apparent slight interval hemorrhagic enlargement, however patient demonstrates interval clinical improvement; awaiting official interpretation of both repeat CT head and MRI brain  Lying supine in bed upon entry with ice pack pressed over right eye  Endorses persistent right-sided retro-orbital pain, nausea, and urinary frequency  Denies headaches, fevers, chills, vision changes, lightheadedness, dizziness, vomiting, chest pain, shortness of breath, abdominal pain, dysuria, diarrhea, constipation, leg pain, and leg swelling  Patient expressed adamant frustration with insufficient pain control, which has been causing him anxiety and likely contributing to elevated systolic blood pressures  Patient expressed understanding of medical plan and had no questions at conclusion of encounter  Objective:   Vitals:    10/16/19 0900 10/16/19 0916 10/16/19 0947 10/16/19 1000   BP: 147/76 147/76 154/68 139/68   BP Location:       Pulse: 66   76   Resp: (!) 11   (!) 10   Temp:       TempSrc:       SpO2: 98%   98%   Weight:       Height:         I/O last 24 hours: In: 760 4 [P O :360; I V :400 4]  Out: 2075 [Urine:2075]    Physical examination performed the morning of 10/16/2019:    Constitutional: He is oriented to person, place, and time  He appears well-developed and well-nourished  He appears distressed  Holding ice pack over right eye; continues to endorse 8/10 retro-orbital pain   HENT:   Head: Normocephalic and atraumatic  Mouth/Throat: Oropharynx is clear and moist  No oropharyngeal exudate  Eyes: Pupils are equal, round, and reactive to light  Conjunctivae and EOM are normal  Right eye exhibits no discharge   Left eye exhibits no discharge  No scleral icterus  Neck: Neck supple  Cardiovascular: Normal rate, regular rhythm, normal heart sounds and intact distal pulses  Exam reveals no gallop and no friction rub  No murmur heard  Pulmonary/Chest: Effort normal and breath sounds normal  No stridor  No respiratory distress  He has no wheezes  Abdominal: Soft  Bowel sounds are normal  He exhibits no distension and no mass  There is no tenderness  Musculoskeletal: He exhibits no edema  Neurological: He is alert and oriented to person, place, and time  He displays normal reflexes  A cranial nerve deficit is present  He exhibits normal muscle tone  Coordination normal    Oriented to person, place, year, and reason for hospitalization, however unsure of day of the week or date; interval resolution of left-sided facial loss of sensation; persistent left facial droop; persistent inability to deviate tongue to the right; no dysmetria bilaterally; interval increase in left upper and lower extremity strength (now 5/5)    Skin: Skin is warm and dry  Capillary refill takes 2 to 3 seconds  No rash noted  He is not diaphoretic  No erythema  Psychiatric: He has a normal mood and affect   His behavior is normal    Currently denies suicidal and homicidal ideation      Leslee Caba MD

## 2019-10-16 NOTE — PLAN OF CARE
Problem: Prexisting or High Potential for Compromised Skin Integrity  Goal: Skin integrity is maintained or improved  Description  INTERVENTIONS:  - Identify patients at risk for skin breakdown  - Assess and monitor skin integrity  - Assess and monitor nutrition and hydration status  - Monitor labs   - Assess for incontinence   - Turn and reposition patient  - Assist with mobility/ambulation  - Relieve pressure over bony prominences  - Avoid friction and shearing  - Provide appropriate hygiene as needed including keeping skin clean and dry  - Evaluate need for skin moisturizer/barrier cream  - Collaborate with interdisciplinary team   - Patient/family teaching  - Consider wound care consult   Outcome: Progressing     Problem: Potential for Falls  Goal: Patient will remain free of falls  Description  INTERVENTIONS:  - Assess patient frequently for physical needs  -  Identify cognitive and physical deficits and behaviors that affect risk of falls  -  [place] fall precautions as indicated by assessment   - Educate patient/family on patient safety including physical limitations  - Instruct patient to call for assistance with activity based on assessment  - Modify environment to reduce risk of injury  - Consider OT/PT consult to assist with strengthening/mobility  Outcome: Progressing     Problem: Neurological Deficit  Goal: Neurological status is stable or improving  Description  Interventions:  - Monitor and assess patient's level of consciousness, motor function, sensory function, and level of assistance needed for ADLs  - Monitor and report changes from baseline  Collaborate with interdisciplinary team to initiate plan and implement interventions as ordered  - Provide and maintain a safe environment  - Consider seizure precautions  - Consider fall precautions  - Consider aspiration precautions  - Consider bleeding precautions  Outcome: Progressing     Problem:  Activity Intolerance/Impaired Mobility  Goal: Mobility/activity is maintained at optimum level for patient  Description  Interventions:  - Assess and monitor patient  barriers to mobility and need for assistive/adaptive devices  - Assess patient's emotional response to limitations  - Collaborate with interdisciplinary team and initiate plans and interventions as ordered  - Encourage independent activity per ability   - Maintain proper body alignment  - Perform active/passive rom as tolerated/ordered  - Plan activities to conserve energy   - Turn patient as appropriate  Outcome: Progressing     Problem: Communication Impairment  Goal: Ability to express needs and understand communication  Description  Assess patient's communication skills and ability to understand information  Patient will demonstrate use of effective communication techniques, alternative methods of communication and understanding even if not able to speak  - Encourage communication and provide alternate methods of communication as needed  - Collaborate with case management/ for discharge needs  - Include patient/family/caregiver in decisions related to communication  Outcome: Progressing     Problem: Potential for Aspiration  Goal: Non-ventilated patient's risk of aspiration is minimized  Description  Assess and monitor vital signs, respiratory status, and labs (WBC)  Monitor for signs of aspiration (tachypnea, cough, rales, wheezing, cyanosis, fever)  - Assess and monitor patient's ability to swallow  - Place patient up in chair to eat if possible  - HOB up at 90 degrees to eat if unable to get patient up into chair   - Supervise patient during oral intake  - Instruct patient/ family to take small bites  - Instruct patient/ family to take small single sips when taking liquids    - Follow patient-specific strategies generated by speech pathologist   Outcome: Progressing     Problem: Nutrition  Goal: Nutrition/Hydration status is improving  Description  Monitor and assess patient's nutrition/hydration status for malnutrition (ex- brittle hair, bruises, dry skin, pale skin and conjunctiva, muscle wasting, smooth red tongue, and disorientation)  Collaborate with interdisciplinary team and initiate plan and interventions as ordered  Monitor patient's weight and dietary intake as ordered or per policy  Utilize nutrition screening tool and intervene per policy  Determine patient's food preferences and provide high-protein, high-caloric foods as appropriate  - Assist patient with eating   - Allow adequate time for meals   - Encourage patient to take dietary supplement as ordered  - Collaborate with clinical nutritionist   - Include patient/family/caregiver in decisions related to nutrition    Outcome: Progressing     Problem: NEUROSENSORY - ADULT  Goal: Achieves stable or improved neurological status  Description  INTERVENTIONS  - Monitor and report changes in neurological status  - Monitor vital signs such as temperature, blood pressure, glucose, and any other labs ordered   - Initiate measures to prevent increased intracranial pressure  - Monitor for seizure activity and implement precautions if appropriate      Outcome: Progressing  Goal: Remains free of injury related to seizures activity  Description  INTERVENTIONS  - Maintain airway, patient safety  and administer oxygen as ordered  - Monitor patient for seizure activity, document and report duration and description of seizure to physician/advanced practitioner  - If seizure occurs,  ensure patient safety during seizure  - Reorient patient post seizure  - Seizure pads on all 4 side rails  - Instruct patient/family to notify RN of any seizure activity including if an aura is experienced  - Instruct patient/family to call for assistance with activity based on nursing assessment  - Administer anti-seizure medications if ordered    Outcome: Progressing  Goal: Achieves maximal functionality and self care  Description  INTERVENTIONS  - Monitor swallowing and airway patency with patient fatigue and changes in neurological status  - Encourage and assist patient to increase activity and self care     - Encourage visually impaired, hearing impaired and aphasic patients to use assistive/communication devices  Outcome: Progressing     Problem: CARDIOVASCULAR - ADULT  Goal: Maintains optimal cardiac output and hemodynamic stability  Description  INTERVENTIONS:  - Monitor I/O, vital signs and rhythm  - Monitor for S/S and trends of decreased cardiac output  - Administer and titrate ordered vasoactive medications to optimize hemodynamic stability  - Assess quality of pulses, skin color and temperature  - Assess for signs of decreased coronary artery perfusion  - Instruct patient to report change in severity of symptoms  Outcome: Progressing  Goal: Absence of cardiac dysrhythmias or at baseline rhythm  Description  INTERVENTIONS:  - Continuous cardiac monitoring, vital signs, obtain 12 lead EKG if ordered  - Administer antiarrhythmic and heart rate control medications as ordered  - Monitor electrolytes and administer replacement therapy as ordered  Outcome: Progressing     Problem: COPING  Goal: Pt/Family able to verbalize concerns and demonstrate effective coping strategies  Description  INTERVENTIONS:  - Assist patient/family to identify coping skills, available support systems and cultural and spiritual values  - Provide emotional support, including active listening and acknowledgement of concerns of patient and caregivers  - Reduce environmental stimuli, as able  - Provide patient education  - Assess for spiritual pain/suffering and initiate spiritual care, including notification of Pastoral Care or angelia based community as needed  - Assess effectiveness of coping strategies  Outcome: Progressing  Goal: Will report anxiety at manageable levels  Description  INTERVENTIONS:  - Administer medication as ordered  - Teach and encourage coping skills  - Provide emotional support  - Assess patient/family for anxiety and ability to cope  Outcome: Progressing     Problem: CONFUSION/THOUGHT DISTURBANCE  Goal: Thought disturbances (confusion, delirium, depression, dementia or psychosis) are managed to maintain or return to baseline mental status and functional level  Description  INTERVENTIONS:  - Assess for possible contributors to  thought disturbance, including but not limited to medications, infection, impaired vision or hearing, underlying metabolic abnormalities, dehydration, respiratory compromise,  psychiatric diagnoses and notify attending PHYSICAN/AP  - Monitor and intervene to maintain adequate nutrition, hydration, elimination, sleep and activity  - Decrease environmental stimuli, including noise as appropriate  - Provide frequent contacts to provide refocusing, direction and reassurance as needed  Approach patient calmly with eye contact and at their level    - [place] high risk fall precautions, aspiration precautions and other safety measures, as indicated  - If delirium suspected, notify physician/AP of change in condition and request immediate in-person evaluation  - Pursue consults as appropriate including Geriatric (campus dependent), OT for cognitive evaluation/activity planning, psychiatric, pastoral care, etc   Outcome: Progressing

## 2019-10-16 NOTE — PROGRESS NOTES
Progress Note - Behavioral Health   Carlene Patel 45 y o  male MRN: [id number]  Unit/Bed#: [facility] 722-01 Encounter: [id number]        I came to see the patient continuation of care, the patient today feels better, he is able to move his arms , he does not have any headache or any pain behind his eye and he is looking in brighter future  He talked about his stressors also he took about the good support that he has from his family  He is also looking forward to go to rehab to get better  Behavior over the last 24 hours:  improved  Sleep: normal  Appetite: normal  Medication side effects: No  ROS: no complaints    Mental Status Evaluation:  Appearance:  age appropriate   Behavior:  normal   Speech:  normal pitch and normal volume   Mood:  anxious   Affect:  mood-congruent   Language: naming objects and repeating phrases   Thought Process:  goal directed   Associations: intact associations   Thought Content:  normal   Perceptual Disturbances: None   Risk Potential: He denies any active suicidal thoughts plans or intent   Sensorium:  person, place, time/date and situation   Memory:  recent and remote memory grossly intact   Cognition:  grossly intact   Consciousness:  alert and awake    Attention: attention span and concentration were age appropriate   Intellect: within normal limits   Fund of Knowledge: awareness of current events: Fair, past history: Fair and vocabulary: Fair   Insight:  good   Judgment: good   Muscle Strength and Tone: Within normal limits   Gait/Station: Unable to assess patient was in bed   Motor Activity: no abnormal movements         Assessment/Plan  Carlene Patel is a 45 y o  male with a history of depression, migraine hypertension presented to the hospital for evaluation of acute onset left side weakness as left and weakness  He has intraparenchymal brain hemorrhage    He was evaluated for suicidal ideation, he states that he feels better his mood is more euthymic, last night he was able to sleep good, today he does not have any pain, he is looking forward to his recovery  He denies any suicidal ideation plan or intent, he denies any psychotic symptoms history manic episodes  He denies any side effects of the Zyprexa  Diagnosis:  Major 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 psychotic features    Recommended Treatment:   Continue medical management  Continue Zyprexa 2 5 mg p o  HS  Discussed with primary team  I will follow up        Medications:   current meds:   Current Facility-Administered Medications   Medication Dose Route Frequency    [START ON 10/17/2019] amLODIPine (NORVASC) tablet 10 mg  10 mg Oral Daily    butalbital-acetaminophen-caffeine (FIORICET,ESGIC) -40 mg per tablet 1 tablet  1 tablet Oral Q4H PRN    hydrALAZINE (APRESOLINE) injection 10 mg  10 mg Intravenous Q4H PRN    [START ON 10/17/2019] methylprednisolone (MEDROL) tablet 20 mg  20 mg Oral Daily    Followed by   Akshat Bake ON 10/18/2019] methylprednisolone (MEDROL) tablet 16 mg  16 mg Oral Daily    Followed by   Akshat Bake ON 10/19/2019] methylprednisolone (MEDROL) tablet 12 mg  12 mg Oral Daily    Followed by   Akshat Bake ON 10/20/2019] methylprednisolone (MEDROL) tablet 8 mg  8 mg Oral Daily    Followed by   Akshat Bake ON 10/21/2019] methylprednisolone (MEDROL) tablet 4 mg  4 mg Oral Daily    OLANZapine (ZyPREXA) tablet 2 5 mg  2 5 mg Oral HS         Risks, benefits and possible side effects of Medications:     Risks, benefits, and possible side effects of medications explained to patient and patient verbalizes understanding  Labs: I have personally reviewed all pertinent laboratory results  I have personally reviewed all pertinent laboratory/tests results    Labs in last 72 hours:   Recent Labs     10/14/19  1500 10/15/19  0533 10/16/19  0511   WBC 5 20  --  6 76   RBC 4 90  --  4 78   HGB 15 3 15 2 14 7   HCT 43 6 42 4 42 3   *  --  145*   RDW 13 7  --  13 2   NEUTROABS  --   --  4 50   SODIUM 141 137 143   K 3 6 3 6 3 5    104 110*   CO2 28 23 26   BUN 13 9 16   CREATININE 1 18 0 87 1 10   GLUC 101* 107 84   CALCIUM 9 5 8 8 8 8   AST 19  --   --    ALT 26  --   --    ALKPHOS 58  --   --    TP 6 9  --   --    ALB 4 7  --   --    TBILI 0 60  --   --    CHOLESTEROL  --  126  --    HDL  --  49  --    TRIG  --  31  --    LDLCALC  --  71  --            Peri Main MD

## 2019-10-16 NOTE — SOCIAL WORK
CM met with pt at bedside and introduced self/role with dcp  Pt reports he resides with his nephew and nephews g/f in a first floor home with 1 VIET  Pt reports also having a basement where he works out but does not plan to use it for some time  Pt independent with ADLs and ambulation PTA  Pt reports she is able to drive  Unemployed  Pt reports no hx of VNA, STR, or drug/alcohol abuse  Pt reports depression/anxiety - not currently managed on medication or by a psychiatrist  Pt denies hx of IP MH tx  Pt reports no established pharmacy  Pt denies POA/LW  CM to follow for dcp  CM reviewed d/c planning process including the following: identifying help at home, patient preference for d/c planning needs, Discharge Lounge, [facility] Meds to Bed program, availability of treatment team to discuss questions or concerns patient and/or family may have regarding understanding medications and recognizing signs and symptoms once discharged  CM also encouraged patient to follow up with all recommended appointments after discharge  Patient advised of importance for patient and family to participate in managing patients medical well being

## 2019-10-17 PROBLEM — I72.6 VERTEBRAL ARTERY ANEURYSM (HCC): Status: ACTIVE | Noted: 2019-10-17

## 2019-10-17 PROCEDURE — 97116 GAIT TRAINING THERAPY: CPT

## 2019-10-17 PROCEDURE — 99232 SBSQ HOSP IP/OBS MODERATE 35: CPT | Performed by: INTERNAL MEDICINE

## 2019-10-17 PROCEDURE — 99233 SBSQ HOSP IP/OBS HIGH 50: CPT | Performed by: NURSE PRACTITIONER

## 2019-10-17 PROCEDURE — 99232 SBSQ HOSP IP/OBS MODERATE 35: CPT | Performed by: PSYCHIATRY & NEUROLOGY

## 2019-10-17 PROCEDURE — 97110 THERAPEUTIC EXERCISES: CPT

## 2019-10-17 PROCEDURE — G0515 COGNITIVE SKILLS DEVELOPMENT: HCPCS

## 2019-10-17 PROCEDURE — 97530 THERAPEUTIC ACTIVITIES: CPT

## 2019-10-17 PROCEDURE — 97535 SELF CARE MNGMENT TRAINING: CPT

## 2019-10-17 PROCEDURE — 92526 ORAL FUNCTION THERAPY: CPT

## 2019-10-17 RX ORDER — LISINOPRIL 10 MG/1
10 TABLET ORAL DAILY
Status: DISCONTINUED | OUTPATIENT
Start: 2019-10-17 | End: 2019-10-19

## 2019-10-17 RX ORDER — METOCLOPRAMIDE HYDROCHLORIDE 5 MG/ML
10 INJECTION INTRAMUSCULAR; INTRAVENOUS ONCE
Status: COMPLETED | OUTPATIENT
Start: 2019-10-18 | End: 2019-10-18

## 2019-10-17 RX ORDER — DIPHENHYDRAMINE HYDROCHLORIDE 50 MG/ML
25 INJECTION INTRAMUSCULAR; INTRAVENOUS ONCE
Status: COMPLETED | OUTPATIENT
Start: 2019-10-18 | End: 2019-10-18

## 2019-10-17 RX ORDER — DIPHENHYDRAMINE HYDROCHLORIDE 50 MG/ML
12.5 INJECTION INTRAMUSCULAR; INTRAVENOUS
Status: COMPLETED | OUTPATIENT
Start: 2019-10-17 | End: 2019-10-17

## 2019-10-17 RX ORDER — METOCLOPRAMIDE HYDROCHLORIDE 5 MG/ML
10 INJECTION INTRAMUSCULAR; INTRAVENOUS ONCE
Status: COMPLETED | OUTPATIENT
Start: 2019-10-17 | End: 2019-10-17

## 2019-10-17 RX ORDER — METOCLOPRAMIDE HYDROCHLORIDE 5 MG/ML
10 INJECTION INTRAMUSCULAR; INTRAVENOUS ONCE
Status: CANCELLED | OUTPATIENT
Start: 2019-10-17

## 2019-10-17 RX ORDER — DIPHENHYDRAMINE HYDROCHLORIDE 50 MG/ML
25 INJECTION INTRAMUSCULAR; INTRAVENOUS ONCE
Status: CANCELLED | OUTPATIENT
Start: 2019-10-17

## 2019-10-17 RX ORDER — NORTRIPTYLINE HYDROCHLORIDE 10 MG/1
10 CAPSULE ORAL
Status: DISCONTINUED | OUTPATIENT
Start: 2019-10-17 | End: 2019-10-19 | Stop reason: HOSPADM

## 2019-10-17 RX ADMIN — DIPHENHYDRAMINE HYDROCHLORIDE 12.5 MG: 50 INJECTION, SOLUTION INTRAMUSCULAR; INTRAVENOUS at 07:02

## 2019-10-17 RX ADMIN — METHYLPREDNISOLONE 20 MG: 16 TABLET ORAL at 08:45

## 2019-10-17 RX ADMIN — LISINOPRIL 10 MG: 10 TABLET ORAL at 15:37

## 2019-10-17 RX ADMIN — HYDRALAZINE HYDROCHLORIDE 10 MG: 20 INJECTION INTRAMUSCULAR; INTRAVENOUS at 10:12

## 2019-10-17 RX ADMIN — AMLODIPINE BESYLATE 10 MG: 10 TABLET ORAL at 08:46

## 2019-10-17 RX ADMIN — DIPHENHYDRAMINE HYDROCHLORIDE 12.5 MG: 50 INJECTION, SOLUTION INTRAMUSCULAR; INTRAVENOUS at 06:03

## 2019-10-17 RX ADMIN — OLANZAPINE 2.5 MG: 2.5 TABLET, FILM COATED ORAL at 21:49

## 2019-10-17 RX ADMIN — HYDRALAZINE HYDROCHLORIDE 10 MG: 20 INJECTION INTRAMUSCULAR; INTRAVENOUS at 22:24

## 2019-10-17 RX ADMIN — HYDRALAZINE HYDROCHLORIDE 10 MG: 20 INJECTION INTRAMUSCULAR; INTRAVENOUS at 04:08

## 2019-10-17 RX ADMIN — METOCLOPRAMIDE 10 MG: 5 INJECTION, SOLUTION INTRAMUSCULAR; INTRAVENOUS at 06:03

## 2019-10-17 RX ADMIN — NORTRIPTYLINE HYDROCHLORIDE 10 MG: 10 CAPSULE ORAL at 21:49

## 2019-10-17 NOTE — RESTORATIVE TECHNICIAN NOTE
Restorative Specialist Mobility Note       Activity: Ambulate in lerma, Ambulate in room, Bathroom privileges, Chair, Dangle, Stand at bedside(Educated/encouraged pt to ambulate with assistance 3-4 x's/day  Chair alarm on  Pt callbell, phone/tray within reach )     Assistive Device: Front wheel walker, Other (Comment)(Assist x1 with chair follow   Assisted PTA Kelly Christianson )          Noreen SANDERS, Restorative Technician,

## 2019-10-17 NOTE — SOCIAL WORK
Met with pt to discuss pt's discharge plan and to follow up on inpt rehab choices  He stated that he dicussed inpt rehab facilities with his mom and requested CM to contact his mom to obtain rehab choices  CM attempted to contact pt's mom Benjamin Pérez, contact # [phone], with no answer and unable to leave a message

## 2019-10-17 NOTE — QUICK NOTE
Quick note:    Paged by nurse  Patient reporting severe migraine located behind right eye  Chart reviewed and patient seen and evaluated at bedside  Patient has history of chronic migraines and was taking sumatriptan as outpatient  He reports that he was taking frequently  States he was briefly on propranolol but does not appear he was on any other prophylactic medications for migraine  Patient was given PRN Fiorcet with no relief  Earlier in the evening, patient given IV magnesium 1 gm and IV Benadryl  Patient is already on methylprednisolone  This provided no relief  Still complaining of severe pain  Hemodynamically stable  Limited options for migraine relief given presence of IPH  Patient did ask for sumatriptan but I have advised patient that I would like to avoid this medication given his IPH  Would also avoid NSAIDs for same reason and also already on steroids  Will trial IV Reglan 10 mg x1 dose with concurrent IV Benadryl 12 5 mg Q1H x2 doses (to minimize risk of EPS given Zyprexa)  Patient is agreeable to this  Will discontinue Fiorcet as it does not appear to have any use in this situation  Will continue to monitor  Would recommend initiation of migraine prophylaxis medication  Will inform primary team in the morning

## 2019-10-17 NOTE — SPEECH THERAPY NOTE
Speech Language/Pathology    Speech/Language Pathology Progress Note    Patient Name: Mally Brown  Today's Date: 10/17/2019     Subjective:  "It takes a long time to chew"      Objective:  Patient was asleep on my arrival but readily awoke with verbal stimuli  OME reassessed- patient continues with reduced labial strength and ROM on the left- mildly reduced lingual strength and ROM on the L as well as facial asymmetry on the L  He denies dysarthria or aphasia symptoms but does admit to change in vocal quality which he characterizes as hoarse and "weird" pitch  Patient was noted to have pitch breaks as well as diplophonia at times  Patient admits to pocketing at times on the L as well as coughing x3 on thin liquids since admission  Patient was seen with lunch tray ( regular carrots, chicken, and rice with thin juice)  Adequate bolus retrieval and containment noted  Mastication was mildly prolonged but generally WFL  Oral processing was mildly reduced with pocketing on the left  No s/sx of aspiration observed despite cues to trial large and consecutive cup and straw sips  We discussed current diet, A&P of the swallow as well as as well recommendations to order additional moisteners and utilize strategies  Reciprocal comprehension was verbally expressed  Assessment:  Patient continues with mild oral dysphagia  Vocal pathology and pharyngeal dysphagia cannot be ruled out at this time however he does appear to be safe on current diet of regular/thins with use of strategies which patient appears to be independent with       Plan/Recommendations:  Continue regular/thin with strategies below  Aspiration precautions and compensatory swallowing strategies: upright posture, masticate on the right, slow rate of feeding, small bites/sips, alternating bites and sips and use of lingual sweeps     Will continue to follow    JUWAN Ortega , CCC-SLP  Speech Language Pathologist   Available via [address] [place] [address] #[id number]  [place] #[id number]

## 2019-10-17 NOTE — PLAN OF CARE
Problem: OCCUPATIONAL THERAPY ADULT  Goal: Performs self-care activities at highest level of function for planned discharge setting  See evaluation for individualized goals  Description  Treatment Interventions: ADL retraining, Functional transfer training, UE strengthening/ROM, Endurance training, Cognitive reorientation, Patient/family training, Equipment evaluation/education, Activityengagement, Energy conservation, Continued evaluation, Compensatory technique education, Fine motor coordination activities, Neuromuscular reeducation          See flowsheet documentation for full assessment, interventions and recommendations  Outcome: Progressing  Note:   Limitation: Decreased ADL status, Decreased UE ROM, Decreased UE strength, Decreased Safe judgement during ADL, Decreased cognition, Decreased endurance, Decreased fine motor control, Decreased self-care trans, Decreased high-level ADLs, Non-func L UE  Prognosis: Fair  Assessment: Patient participated in skillled OT with focus on functional transfers, adl/self care tasks, activity endurance, functional balance, bathing, dressing  Patient identified by name and   Patient pleasant and motivated throughout session requiring intermittent rest periods  Patient required intermittent vc's to plan and sequence particularly with unfamiliar routine tasks  Patient would benefit from [address] with focus on increasing functional strength and endurance, increasing functional independence and safety with transfers and mobility skills, increasing functional independence with dressing and bathing for carryover into his daily routine        OT Discharge Recommendation: Short Term Rehab  OT - OK to Discharge: (when medically cleared)  Maddy Butterfield

## 2019-10-17 NOTE — ASSESSMENT & PLAN NOTE
Identified on CTa 10/14    Stable   -control hypertension  -will require ASA 81 mg which was started on 10/21 per Neurosurgery recommendation  -follow-up CTA in 1 week

## 2019-10-17 NOTE — ASSESSMENT & PLAN NOTE
Repeat CT head without contrast on 10/15 revealed interval expansion of right lentiform nucleus hematoma with increase in volume and surrounding edema with mass effect on right ventricular system  No herniation noted  Will also GCS 15  Repeat CT head on 10/16 demonstrates stable bleed  Improving neurologic examination  Continues to complain of left-sided weakness and sensory deficit   -Blood pressure control  -continue amlodipine 10 mg p o   Daily  -Lisinopril 20 mg daily every morning

## 2019-10-17 NOTE — PROGRESS NOTES
Progress Note - Behavioral Health   Gisel Dowling 45 y o  male MRN: [id number]  [facility] Encounter: [id number]        I came to see the patient continuation of care he is feeling better and more hopeful  He states last he has severe headache but he responded to the medication that was given to him  He still has some poor appetite, but he has good night's sleep  At this moment he denies any suicidal ideation plan or intent  He states that he is very positive that he will get better  Behavior over the last 24 hours:  improved  Sleep: normal  Appetite: poor  Medication side effects: No  ROS: no complaints    Mental Status Evaluation:  Appearance:  younger than stated age   Behavior:  normal   Speech:  soft   Mood:  anxious   Affect:  mood-congruent   Language: naming objects and repeating phrases   Thought Process:  goal directed   Associations: intact associations   Thought Content:  normal   Perceptual Disturbances: None   Risk Potential: He denies any suicidal homicidal ideation plan or intent   Sensorium:  person, place, time/date and situation   Memory:  recent and remote memory grossly intact   Cognition:  grossly intact   Consciousness:  alert and awake    Attention: attention span and concentration were age appropriate   Intellect: within normal limits   Fund of Knowledge: awareness of current events: Fair, past history: Fair and vocabulary: Fair   Insight:  good   Judgment: good   Muscle Strength and Tone: Within normal limits   Gait/Station: Difficulty ambulating   Motor Activity: no abnormal movements         Assessment/Plan  Gisel Dowling is a 45 y o  male with a history of migraines, hypertension, and depression presented to the hospital for evaluation of acute onset left-sided facial weakness and left arm weakness, he had and intraparenchymal brain hemorrhage  He had been ordered for depression and suicidal ideation    Today patient states that he feels better, he is see a light at the end of the tunnel, he states that he is very positive because he is improving now he thinks that he he will be able to the treatment  He did headache last night but responded to the medication they gave to him  Patient denies any suicidal homicidal ideation plan or intent, he denies any psychotic symptoms  Diagnosis:  Major 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 psychotic features    Recommended Treatment:   Continue medical management  Zyprexa at 2 5 mg p o  HS  Patient can be followed by the primary physician upon discharge  A do not have any other intervention at this time  Discussed with primary team  I will sign off but contact me back is necessary      Medications:   current meds:   Current Facility-Administered Medications   Medication Dose Route Frequency    amLODIPine (NORVASC) tablet 10 mg  10 mg Oral Daily    hydrALAZINE (APRESOLINE) injection 10 mg  10 mg Intravenous Q4H PRN    lisinopril (ZESTRIL) tablet 10 mg  10 mg Oral Daily    [START ON 10/18/2019] methylprednisolone (MEDROL) tablet 16 mg  16 mg Oral Daily    Followed by   Diandra Kava ON 10/19/2019] methylprednisolone (MEDROL) tablet 12 mg  12 mg Oral Daily    Followed by   Diandra Kava ON 10/20/2019] methylprednisolone (MEDROL) tablet 8 mg  8 mg Oral Daily    Followed by   Diandra Kava ON 10/21/2019] methylprednisolone (MEDROL) tablet 4 mg  4 mg Oral Daily    OLANZapine (ZyPREXA) tablet 2 5 mg  2 5 mg Oral HS         Risks, benefits and possible side effects of Medications:     Risks, benefits, and possible side effects of medications explained to patient and patient verbalizes understanding  Labs: I have personally reviewed all pertinent laboratory results  I have personally reviewed all pertinent laboratory/tests results    Labs in last 72 hours:   Recent Labs     10/14/19  1500 10/15/19  0533 10/16/19  0511   WBC 5 20  --  6 76   RBC 4 90  --  4 78   HGB 15 3 15 2 14 7   HCT 43 6 42 4 42 3   *  --  145*   RDW 13 7  --  13 2 NEUTROABS  --   --  4 50   SODIUM 141 137 143   K 3 6 3 6 3 5    104 110*   CO2 28 23 26   BUN 13 9 16   CREATININE 1 18 0 87 1 10   GLUC 101* 107 84   CALCIUM 9 5 8 8 8 8   AST 19  --   --    ALT 26  --   --    ALKPHOS 58  --   --    TP 6 9  --   --    ALB 4 7  --   --    TBILI 0 60  --   --    CHOLESTEROL  --  126  --    HDL  --  49  --    TRIG  --  31  --    LDLCALC  --  71  --          Sheila Jorge MD

## 2019-10-17 NOTE — ASSESSMENT & PLAN NOTE
· Right IPH, ICH 0  Presented 10/14 to [facility] ED with left side weakness, "not feeling right" s/p lifting heavy weights in gym  · BP on presentation was 208/129  Denies history of hypertension, states he was told in 2012 he had "white coat syndrome "  · Exam significantly improved, now with strength 3-4/5 to left upper and lower extremities  Continues to have left facial droop, left pronator drift  · Imaging reviewed by myself and attending as follows:  · CT head 10/16/19: no significant interval change in size of a right basal ganglia intraparenchymal hematoma since 10/15/19  No new areas of acute intracranial hemorrhage are identified  · CT head 10/15/19:  Interval expansion of the right lentiform nucleus hematoma with increase in volume from 9 4 mL to 19 3 mL  There is slightly greater surrounding edema and mass effect on the right ventricular system and MCA cistern without herniation  · MRI brain 10/15/19:  Interval expansion of the right lentiform nucleus hematoma with hyperacute, acute and subacute blood products  The hematoma cavity demonstrates interval expansion when compared to the recent CT scan, now measuring 22 mL  There is mild surrounding edema and mass effect on the right ventricular system with asymmetric dilatation of the right temporal horn which may represent early entrapment  · CT head 10/14/19: acute intraparenchymal hemorrhage centered at the right lentiform nucleus with mild surrounding vasogenic edema  No significant mass effect or midline shift  · CTA 10/14/19: unremarkable  Examined with neuroradiologist and noted with right vertebral artery aneurysm  · STAT CT head if decrease in GCS > 2 points in 1 hour  · In light of right vertebral artery aneurysm, would recommend long term ASA therapy in approximately one week if CT head is stable  Repeat CTA in one week  · Cardene gtt d/c'd  Started on Norvasc and lisinopril  Recommend SBP < 160 mmHg   Currently SBP 140s-180s/24 hours   · Psych consulted for history of depression - recommend zyprexa 2 5 mg qhs  · Neurosurgery will sign off  Will review CTA imaging once completed  Please call with any questions or concerns

## 2019-10-17 NOTE — PROGRESS NOTES
INTERNAL MEDICINE RESIDENCY PROGRESS NOTE     Name: Jovita Lora   Age & Sex: 45 y o  male   MRN: [id number]  Unit/Bed#: [facility] 722-01   Encounter: [id number]  Team: SOD Team A    PATIENT INFORMATION     Name: Jovita Lora   Age & Sex: 45 y o  male   MRN: [id number]  Hospital Stay Days: 3    ASSESSMENT/PLAN     Principal Problem:    Intraparenchymal hemorrhage of brain [facility])  Active Problems:    Depression    Left-sided weakness    Suicidal ideation    Migraine    Vertebral artery aneurysm ([facility] [address] )    1  Intraparenchymal hemorrhage brain  Repeat CT head without contrast on 10/15 revealed interval expansion of right lentiform nucleus hematoma with increase in volume and surrounding edema with mass effect on right ventricular system  No herniation noted  Will also GCS 15  Repeat CT head on 10/16 demonstrates stable bleed  Patient requiring p r n  Hydralazine for systolic blood pressure greater [id number] frequently  Improving neurologic examination  Continues to complain of left-sided weakness and sensory deficit   -Blood pressure control  -continue amlodipine 10 mg p o  Daily  -will add 10 mg lisinopril for this afternoon; continue every morning  -hydralazine p r n  for systolic blood pressure greater than 140    2  Suicidal ideation  Currently denies suicidal and homicidal ideation  Psychiatry is following  One-to-one was discontinued  -appreciate Psychiatry recommendations   -continue Zyprexa 2 5 mg    3  Migraine headache  Patient has a history of migraines  In addition, likely has had prolonged, uncontrolled hypertension  Last night, patient endorsed persistent right-sided retro-orbital pain  Patient has a history of chronic migraines treated with sumatriptan  He does not have any prophylactic medications for migraine  He was given IV magnesium and IV Benadryl with no relief  However, added Reglan and Benadryl which seemed to help    This morning, patient has no headache   -will need to be discharged with migraine prophylaxis  -avoid sumatriptan and NSAIDs in the setting of intracranial hemorrhage  -for future migraines inpatient, treat with IV magnesium 1 g, IV Reglan and IV Benadryl    4  Vertebral artery aneurysm, right  Identified on CTa 10/14  Stable   -control hypertension  -will require ASA 81 mg which was started on 10/21 per Neurosurgery recommendation  -follow-up CTA in 1 week    5  Depression  Psychiatry following  appreciate recommendation    Disposition: Likely require acute rehab      SUBJECTIVE     Patient seen and examined  Headache overnight s/p iv benadryl, Mg, Reglan  Patient endorses improved headache this morning  He continues to complain of left-sided weakness  No other complaints at this time  OBJECTIVE     Vitals:    10/17/19 1010 10/17/19 1100 10/17/19 1103 10/17/19 1104   BP: 155/88  158/79 162/80   BP Location: Right arm  Right arm Right arm   Pulse:  90     Resp: 18      Temp:       TempSrc:       SpO2:  97%     Weight:       Height:          Temperature:   Temp (24hrs), Av 6 °F (37 °C), Min:98 1 °F (36 7 °C), Max:100 4 °F (38 °C)    Temperature: 98 2 °F (36 8 °C)  Intake & Output:  I/O       10/15 07 - 10/16 0700 10/16 07 - 10/17 0700 10/17 07 - 10/18 0700    P  O   720     I V  (mL/kg) 400 4 (5 8)      Total Intake(mL/kg) 400 4 (5 8) 720 (10 4)     Urine (mL/kg/hr) 1775 (1 1) 800 (0 5)     Emesis/NG output       Total Output 1775 800     Net -1374 6 -80            Unmeasured Urine Occurrence 1 x          Weights:   IBW: 66 1 kg    Body mass index is 24 kg/m²  Weight (last 2 days)     Date/Time   Weight    10/16/19 1629   69 5 (153 22)            Physical Exam   Constitutional: He is oriented to person, place, and time  He appears well-developed and well-nourished  HENT:   Head: Normocephalic and atraumatic  Eyes: Pupils are equal, round, and reactive to light  EOM are normal    Neck: Normal range of motion  No JVD present     Cardiovascular: Normal rate, regular rhythm and normal heart sounds  Pulmonary/Chest: Effort normal and breath sounds normal  No respiratory distress  Abdominal: Soft  Bowel sounds are normal  He exhibits no distension  There is no tenderness  There is no guarding  Musculoskeletal: Normal range of motion  He exhibits no edema or tenderness  Neurological: He is alert and oriented to person, place, and time  A cranial nerve deficit and sensory deficit is present  He exhibits abnormal muscle tone  Coordination abnormal    Left-sided facial droop  Improvement in sensory defecits  Moves all extremities; obeys commands   Strenght: LUE 4/5, LLE 4/5  Coordination abnormal  +pronator drift on left side   Nursing note and vitals reviewed  LABORATORY DATA     Labs: I have personally reviewed pertinent reports  Results from last 7 days   Lab Units 10/16/19  0511 10/15/19  0533 10/14/19  1500   WBC Thousand/uL 6 76  --  5 20   HEMOGLOBIN g/dL 14 7 15 2 15 3   HEMATOCRIT % 42 3 42 4 43 6   PLATELETS Thousands/uL 145*  --  147*   NEUTROS PCT % 67  --   --    MONOS PCT % 8  --   --       Results from last 7 days   Lab Units 10/16/19  0511 10/15/19  0533 10/14/19  1500   POTASSIUM mmol/L 3 5 3 6 3 6   CHLORIDE mmol/L 110* 104 105   CO2 mmol/L 26 23 28   BUN mg/dL 16 9 13   CREATININE mg/dL 1 10 0 87 1 18   CALCIUM mg/dL 8 8 8 8 9 5   ALK PHOS U/L  --   --  58   ALT U/L  --   --  26   AST U/L  --   --  19     Results from last 7 days   Lab Units 10/16/19  0511 10/15/19  0533   MAGNESIUM mg/dL 2 2 1 9     Results from last 7 days   Lab Units 10/16/19  0511 10/15/19  0533   PHOSPHORUS mg/dL 2 7 3 1      Results from last 7 days   Lab Units 10/14/19  1500   INR  1 03   PTT seconds 37         Results from last 7 days   Lab Units 10/14/19  1500   TROPONIN I ng/mL <0 03       IMAGING & DIAGNOSTIC TESTING     Radiology Results: I have personally reviewed pertinent reports    X-ray Chest 1 View Portable    Result Date: 10/14/2019  Impression: No acute cardiopulmonary disease  Workstation performed: [id number]     Ct Head Wo Contrast    Result Date: 10/16/2019  Impression: No significant interval change in size of a right basal ganglia intraparenchymal hematoma since 10/15/2019  No new areas of acute intracranial hemorrhage are identified  Workstation performed: [id number]     Ct Head Wo Contrast    Result Date: 10/16/2019  Impression: Interval expansion of the right lentiform nucleus hematoma with increase in volume from 9 4 mL 219 3 mL  There is slightly greater surrounding edema and mass effect on the right ventricular system and MCA cistern without herniation  Short-term follow-up evaluation is recommended to assess for stability  I personally discussed this study with Dr Vi Melchor on 10/16/2019 at 8:29 AM  Workstation performed: [id number]     Mri Brain Wo Contrast    Result Date: 10/16/2019  Impression: Interval expansion of the right lentiform nucleus hematoma with hyperacute, acute and subacute blood products  The hematoma cavity demonstrates interval expansion when compared to the recent CT scan, now measuring 22 mL  Presumed etiology is hypertension however, correlation for underlying coagulopathy is recommended  There is mild surrounding edema and mass effect on the right ventricular system with asymmetric dilatation of the right temporal horn which may represent early entrapment  Follow-up CT evaluation is recommended  I personally discussed this study with Dr Vi Melchor on 10/16/2019 at 8:13 AM  Workstation performed: [id number]     Ct Stroke Alert Brain    Result Date: 10/14/2019  Impression: Acute intraparenchymal hemorrhage centered at the right lentiform nucleus with mild surrounding vasogenic edema  No significant mass effect or midline shift  Findings were directly discussed with GRAHAM NAVARRO on 10/14/2019 2:59 PM  Workstation performed: [id number]     Cta Stroke Alert (head/neck)    Result Date: 10/14/2019  Impression: 1    Unremarkable CT angiogram of the brain  No CTA spot sign  2   Unremarkable CT angiogram of the neck  Findings were directly discussed with Dr Rodney Hyatt on 10/14/2019 3:20 PM  Workstation performed: [id number]     Other Diagnostic Testing: I have personally reviewed pertinent reports  ACTIVE MEDICATIONS     Current Facility-Administered Medications   Medication Dose Route Frequency    amLODIPine (NORVASC) tablet 10 mg  10 mg Oral Daily    hydrALAZINE (APRESOLINE) injection 10 mg  10 mg Intravenous Q4H PRN    lisinopril (ZESTRIL) tablet 10 mg  10 mg Oral Daily    [START ON 10/18/2019] methylprednisolone (MEDROL) tablet 16 mg  16 mg Oral Daily    Followed by   Akshat Bake ON 10/19/2019] methylprednisolone (MEDROL) tablet 12 mg  12 mg Oral Daily    Followed by   Akshat Bake ON 10/20/2019] methylprednisolone (MEDROL) tablet 8 mg  8 mg Oral Daily    Followed by   Akshat Bake ON 10/21/2019] methylprednisolone (MEDROL) tablet 4 mg  4 mg Oral Daily    OLANZapine (ZyPREXA) tablet 2 5 mg  2 5 mg Oral HS       VTE Pharmacologic Prophylaxis: Reason for no pharmacologic prophylaxis ICH  VTE Mechanical Prophylaxis: sequential compression device    Portions of the record may have been created with voice recognition software  Occasional wrong word or "sound a like" substitutions may have occurred due to the inherent limitations of voice recognition software    Read the chart carefully and recognize, using context, where substitutions have occurred   ==  Oma Shelby MD  [address] [facility]  Internal Medicine Residency PGY-1

## 2019-10-17 NOTE — ASSESSMENT & PLAN NOTE
Patient has a history of migraines  In addition, likely has had prolonged, uncontrolled hypertension which may be playing a role      -Nortryptiline started as migraine ppx by night team; switch to amitriptyline 20 mg q hs  -lisinopril  has been shown to be an effective migraine prophylaxis as well; continue lisinopril 20 qd   -avoid sumatriptan and NSAIDs in the setting of intracranial hemorrhage; for abortive therapy, tylenol 650mg q6h PRN until 10/21 when patient can safely take Excedrin PRN   -Outpatient neurology follow-up

## 2019-10-17 NOTE — PHYSICAL THERAPY NOTE
Physical Therapy Progress Note     10/17/19 1212   Pain Assessment   Pain Assessment No/denies pain   Pain Score No Pain   Restrictions/Precautions   Weight Bearing Precautions Per Order No   Other Precautions Chair Alarm; Fall Risk   General   Family/Caregiver Present No   Cognition   Overall Cognitive Status WFL   Arousal/Participation Alert; Cooperative   Transfers   Sit to Stand 4  Minimal assistance   Additional items Assist x 1;Verbal cues   Stand to Sit 4  Minimal assistance   Additional items Assist x 1   Ambulation/Elevation   Gait pattern Narrow RADHA  (L sided weaknes/sway)   Gait Assistance 4  Minimal assist   Additional items Assist x 1   Assistive Device Rolling walker   Distance 100 feet   Balance   Static Sitting Fair +   Dynamic Sitting Fair   Static Standing Fair   Dynamic Standing Fair -   Ambulatory Fair -   Endurance Deficit   Endurance Deficit Yes   Endurance Deficit Description fatigue   Activity Tolerance   Activity Tolerance Patient limited by fatigue   Nurse [address] to see per YOLANDA Lucero   Exercises   Hip Flexion Sitting;20 reps;AROM; Bilateral   Knee AROM Long Arc Quad Sitting;20 reps;AROM; Bilateral   Ankle Pumps Sitting;20 reps;AROM; Bilateral   Assessment   Prognosis Good   Problem List Decreased endurance; Impaired balance;Decreased mobility; Decreased coordination   Assessment Pt is making steady progress with functional mobility  Limited by fatigue and L sided weakness  Min assist required for both with the use of a rolling walker and without  Pt does veer left with further distance during ambulation trials  Cues for hand placement with safety for transfers  Restorative tech present for stand by assist   Completed seated BLE exercises to conclude session  Chair alarm active post session  Pt would benefit from continued physical therapy to maximize functional independence     Goals   Patient Goals To get stronger   STG Expiration Date 10/29/19   Short Term Goal #1 In 10 sessions pt will: 1  Transfer supine to sit with supervision 2  Sit EOB ~10 min with supervision  3  Perform LE exercise program 4  Transfer sit to stand with LRAD and mod A x1 5  Ambulate > 50 ft with LRAD and modA x1    PT Treatment Day 1   Plan   Treatment/Interventions Functional transfer training;LE strengthening/ROM; Therapeutic exercise; Endurance training;Patient/family training;Bed mobility;Gait training;Spoke to nursing   Progress Progressing toward goals   PT Frequency   (3-6x/week)   Recommendation   Recommendation   (Rehab)   Equipment Recommended [place]  (MIGEL)     Sharon Wiseman, PTA

## 2019-10-17 NOTE — PROGRESS NOTES
Progress Note - Neurology   Yanick Hyde 45 y o  male MRN: [id number]  Unit/Bed#: [facility] 722-01 Encounter: [id number]    Assessment:  A 45years old male with past medical history of white coat hypertension not on treatment and depression as well as chronic headache presented with left upper extremity weakness and left facial droop found to have right intraparenchymal hemorrhage likely due to hypertension with significant headache  Plan:  # Right Lenticular IPH  · Secondary to likely poorly uncontrolled hypertension  · Control blood pressure less than 140  · Agree with lisinopril 10 mg  to amlodipine 10 for cardiovascular benefit  · Repeat MRI showed increase of right lentiform nucleus hematoma with 22 mL volume with mild surrounding edema and mass effect on the right ventricular system with asymmetric dilatation of right temporal horn which may represent early entrapment  · Repeat CT head 10/16/19  no significant change in size of right basal ganglia intraparenchymal hematoma since 10/15/2019  · Frequent neuro q 4 hours  · Repeat MRI in 3 months and follow-up with Neurology in 3 months  · No antiplatelet or anticoagulation for now  # Headache   · Chronic, tension versus less likely cluster or  Migraine  · Tylenol 975 mg t i d  P r n  For headache  · Start nortriptyline 10 mg at bedtime QTC reviewed in the  430's  Hopefully it will help with prophylaxis of headache as well as depression  # depression   · Management for primary team    Subjective:   Patient seen and examined at bedside  Overnight has significant severe headache tried Fioricet with minimum relief as well as migraine cocktail  He perceived improvement in his left upper extremity strength  He denied nausea or vomiting  Vitals: Blood pressure 134/64, pulse 90, temperature 98 2 °F (36 8 °C), temperature source Oral, resp  rate 18, height 5' 7" (1 702 m), weight 69 5 kg (153 lb 3 5 oz), SpO2 97 %  ,Body mass index is 24 kg/m²      Physical Exam: /75   Pulse 85   Temp 98 6 °F (37 °C)   Resp 18   Ht 5' 7" (1 702 m)   Wt 69 5 kg (153 lb 3 5 oz)   SpO2 94%   BMI 24 00 kg/m²   General appearance: alert and oriented, in no acute distress  Eyes: Extraocular muscle intact, pupils equally round reactive to light bilaterally  Lungs: clear to auscultation bilaterally  Heart: regular rate and rhythm, S1, S2 normal, no murmur, click, rub or gallop  Abdomen: Soft, nontender, nondistended hypoactive bowel sounds  Extremities: No lower extremity edema  Neurologic: Mental status: Alert, oriented, thought content appropriate  Cranial nerves: II: visual field normal, III,IV,VI: extraocular muscles extra-ocular motions intact, V: facial light touch sensation normal Left facial droop, VII: upper facial muscle function normal bilaterally, VII: lower facial muscle function reduced on the left  Sensory: Decrease incision in the left upper and left lower extremities  Motor: grossly normal  Reflexes: 2+ and symmetric    Lab, Imaging and other studies: I have personally reviewed pertinent reports  VTE Prophylaxis: Sequential compression device (Venodyne)  and Reason for no pharmacologic prophylaxis Intra parenchymal hemorrhage    Counseling / Coordination of Care  Total time spent today 20 minutes  Greater than 50% of total time was spent with the patient and / or family counseling and / or coordination of care  A description of the counseling / coordination of care: Cesar Wills MD  Available on [employer]  THE [facility]  Internal medicine resident

## 2019-10-17 NOTE — PROGRESS NOTES
Progress Note - Yanick Hyde 1975, 45 y o  male MRN: [id number]    Unit/Bed#: [facility] 722-01 Encounter: [id number]    Primary Care Provider: No primary care provider on file  Date and time admitted to hospital: 10/14/2019  4:46 PM        Suicidal ideation  Assessment & Plan  Psych consulted - appreciate recommendations  Left-sided weakness  Assessment & Plan  See above  * Intraparenchymal hemorrhage of brain [facility])  Assessment & Plan  · Right IPH, ICH 0  Presented 10/14 to [facility] ED with left side weakness, "not feeling right" s/p lifting heavy weights in gym  · BP on presentation was 208/129  Denies history of hypertension, states he was told in 2012 he had "white coat syndrome "  · Exam significantly improved, now with strength 3-4/5 to left upper and lower extremities  Continues to have left facial droop, left pronator drift  · Imaging reviewed by myself and attending as follows:  · CT head 10/16/19: no significant interval change in size of a right basal ganglia intraparenchymal hematoma since 10/15/19  No new areas of acute intracranial hemorrhage are identified  · CT head 10/15/19:  Interval expansion of the right lentiform nucleus hematoma with increase in volume from 9 4 mL to 19 3 mL  There is slightly greater surrounding edema and mass effect on the right ventricular system and MCA cistern without herniation  · MRI brain 10/15/19:  Interval expansion of the right lentiform nucleus hematoma with hyperacute, acute and subacute blood products  The hematoma cavity demonstrates interval expansion when compared to the recent CT scan, now measuring 22 mL  There is mild surrounding edema and mass effect on the right ventricular system with asymmetric dilatation of the right temporal horn which may represent early entrapment  · CT head 10/14/19: acute intraparenchymal hemorrhage centered at the right lentiform nucleus with mild surrounding vasogenic edema   No significant mass effect or midline shift   · CTA 10/14/19: unremarkable  Examined with neuroradiologist and noted with right vertebral artery aneurysm  · STAT CT head if decrease in GCS > 2 points in 1 hour  · In light of right vertebral artery aneurysm, would recommend long term ASA therapy in approximately one week if CT head is stable  Repeat CTA in one week  · Cardene gtt d/c'd  Started on Norvasc and lisinopril  Recommend SBP < 160 mmHg  Currently SBP 140s-180s/24 hours  · Psych consulted for history of depression - recommend zyprexa 2 5 mg qhs  · Neurosurgery will sign off  Will review CTA imaging once completed  Please call with any questions or concerns  Subjective/Objective   Chief Complaint: "I'm feeling good "    Subjective: Patient resting comfortably in bed  Denies headache currently but states last night was experiencing very bad headache and couldn't sleep well  This finally resolved  Denies any new numbness or weakness  Denies any nausea or vision changes  Objective: Continues to demonstrate left side facial droop, left upper and lower extremity weakness, and left pronator drift  Visual fields have normalized  States he is looking forward to rehab because he "wants to feel better "    I/O       10/15 0701 - 10/16 0700 10/16 0701 - 10/17 0700 10/17 0701 - 10/18 0700    P  O   720 240    I V  (mL/kg) 400 4 (5 8)      Total Intake(mL/kg) 400 4 (5 8) 720 (10 4) 240 (3 5)    Urine (mL/kg/hr) 1775 (1 1) 800 (0 5)     Emesis/NG output       Total Output 1775 800     Net -1374 6 -80 +240           Unmeasured Urine Occurrence 1 x            Invasive Devices     Peripheral Intravenous Line            Peripheral IV 10/14/19 Distal;Left;Ventral (anterior) Forearm 2 days    Peripheral IV 10/14/19 Left Forearm 2 days                Physical Exam:  Vitals: Blood pressure 155/88, pulse 83, temperature 98 2 °F (36 8 °C), temperature source Oral, resp  rate 18, height 5' 7" (1 702 m), weight 69 5 kg (153 lb 3 5 oz), SpO2 95 %  ,Body mass index is 24 kg/m²  General appearance: alert, appears stated age, cooperative and no distress  Head: Normocephalic, without obvious abnormality, atraumatic  Eyes: EOMI, PERRL, pupils 3 mm bilaterally  Neck: supple, symmetrical, trachea midline and NT  Back: no kyphosis present, no tenderness to percussion or palpation  Lungs: non labored breathing  Heart: regular heart rate  Neurologic:   Mental status: Alert, oriented x3, thought content appropriate  Cranial nerves: grossly intact (Cranial nerves II-XII), left facial droop  Sensory: normal to light touch, JPS, DST  Motor: moving all extremities, strength to RUE/RLL 4-5/5, LUE 3-4/5, LLE 4/5, DF 3/5  Reflexes: 2+ and symmetric, no [place]'s or clonus  Coordination: finger to nose mildly ataxic, left pronator drift      Lab Results:  Results from last 7 days   Lab Units 10/16/19  0511 10/15/19  0533 10/14/19  1500   WBC Thousand/uL 6 76  --  5 20   HEMOGLOBIN g/dL 14 7 15 2 15 3   HEMATOCRIT % 42 3 42 4 43 6   PLATELETS Thousands/uL 145*  --  147*   NEUTROS PCT % 67  --   --    MONOS PCT % 8  --   --      Results from last 7 days   Lab Units 10/16/19  0511 10/15/19  0533 10/14/19  1500   POTASSIUM mmol/L 3 5 3 6 3 6   CHLORIDE mmol/L 110* 104 105   CO2 mmol/L 26 23 28   BUN mg/dL 16 9 13   CREATININE mg/dL 1 10 0 87 1 18   CALCIUM mg/dL 8 8 8 8 9 5   ALK PHOS U/L  --   --  58   ALT U/L  --   --  26   AST U/L  --   --  19     Results from last 7 days   Lab Units 10/16/19  0511 10/15/19  0533   MAGNESIUM mg/dL 2 2 1 9     Results from last 7 days   Lab Units 10/16/19  0511 10/15/19  0533   PHOSPHORUS mg/dL 2 7 3 1     Results from last 7 days   Lab Units 10/14/19  1500   INR  1 03   PTT seconds 37     No results found for: TROPONINT  ABG:No results found for: PHART, XRH4YQT, PO2ART, USS2SIJ, W0RYCJWY, BEART, SOURCE    Imaging Studies: I have personally reviewed pertinent reports     and I have personally reviewed pertinent films in PACS    EKG, Pathology, and Other Studies: I have personally reviewed pertinent reports        VTE Pharmacologic Prophylaxis: Sequential compression device (Venodyne)     VTE Mechanical Prophylaxis: sequential compression device

## 2019-10-17 NOTE — OCCUPATIONAL THERAPY NOTE
Occupational Therapy Treatment Note:       10/17/19 1225   Restrictions/Precautions   Other Precautions Cognitive; Chair Alarm;Telemetry; Fall Risk   Pain Assessment   Pain Assessment No/denies pain   Pain Score No Pain   ADL   Where Assessed Other (Comment)  (seated and in stance)   Grooming Assistance 5  Supervision/Setup   Grooming Deficit Setup; Wash/dry hands; Wash/dry face   UB Bathing Assistance 4  Minimal Assistance   UB Bathing Deficit Setup;Supervision/safety  (assist with washing back)   LB Bathing Assistance 3  Moderate Assistance   LB Bathing Deficit Setup;Steadying;Verbal cueing;Supervision/safety; Increased time to complete   UB Dressing Assistance 4  Minimal Assistance   UB Dressing Deficit Setup   LB Dressing Assistance 3  Moderate Assistance   LB Dressing Deficit Setup;Steadying; Requires assistive device for steadying;Verbal cueing;Supervision/safety   Transfers   Sit to Stand 4  Minimal assistance   Additional items Assist x 1; Armrests; Verbal cues   Stand to Sit 4  Minimal assistance   Additional items Assist x 1; Armrests; Increased time required   Stand pivot 4  Minimal assistance   Additional items Assist x 1;Verbal cues   Cognition   Overall Cognitive Status [facility]   Arousal/Participation Alert; Cooperative   Attention Within functional limits   Orientation Level Oriented X4   Memory Within functional limits   Following Commands Follows one step commands without difficulty   Comments pleasant and motivated   Activity Tolerance   Activity Tolerance Patient limited by fatigue  (does well with rest periods)   Medical Staff Made Aware patient cleared for OT by RN   Assessment   Assessment Patient participated in skillled OT with focus on functional transfers, adl/self care tasks, activity endurance, functional balance, bathing, dressing  Patient identified by name and   Patient pleasant and motivated throughout session requiring intermittent rest periods   Patient required intermittent vc's to plan and sequence particularly with unfamiliar routine tasks  Patient would benefit from [address] with focus on increasing functional strength and endurance, increasing functional independence and safety with transfers and mobility skills, increasing functional independence with dressing and bathing for carryover into his daily routine  Plan   Treatment Interventions ADL retraining;Functional transfer training; Endurance training;Cognitive reorientation   Goal Expiration Date 10/25/19   OT Treatment Day 1   OT Frequency 3-5x/wk   Recommendation   OT Discharge Recommendation Short Term Rehab   OT - OK to Discharge   (when medically cleared)   Daylin Barlow

## 2019-10-17 NOTE — SPEECH THERAPY NOTE
Speech Language/Pathology    Speech/Language Pathology Progress Note    Patient Name: Melida Real  Today's Date: 10/17/2019     Attempted to see pt for f/u dysphagia tx  Lunch tray bedside but pt working with PT/OT will attempt to re-visit at a later time            Eric SHEN  CCC-SLP  Speech-Language Pathologist  Available via [employer]   PA #[id number]  [place] #[id number]

## 2019-10-17 NOTE — ASSESSMENT & PLAN NOTE
Currently denies suicidal and homicidal ideation  Psychiatry is following    One-to-one was discontinued  -appreciate Psychiatry recommendations   -continue Zyprexa 2 5 mg

## 2019-10-17 NOTE — PLAN OF CARE
Problem: PHYSICAL THERAPY ADULT  Goal: Performs mobility at highest level of function for planned discharge setting  See evaluation for individualized goals  Description  Treatment/Interventions: Functional transfer training, LE strengthening/ROM, Therapeutic exercise, Endurance training, Cognitive reorientation, Gait training, Bed mobility, Spoke to nursing, OT          See flowsheet documentation for full assessment, interventions and recommendations  Outcome: Progressing  Note:   Prognosis: Good  Problem List: Decreased endurance, Impaired balance, Decreased mobility, Decreased coordination  Assessment: Pt is making steady progress with functional mobility  Limited by fatigue and L sided weakness  Min assist required for both with the use of a rolling walker and without  Pt does veer left with further distance during ambulation trials  Cues for hand placement with safety for transfers  Restorative tech present for stand by assist   Completed seated BLE exercises to conclude session  Chair alarm active post session  Pt would benefit from continued physical therapy to maximize functional independence  Recommendation: (Rehab)     PT - OK to Discharge: (S) Yes    See flowsheet documentation for full assessment

## 2019-10-18 ENCOUNTER — TELEPHONE (OUTPATIENT)
Dept: NEUROLOGY | Facility: CLINIC | Age: 44
End: 2019-10-18

## 2019-10-18 PROBLEM — I10 ESSENTIAL HYPERTENSION: Status: ACTIVE | Noted: 2019-10-18

## 2019-10-18 PROCEDURE — 99253 IP/OBS CNSLTJ NEW/EST LOW 45: CPT | Performed by: NURSE PRACTITIONER

## 2019-10-18 PROCEDURE — 99232 SBSQ HOSP IP/OBS MODERATE 35: CPT | Performed by: INTERNAL MEDICINE

## 2019-10-18 RX ORDER — LABETALOL 20 MG/4 ML (5 MG/ML) INTRAVENOUS SYRINGE
10 ONCE
Status: COMPLETED | OUTPATIENT
Start: 2019-10-18 | End: 2019-10-18

## 2019-10-18 RX ORDER — ACETAMINOPHEN 325 MG/1
650 TABLET ORAL EVERY 6 HOURS PRN
Status: DISCONTINUED | OUTPATIENT
Start: 2019-10-18 | End: 2019-10-19 | Stop reason: HOSPADM

## 2019-10-18 RX ORDER — METOCLOPRAMIDE HYDROCHLORIDE 5 MG/ML
10 INJECTION INTRAMUSCULAR; INTRAVENOUS ONCE
Status: COMPLETED | OUTPATIENT
Start: 2019-10-18 | End: 2019-10-18

## 2019-10-18 RX ORDER — MAGNESIUM SULFATE HEPTAHYDRATE 40 MG/ML
2 INJECTION, SOLUTION INTRAVENOUS ONCE
Status: COMPLETED | OUTPATIENT
Start: 2019-10-18 | End: 2019-10-19

## 2019-10-18 RX ADMIN — ACETAMINOPHEN 650 MG: 325 TABLET ORAL at 20:25

## 2019-10-18 RX ADMIN — LISINOPRIL 10 MG: 10 TABLET ORAL at 08:28

## 2019-10-18 RX ADMIN — METOCLOPRAMIDE 10 MG: 5 INJECTION, SOLUTION INTRAMUSCULAR; INTRAVENOUS at 23:59

## 2019-10-18 RX ADMIN — HYDRALAZINE HYDROCHLORIDE 10 MG: 20 INJECTION INTRAMUSCULAR; INTRAVENOUS at 20:25

## 2019-10-18 RX ADMIN — OLANZAPINE 2.5 MG: 2.5 TABLET, FILM COATED ORAL at 21:53

## 2019-10-18 RX ADMIN — METOCLOPRAMIDE 10 MG: 5 INJECTION, SOLUTION INTRAMUSCULAR; INTRAVENOUS at 00:11

## 2019-10-18 RX ADMIN — METHYLPREDNISOLONE 16 MG: 4 TABLET ORAL at 08:28

## 2019-10-18 RX ADMIN — AMLODIPINE BESYLATE 10 MG: 10 TABLET ORAL at 08:28

## 2019-10-18 RX ADMIN — NORTRIPTYLINE HYDROCHLORIDE 10 MG: 10 CAPSULE ORAL at 21:53

## 2019-10-18 RX ADMIN — LABETALOL 20 MG/4 ML (5 MG/ML) INTRAVENOUS SYRINGE 10 MG: at 23:58

## 2019-10-18 RX ADMIN — DIPHENHYDRAMINE HYDROCHLORIDE 25 MG: 50 INJECTION, SOLUTION INTRAMUSCULAR; INTRAVENOUS at 00:11

## 2019-10-18 NOTE — TELEPHONE ENCOUNTER
Scheduled 1/3 with Dr Peace Ayala in [facility] pass  Added to waitlist  Will follow up with in person

## 2019-10-18 NOTE — RESTORATIVE TECHNICIAN NOTE
Restorative Specialist Mobility Note       Activity: Ambulate in lerma, Ambulate in room, Bathroom privileges, Chair, Dangle, Stand at bedside(Educated/encouraged pt to ambulate with assistance 3-4 x's/day  Chair alarm on   Pt callbell, phone/tray within reach )     Assistive Device: None             Nga SANDERS, Restorative Technician, [employer]

## 2019-10-18 NOTE — TELEPHONE ENCOUNTER
----- Message from Frankie English PA-C sent at 10/17/2019  3:42 PM EDT -----  Regarding: HFU    Diagnosis/Reason for follow-up: IPH, Headaches  Subspecialty for follow-up: Vascular   Attending or AP?: Attending  Existing neurologist: None  Tests/Labs/Imaging ordered: MRI - 3 months

## 2019-10-18 NOTE — SOCIAL WORK
Received [employer] from [facility], [address] [place], who stated that pt has been accept and a bed is available tomorrow  CM informed pt's mom Wagner Hair of same  She was agreeable to provide pt with transportation to the rehab facility and will  pt at 12:30 pm tomorrow  [facility] message sent to [facility] to inform of same  Pt's bedside RN Chanelle Robledo, made aware of same

## 2019-10-18 NOTE — TELEPHONE ENCOUNTER
Provided new patient packet via in person  Patient has no questions or concerns at this time  Closing encounter

## 2019-10-18 NOTE — ASSESSMENT & PLAN NOTE
2/2 hemorrhagic stroke  Improving  Patient still has difficulties with fine motor movements in distal LUE and LLE     -Complete steroid taper   -PT/OT treat and eval  -acute rehab

## 2019-10-18 NOTE — PROGRESS NOTES
INTERNAL MEDICINE RESIDENCY PROGRESS NOTE     Name: Jennie Koroma   Age & Sex: 45 y o  male   MRN: [id number]  Unit/Bed#: [facility] 722-01   Encounter: [id number]  Team: SOD Team A    PATIENT INFORMATION     Name: Jennie Koroma   Age & Sex: 45 y o  male   MRN: [id number]  Hospital Stay Days: 4    ASSESSMENT/PLAN     Principal Problem:    Intraparenchymal hemorrhage of brain [facility])  Active Problems:    Depression    Left-sided weakness    Suicidal ideation    Migraine    Vertebral artery aneurysm (HCC)      * Intraparenchymal hemorrhage of brain [facility])  Assessment & Plan  Repeat CT head without contrast on 10/15 revealed interval expansion of right lentiform nucleus hematoma with increase in volume and surrounding edema with mass effect on right ventricular system  No herniation noted  Will also GCS 15  Repeat CT head on 10/16 demonstrates stable bleed  Improving neurologic examination  Continues to complain of left-sided weakness and sensory deficit   -Blood pressure control  -continue amlodipine 10 mg p o  Daily  -will add 10 mg lisinopril for this afternoon; continue every morning  -hydralazine p r n  for systolic blood pressure greater than 140      Depression  Assessment & Plan  Psychiatry following  appreciate recommendation    Vertebral artery aneurysm [facility])  Assessment & Plan  Identified on CTa 10/14  Stable   -control hypertension  -will require ASA 81 mg which was started on 10/21 per Neurosurgery recommendation  -follow-up CTA in 1 week    Migraine  Assessment & Plan  Patient has a history of migraines  In addition, likely has had prolonged, uncontrolled hypertension which may be playing a role      -Nortryptiline started as migraine ppx by night team   -avoid sumatriptan and NSAIDs in the setting of intracranial hemorrhage  -for future migraines inpatient, treat with IV magnesium 1 g, IV Reglan and IV Benadryl  -Outpatient neurology follow-up     Suicidal ideation  Assessment & Plan  Currently denies suicidal and homicidal ideation  Psychiatry is following  One-to-one was discontinued  -appreciate Psychiatry recommendations   -continue Zyprexa 2 5 mg    Left-sided weakness  Assessment & Plan  2/2 hemorrhagic stroke  Improving  Patient still has difficulties with fine motor movements in distal LUE and LLE    -PT/OT treat and eval  -acute rehab      Disposition: Medically stable for discharge to rehab      SUBJECTIVE     Patient seen and examined  Patient with migraine headache again  Provided with Benadryl and Reglan  Patient states therapy helped decrease the severity of the migraine  No other complaints at this time  Patient is optimistic about rehab  OBJECTIVE     Vitals:    10/17/19 2216 10/17/19 2224 10/18/19 0346 10/18/19 0807   BP: 147/91 147/91 142/84 150/79   BP Location:    Right arm   Pulse: 71  75 80   Resp:    18   Temp: 98 7 °F (37 1 °C)   98 4 °F (36 9 °C)   TempSrc:       SpO2: 97%  95% 96%   Weight:       Height:          Temperature:   Temp (24hrs), Av 6 °F (37 °C), Min:98 4 °F (36 9 °C), Max:98 7 °F (37 1 °C)    Temperature: 98 4 °F (36 9 °C)  Intake & Output:  I/O       10/16 07 - 10/17 0700 10/17 07 - 10/18 0700 10/18 07 - 10/19 0700    P  O  720 720     I V  (mL/kg)       Total Intake(mL/kg) 720 (10 4) 720 (10 4)     Urine (mL/kg/hr) 800 (0 5) 300 (0 2)     Total Output 800 300     Net -80 +420            Unmeasured Urine Occurrence  6 x         Weights:   IBW: 66 1 kg    Body mass index is 24 kg/m²  Weight (last 2 days)     Date/Time   Weight    10/16/19 1629   69 5 (153 22)            Physical Exam   Constitutional: He is oriented to person, place, and time  He appears well-developed and well-nourished  HENT:   Head: Normocephalic and atraumatic  Mouth/Throat: Oropharynx is clear and moist    Eyes: Pupils are equal, round, and reactive to light  EOM are normal    Neck: Normal range of motion  Neck supple  No JVD present     Cardiovascular: Normal rate, regular rhythm and normal heart sounds  Pulmonary/Chest: Effort normal and breath sounds normal  No respiratory distress  Abdominal: Soft  Bowel sounds are normal  He exhibits no distension  There is no tenderness  There is no guarding  Musculoskeletal: He exhibits no edema or tenderness  Neurological: He is alert and oriented to person, place, and time  He displays no tremor  A cranial nerve deficit and sensory deficit is present  Coordination abnormal  GCS eye subscore is 4  GCS verbal subscore is 5  GCS motor subscore is 6    4/5 LUE and LLE weakness    Nursing note and vitals reviewed  LABORATORY DATA     Labs: I have personally reviewed pertinent reports  Results from last 7 days   Lab Units 10/16/19  0511 10/15/19  0533 10/14/19  1500   WBC Thousand/uL 6 76  --  5 20   HEMOGLOBIN g/dL 14 7 15 2 15 3   HEMATOCRIT % 42 3 42 4 43 6   PLATELETS Thousands/uL 145*  --  147*   NEUTROS PCT % 67  --   --    MONOS PCT % 8  --   --       Results from last 7 days   Lab Units 10/16/19  0511 10/15/19  0533 10/14/19  1500   POTASSIUM mmol/L 3 5 3 6 3 6   CHLORIDE mmol/L 110* 104 105   CO2 mmol/L 26 23 28   BUN mg/dL 16 9 13   CREATININE mg/dL 1 10 0 87 1 18   CALCIUM mg/dL 8 8 8 8 9 5   ALK PHOS U/L  --   --  58   ALT U/L  --   --  26   AST U/L  --   --  19     Results from last 7 days   Lab Units 10/16/19  0511 10/15/19  0533   MAGNESIUM mg/dL 2 2 1 9     Results from last 7 days   Lab Units 10/16/19  0511 10/15/19  0533   PHOSPHORUS mg/dL 2 7 3 1      Results from last 7 days   Lab Units 10/14/19  1500   INR  1 03   PTT seconds 37         Results from last 7 days   Lab Units 10/14/19  1500   TROPONIN I ng/mL <0 03       IMAGING & DIAGNOSTIC TESTING     Radiology Results: I have personally reviewed pertinent reports  X-ray Chest 1 View Portable    Result Date: 10/14/2019  Impression: No acute cardiopulmonary disease   Workstation performed: [id number]     Ct Head Wo Contrast    Result Date: 10/16/2019  Impression: No significant interval change in size of a right basal ganglia intraparenchymal hematoma since 10/15/2019  No new areas of acute intracranial hemorrhage are identified  Workstation performed: [id number]     Ct Head Wo Contrast    Result Date: 10/16/2019  Impression: Interval expansion of the right lentiform nucleus hematoma with increase in volume from 9 4 mL 219 3 mL  There is slightly greater surrounding edema and mass effect on the right ventricular system and MCA cistern without herniation  Short-term follow-up evaluation is recommended to assess for stability  I personally discussed this study with Dr Mirella Kenny on 10/16/2019 at 8:29 AM  Workstation performed: [id number]     Mri Brain Wo Contrast    Result Date: 10/16/2019  Impression: Interval expansion of the right lentiform nucleus hematoma with hyperacute, acute and subacute blood products  The hematoma cavity demonstrates interval expansion when compared to the recent CT scan, now measuring 22 mL  Presumed etiology is hypertension however, correlation for underlying coagulopathy is recommended  There is mild surrounding edema and mass effect on the right ventricular system with asymmetric dilatation of the right temporal horn which may represent early entrapment  Follow-up CT evaluation is recommended  I personally discussed this study with Dr Mirella Kenny on 10/16/2019 at 8:13 AM  Workstation performed: [id number]     Ct Stroke Alert Brain    Result Date: 10/14/2019  Impression: Acute intraparenchymal hemorrhage centered at the right lentiform nucleus with mild surrounding vasogenic edema  No significant mass effect or midline shift  Findings were directly discussed with GRAHAM NAVARRO on 10/14/2019 2:59 PM  Workstation performed: [id number]     Cta Stroke Alert (head/neck)    Result Date: 10/14/2019  Impression: 1  Unremarkable CT angiogram of the brain  No CTA spot sign  2   Unremarkable CT angiogram of the neck     Findings were directly discussed with   Marybeth English on 10/14/2019 3:20 PM  Workstation performed: [id number]     Other Diagnostic Testing: I have personally reviewed pertinent reports  ACTIVE MEDICATIONS     Current Facility-Administered Medications   Medication Dose Route Frequency    acetaminophen (TYLENOL) tablet 650 mg  650 mg Oral Q6H PRN    amLODIPine (NORVASC) tablet 10 mg  10 mg Oral Daily    hydrALAZINE (APRESOLINE) injection 10 mg  10 mg Intravenous Q4H PRN    lisinopril (ZESTRIL) tablet 10 mg  10 mg Oral Daily    [START ON 10/19/2019] methylprednisolone (MEDROL) tablet 12 mg  12 mg Oral Daily    Followed by   Elnita Pipe ON 10/20/2019] methylprednisolone (MEDROL) tablet 8 mg  8 mg Oral Daily    Followed by   Elnita Pipe ON 10/21/2019] methylprednisolone (MEDROL) tablet 4 mg  4 mg Oral Daily    nortriptyline (PAMELOR) capsule 10 mg  10 mg Oral HS    OLANZapine (ZyPREXA) tablet 2 5 mg  2 5 mg Oral HS       VTE Pharmacologic Prophylaxis: Reason for no pharmacologic prophylaxis bleed  VTE Mechanical Prophylaxis: sequential compression device    Portions of the record may have been created with voice recognition software  Occasional wrong word or "sound a like" substitutions may have occurred due to the inherent limitations of voice recognition software    Read the chart carefully and recognize, using context, where substitutions have occurred   ==  Laroy Curling, MD  [address]  Internal Medicine Residency PGY-1

## 2019-10-18 NOTE — PROGRESS NOTES
Received appointment request  Reviewed chart, patient on stroke pathway  Scheduled patient 1/3/20 with Dr Justen Toney in [employer], also added to wait list for sooner appointment  Met with patient to discuss follow up care with outpatient neurology and stroke education  Explained nurse navigator role  Plan at this time is for patient to be discharged to inpatient rehab prior to returning home where he resides with his nephew  Provided patent a stroke education binder, my card, and new patient packet with appointment information  Patient verbalizes competency in stroke types, symptoms, risk factors and medications  Declines education at this time  States he will call/follow up with questions if needed  No questions or concerns at this time

## 2019-10-18 NOTE — SOCIAL WORK
Received [employer] from [place] Fuentes from [facility] who stated that they are unable to accept pt  CM contacted pt's mom Carlie Lesch, contact # [phone], to discuss pt's discharge plan  Informed her of denial from [facility]  Discussed other inpt acute rehab facilities  She stated that she will review the list with her family and inform CM of other rehab choices

## 2019-10-18 NOTE — SOCIAL WORK
Received phone call form [facility] liaison, who stated that she reviewed pt with MD and they feel that pt is appropriate for acute rehab, but they do not have a bed available and anticipate a bed being available late next week  Cm met with pt to discuss above and review alternate inpt acute rehab locations  He stated that he would be agreeable to [address] [place] [address], but wanted Cm to discuss same with his mom  He inquired about transportation to [place]  CM discussed BLS if he meets criteria, [facility] which is an out of pocket expense, and the option of family providing him with transportation  He requested CM to discuss transportation modes with his mom as well  CM contacted pt's mom Karol Philip, contact # [phone], to discuss above  Informed her that pt was agreeable to [facility]  She was agreeable to same  discussed transportation options  She stated that if pt is appropriate for her to transport him to inpt rehab, she would provide him with the transportation  [facility] referral sent to [employer] [facility]

## 2019-10-18 NOTE — QUICK NOTE
I was paged by the RN that pt has headache  I evaluated patient at bedside  His headache was similar in character to his headaches from previous nights  The pain was located behind his right eye, pain 3/10, non radiating  No FND on physical exam  Pt admitted that he received Benadryl and Reglan the previous night which helped his symptoms  Upon chart review, pt was recently started on Nortriptyline for migraine ppx but has not received it since his first dose is not due yet  We gave him Benadryl 25 mg IV + Reglan 10 mg IV  Will reassess him later for symptoms improvement

## 2019-10-18 NOTE — SOCIAL WORK
Received phone call from pt's mom Gregorio Lennon who stated that she spoke with pt and family and they prefer a referral to [facility] referral sent for same

## 2019-10-19 ENCOUNTER — HOSPITAL ENCOUNTER (INPATIENT)
Facility: HOSPITAL | Age: 44
LOS: 5 days | Discharge: HOME | DRG: 058 | End: 2019-10-24
Attending: PHYSICAL MEDICINE & REHABILITATION | Admitting: PHYSICAL MEDICINE & REHABILITATION
Payer: COMMERCIAL

## 2019-10-19 VITALS
HEART RATE: 71 BPM | HEIGHT: 67 IN | SYSTOLIC BLOOD PRESSURE: 147 MMHG | BODY MASS INDEX: 24.05 KG/M2 | TEMPERATURE: 98.4 F | OXYGEN SATURATION: 97 % | WEIGHT: 153.22 LBS | RESPIRATION RATE: 18 BRPM | DIASTOLIC BLOOD PRESSURE: 89 MMHG

## 2019-10-19 DIAGNOSIS — I61.9 INTRAPARENCHYMAL HEMORRHAGE OF BRAIN (HCC): ICD-10-CM

## 2019-10-19 DIAGNOSIS — G89.29 CHRONIC HEADACHE: ICD-10-CM

## 2019-10-19 DIAGNOSIS — I10 ESSENTIAL HYPERTENSION: Primary | ICD-10-CM

## 2019-10-19 DIAGNOSIS — I72.6 VERTEBRAL ARTERY ANEURYSM (HCC): ICD-10-CM

## 2019-10-19 DIAGNOSIS — R51.9 CHRONIC HEADACHE: ICD-10-CM

## 2019-10-19 PROBLEM — R45.851 SUICIDAL IDEATION: Status: RESOLVED | Noted: 2019-10-15 | Resolved: 2019-10-19

## 2019-10-19 PROBLEM — G43.909 MIGRAINES: Status: ACTIVE | Noted: 2019-10-19

## 2019-10-19 PROBLEM — D69.6 THROMBOCYTOPENIA (HCC): Status: ACTIVE | Noted: 2019-10-19

## 2019-10-19 PROBLEM — R00.1 JUNCTIONAL BRADYCARDIA: Status: ACTIVE | Noted: 2019-10-19

## 2019-10-19 PROCEDURE — 99255 IP/OBS CONSLTJ NEW/EST HI 80: CPT | Performed by: PHYSICAL MEDICINE & REHABILITATION

## 2019-10-19 PROCEDURE — 99238 HOSP IP/OBS DSCHRG MGMT 30/<: CPT | Performed by: INTERNAL MEDICINE

## 2019-10-19 RX ORDER — METHYLPREDNISOLONE 4 MG/1
4 TABLET ORAL DAILY
Status: COMPLETED | OUTPATIENT
Start: 2019-10-21 | End: 2019-10-21

## 2019-10-19 RX ORDER — METHYLPREDNISOLONE 4 MG/1
4 TABLET ORAL DAILY
Status: CANCELLED | OUTPATIENT
Start: 2019-10-21 | End: 2019-10-22

## 2019-10-19 RX ORDER — ACETAMINOPHEN, ASPIRIN AND CAFFEINE 250; 250; 65 MG/1; MG/1; MG/1
1 TABLET, FILM COATED ORAL EVERY 6 HOURS PRN
Qty: 30 TABLET | Refills: 0 | Status: ON HOLD | OUTPATIENT
Start: 2019-10-21 | End: 2019-10-23 | Stop reason: CLARIF

## 2019-10-19 RX ORDER — METHYLPREDNISOLONE 4 MG/1
4 TABLET ORAL DAILY
Qty: 1 TABLET | Refills: 0 | Status: ON HOLD | OUTPATIENT
Start: 2019-10-21 | End: 2019-10-23 | Stop reason: CLARIF

## 2019-10-19 RX ORDER — AMLODIPINE BESYLATE 10 MG/1
10 TABLET ORAL DAILY
Qty: 30 TABLET | Refills: 0 | Status: ON HOLD | OUTPATIENT
Start: 2019-10-20 | End: 2019-10-23 | Stop reason: CLARIF

## 2019-10-19 RX ORDER — OLANZAPINE 2.5 MG/1
2.5 TABLET ORAL
Status: CANCELLED | OUTPATIENT
Start: 2019-10-19

## 2019-10-19 RX ORDER — LISINOPRIL 20 MG/1
20 TABLET ORAL DAILY
Qty: 30 TABLET | Refills: 0 | Status: ON HOLD | OUTPATIENT
Start: 2019-10-20 | End: 2019-10-23 | Stop reason: CLARIF

## 2019-10-19 RX ORDER — AMLODIPINE BESYLATE 5 MG/1
10 TABLET ORAL DAILY
Status: DISCONTINUED | OUTPATIENT
Start: 2019-10-20 | End: 2019-10-24 | Stop reason: HOSPADM

## 2019-10-19 RX ORDER — AMLODIPINE BESYLATE 10 MG/1
10 TABLET ORAL DAILY
Status: CANCELLED | OUTPATIENT
Start: 2019-10-20

## 2019-10-19 RX ORDER — LISINOPRIL 20 MG/1
20 TABLET ORAL DAILY
Status: DISCONTINUED | OUTPATIENT
Start: 2019-10-20 | End: 2019-10-24 | Stop reason: HOSPADM

## 2019-10-19 RX ORDER — HYDRALAZINE HYDROCHLORIDE 10 MG/1
10 TABLET, FILM COATED ORAL EVERY 8 HOURS PRN
Status: DISCONTINUED | OUTPATIENT
Start: 2019-10-19 | End: 2019-10-24 | Stop reason: HOSPADM

## 2019-10-19 RX ORDER — LISINOPRIL 20 MG/1
20 TABLET ORAL DAILY
Status: CANCELLED | OUTPATIENT
Start: 2019-10-20

## 2019-10-19 RX ORDER — LISINOPRIL 20 MG/1
20 TABLET ORAL DAILY
Status: DISCONTINUED | OUTPATIENT
Start: 2019-10-20 | End: 2019-10-19 | Stop reason: HOSPADM

## 2019-10-19 RX ORDER — POLYETHYLENE GLYCOL 3350 17 G/17G
17 POWDER, FOR SOLUTION ORAL DAILY PRN
Status: DISCONTINUED | OUTPATIENT
Start: 2019-10-19 | End: 2019-10-24 | Stop reason: HOSPADM

## 2019-10-19 RX ORDER — NORTRIPTYLINE HYDROCHLORIDE 10 MG/1
10 CAPSULE ORAL
Qty: 30 CAPSULE | Refills: 0 | Status: ON HOLD | OUTPATIENT
Start: 2019-10-19 | End: 2019-10-23 | Stop reason: CLARIF

## 2019-10-19 RX ORDER — NORTRIPTYLINE HYDROCHLORIDE 10 MG/1
10 CAPSULE ORAL
Status: CANCELLED | OUTPATIENT
Start: 2019-10-19

## 2019-10-19 RX ORDER — ACETAMINOPHEN 325 MG/1
975 TABLET ORAL EVERY 8 HOURS PRN
Status: DISCONTINUED | OUTPATIENT
Start: 2019-10-19 | End: 2019-10-24 | Stop reason: HOSPADM

## 2019-10-19 RX ORDER — METHYLPREDNISOLONE 4 MG/1
8 TABLET ORAL DAILY
Status: COMPLETED | OUTPATIENT
Start: 2019-10-20 | End: 2019-10-20

## 2019-10-19 RX ORDER — OLANZAPINE 2.5 MG/1
2.5 TABLET ORAL
Status: DISCONTINUED | OUTPATIENT
Start: 2019-10-19 | End: 2019-10-22

## 2019-10-19 RX ORDER — ACETAMINOPHEN 325 MG/1
650 TABLET ORAL EVERY 6 HOURS PRN
Qty: 30 TABLET | Refills: 0 | Status: ON HOLD | OUTPATIENT
Start: 2019-10-19 | End: 2019-10-23 | Stop reason: CLARIF

## 2019-10-19 RX ORDER — OLANZAPINE 2.5 MG/1
2.5 TABLET ORAL
Qty: 30 TABLET | Refills: 0 | Status: ON HOLD | OUTPATIENT
Start: 2019-10-19 | End: 2019-10-23 | Stop reason: CLARIF

## 2019-10-19 RX ORDER — METHYLPREDNISOLONE 8 MG/1
8 TABLET ORAL DAILY
Qty: 1 TABLET | Refills: 0 | Status: ON HOLD | OUTPATIENT
Start: 2019-10-20 | End: 2019-10-23 | Stop reason: CLARIF

## 2019-10-19 RX ORDER — ACETAMINOPHEN 325 MG/1
650 TABLET ORAL EVERY 6 HOURS PRN
Status: CANCELLED | OUTPATIENT
Start: 2019-10-19

## 2019-10-19 RX ORDER — METHYLPREDNISOLONE 4 MG/1
8 TABLET ORAL DAILY
Status: CANCELLED | OUTPATIENT
Start: 2019-10-20 | End: 2019-10-21

## 2019-10-19 RX ORDER — ASPIRIN 81 MG/1
81 TABLET, CHEWABLE ORAL DAILY
Qty: 30 TABLET | Refills: 0 | Status: ON HOLD | OUTPATIENT
Start: 2019-10-21 | End: 2019-10-23 | Stop reason: CLARIF

## 2019-10-19 RX ORDER — NORTRIPTYLINE HYDROCHLORIDE 10 MG/1
10 CAPSULE ORAL
Status: DISCONTINUED | OUTPATIENT
Start: 2019-10-19 | End: 2019-10-24 | Stop reason: HOSPADM

## 2019-10-19 RX ADMIN — METHYLPREDNISOLONE 12 MG: 4 TABLET ORAL at 09:35

## 2019-10-19 RX ADMIN — OLANZAPINE 2.5 MG: 2.5 TABLET, FILM COATED ORAL at 21:02

## 2019-10-19 RX ADMIN — ACETAMINOPHEN 975 MG: 325 TABLET ORAL at 16:13

## 2019-10-19 RX ADMIN — MAGNESIUM SULFATE HEPTAHYDRATE 2 G: 40 INJECTION, SOLUTION INTRAVENOUS at 01:55

## 2019-10-19 RX ADMIN — NORTRIPTYLINE HYDROCHLORIDE 10 MG: 10 CAPSULE ORAL at 21:02

## 2019-10-19 RX ADMIN — POLYETHYLENE GLYCOL 3350 17 G: 17 POWDER, FOR SOLUTION ORAL at 16:22

## 2019-10-19 RX ADMIN — LISINOPRIL 10 MG: 10 TABLET ORAL at 09:32

## 2019-10-19 RX ADMIN — AMLODIPINE BESYLATE 10 MG: 10 TABLET ORAL at 09:33

## 2019-10-19 NOTE — ASSESSMENT & PLAN NOTE
- chronic almost daily headache since 2012   - does not recall any head trauma at that time  - without migraneous features appears to be more tension type  - completed short course of steroids   - started on zyprexa for mood d/o in acute care by psych but on 10/22 d/w Dr Kamilah Hernández of psych and recommended dc'ing zyprexa and has cleared patient from psych standpoint to be off of psych meds from psych standpoint  - stated on lisinopril for HTN in acute care by IM   - started on nortriptyline for headaches as well in acute care by neuro service   - has tried topomax and propranolol in the remote past which were not effective however dose and length of treatment are unknown    - prn reglan (was receiving reglan in acute care and patient tolerated w/o AE)   - neuro in acute care recommended repeat MRI in approximately 3 months as OP (e-prescribed by neuro team in EMR)  - OP neuro FU (in acute care FU was arranged with Dr Buzz Goldmann for 1/3/20)

## 2019-10-19 NOTE — PLAN OF CARE
Problem: Prexisting or High Potential for Compromised Skin Integrity  Goal: Skin integrity is maintained or improved  Description  INTERVENTIONS:  - Identify patients at risk for skin breakdown  - Assess and monitor skin integrity  - Assess and monitor nutrition and hydration status  - Monitor labs   - Assess for incontinence   - Turn and reposition patient  - Assist with mobility/ambulation  - Relieve pressure over bony prominences  - Avoid friction and shearing  - Provide appropriate hygiene as needed including keeping skin clean and dry  - Evaluate need for skin moisturizer/barrier cream  - Collaborate with interdisciplinary team   - Patient/family teaching  - Consider wound care consult   Outcome: Adequate for Discharge     Problem: Potential for Falls  Goal: Patient will remain free of falls  Description  INTERVENTIONS:  - Assess patient frequently for physical needs  -  Identify cognitive and physical deficits and behaviors that affect risk of falls  -  [place] fall precautions as indicated by assessment   - Educate patient/family on patient safety including physical limitations  - Instruct patient to call for assistance with activity based on assessment  - Modify environment to reduce risk of injury  - Consider OT/PT consult to assist with strengthening/mobility  Outcome: Adequate for Discharge     Problem: Neurological Deficit  Goal: Neurological status is stable or improving  Description  Interventions:  - Monitor and assess patient's level of consciousness, motor function, sensory function, and level of assistance needed for ADLs  - Monitor and report changes from baseline  Collaborate with interdisciplinary team to initiate plan and implement interventions as ordered  - Provide and maintain a safe environment  - Consider seizure precautions  - Consider fall precautions  - Consider aspiration precautions  - Consider bleeding precautions  Outcome: Adequate for Discharge     Problem:  Activity Intolerance/Impaired Mobility  Goal: Mobility/activity is maintained at optimum level for patient  Description  Interventions:  - Assess and monitor patient  barriers to mobility and need for assistive/adaptive devices  - Assess patient's emotional response to limitations  - Collaborate with interdisciplinary team and initiate plans and interventions as ordered  - Encourage independent activity per ability   - Maintain proper body alignment  - Perform active/passive rom as tolerated/ordered  - Plan activities to conserve energy   - Turn patient as appropriate  Outcome: Adequate for Discharge     Problem: Communication Impairment  Goal: Ability to express needs and understand communication  Description  Assess patient's communication skills and ability to understand information  Patient will demonstrate use of effective communication techniques, alternative methods of communication and understanding even if not able to speak  - Encourage communication and provide alternate methods of communication as needed  - Collaborate with case management/ for discharge needs  - Include patient/family/caregiver in decisions related to communication  Outcome: Adequate for Discharge     Problem: Potential for Aspiration  Goal: Non-ventilated patient's risk of aspiration is minimized  Description  Assess and monitor vital signs, respiratory status, and labs (WBC)  Monitor for signs of aspiration (tachypnea, cough, rales, wheezing, cyanosis, fever)  - Assess and monitor patient's ability to swallow  - Place patient up in chair to eat if possible  - HOB up at 90 degrees to eat if unable to get patient up into chair   - Supervise patient during oral intake  - Instruct patient/ family to take small bites  - Instruct patient/ family to take small single sips when taking liquids    - Follow patient-specific strategies generated by speech pathologist   Outcome: Adequate for Discharge     Problem: Nutrition  Goal: Nutrition/Hydration status is improving  Description  Monitor and assess patient's nutrition/hydration status for malnutrition (ex- brittle hair, bruises, dry skin, pale skin and conjunctiva, muscle wasting, smooth red tongue, and disorientation)  Collaborate with interdisciplinary team and initiate plan and interventions as ordered  Monitor patient's weight and dietary intake as ordered or per policy  Utilize nutrition screening tool and intervene per policy  Determine patient's food preferences and provide high-protein, high-caloric foods as appropriate  - Assist patient with eating   - Allow adequate time for meals   - Encourage patient to take dietary supplement as ordered  - Collaborate with clinical nutritionist   - Include patient/family/caregiver in decisions related to nutrition    Outcome: Adequate for Discharge     Problem: NEUROSENSORY - ADULT  Goal: Achieves stable or improved neurological status  Description  INTERVENTIONS  - Monitor and report changes in neurological status  - Monitor vital signs such as temperature, blood pressure, glucose, and any other labs ordered   - Monitor for seizure activity and implement precautions if appropriate       Outcome: Adequate for Discharge  Goal: Remains free of injury related to seizures activity  Description  INTERVENTIONS  - Maintain airway, patient safety  and administer oxygen as ordered  - Monitor patient for seizure activity, document and report duration and description of seizure to physician/advanced practitioner  - If seizure occurs,  ensure patient safety during seizure  - Reorient patient post seizure  - Instruct patient/family to notify RN of any seizure activity including if an aura is experienced  - Instruct patient/family to call for assistance with activity based on nursing assessment     Outcome: Adequate for Discharge  Goal: Achieves maximal functionality and self care  Description  INTERVENTIONS  - Monitor swallowing and airway patency with patient fatigue and changes in neurological status  - Encourage and assist patient to increase activity and self care     - Encourage visually impaired, hearing impaired and aphasic patients to use assistive/communication devices  Outcome: Adequate for Discharge     Problem: CARDIOVASCULAR - ADULT  Goal: Maintains optimal cardiac output and hemodynamic stability  Description  INTERVENTIONS:  - Monitor I/O, vital signs and rhythm  - Monitor for S/S and trends of decreased cardiac output  - Administer and titrate ordered vasoactive medications to optimize hemodynamic stability  - Assess quality of pulses, skin color and temperature  - Assess for signs of decreased coronary artery perfusion  - Instruct patient to report change in severity of symptoms  Outcome: Adequate for Discharge  Goal: Absence of cardiac dysrhythmias or at baseline rhythm  Description  INTERVENTIONS:  - Continuous cardiac monitoring, vital signs, obtain 12 lead EKG if ordered  - Administer antiarrhythmic and heart rate control medications as ordered  - Monitor electrolytes and administer replacement therapy as ordered  Outcome: Adequate for Discharge     Problem: COPING  Goal: Pt/Family able to verbalize concerns and demonstrate effective coping strategies  Description  INTERVENTIONS:  - Assist patient/family to identify coping skills, available support systems and cultural and spiritual values  - Provide emotional support, including active listening and acknowledgement of concerns of patient and caregivers  - Reduce environmental stimuli, as able  - Provide patient education  - Assess for spiritual pain/suffering and initiate spiritual care, including notification of Pastoral Care or angelia based community as needed  - Assess effectiveness of coping strategies  Outcome: Adequate for Discharge  Goal: Will report anxiety at manageable levels  Description  INTERVENTIONS:  - Administer medication as ordered  - Teach and encourage coping skills  - Provide emotional support  - Assess patient/family for anxiety and ability to cope  Outcome: Adequate for Discharge     Problem: CONFUSION/THOUGHT DISTURBANCE  Goal: Thought disturbances (confusion, delirium, depression, dementia or psychosis) are managed to maintain or return to baseline mental status and functional level  Description  INTERVENTIONS:  - Assess for possible contributors to  thought disturbance, including but not limited to medications, infection, impaired vision or hearing, underlying metabolic abnormalities, dehydration, respiratory compromise,  psychiatric diagnoses and notify attending PHYSICAN/AP  - Monitor and intervene to maintain adequate nutrition, hydration, elimination, sleep and activity  - Decrease environmental stimuli, including noise as appropriate  - Provide frequent contacts to provide refocusing, direction and reassurance as needed  Approach patient calmly with eye contact and at their level  - [place] high risk fall precautions, aspiration precautions and other safety measures, as indicated  - If delirium suspected, notify physician/AP of change in condition and request immediate in-person evaluation  - Pursue consults as appropriate including Geriatric (campus dependent), OT for cognitive evaluation/activity planning, psychiatric, pastoral care, etc   Outcome: Adequate for Discharge     Problem: Nutrition/Hydration-ADULT  Goal: Nutrient/Hydration intake appropriate for improving, restoring or maintaining nutritional needs  Description  Monitor and assess patient's nutrition/hydration status for malnutrition  Collaborate with interdisciplinary team and initiate plan and interventions as ordered  Monitor patient's weight and dietary intake as ordered or per policy  Utilize nutrition screening tool and intervene as necessary  Determine patient's food preferences and provide high-protein, high-caloric foods as appropriate       INTERVENTIONS:  - Monitor oral intake, urinary output, labs, and treatment plans  - Assess nutrition and hydration status and recommend course of action  - Evaluate amount of meals eaten  - Assist patient with eating if necessary   - Allow adequate time for meals  - Recommend/ encourage appropriate diets, oral nutritional supplements, and vitamin/mineral supplements  - Order, calculate, and assess calorie counts as needed  - Recommend, monitor, and adjust tube feedings and TPN/PPN based on assessed needs  - Assess need for intravenous fluids  - Provide specific nutrition/hydration education as appropriate  - Include patient/family/caregiver in decisions related to nutrition  Outcome: Adequate for Discharge     Problem: PAIN - ADULT  Goal: Verbalizes/displays adequate comfort level or baseline comfort level  Description  Interventions:  - Encourage patient to monitor pain and request assistance  - Assess pain using appropriate pain scale  - Administer analgesics based on type and severity of pain and evaluate response  - Implement non-pharmacological measures as appropriate and evaluate response  - Notify physician/advanced practitioner if interventions unsuccessful or patient reports new pain   Outcome: Adequate for Discharge     Problem: INFECTION - ADULT  Goal: Absence or prevention of progression during hospitalization  Description  INTERVENTIONS:  - Assess and monitor for signs and symptoms of infection  - Monitor lab/diagnostic results  - Monitor all insertion sites, i e  indwelling lines, tubes, and drains  - Administer medications as ordered  - Instruct and encourage patient and family to use good hand hygiene technique  - Identify and instruct in appropriate isolation precautions for identified infection/condition   Outcome: Adequate for Discharge     Problem: SAFETY ADULT  Goal: Patient will remain free of falls  Description  INTERVENTIONS:  - Assess patient frequently for physical needs  -  Identify cognitive and physical deficits and behaviors that affect risk of falls    -  [place] fall precautions as indicated by assessment   - Educate patient/family on patient safety including physical limitations  - Instruct patient to call for assistance with activity based on assessment  - Modify environment to reduce risk of injury  - Consider OT/PT consult to assist with strengthening/mobility  Outcome: Adequate for Discharge  Goal: Maintain or return mobility status to optimal level  Description  INTERVENTIONS:  - Assess patient's baseline mobility status (ambulation, transfers, stairs, etc )    - Identify cognitive and physical deficits and behaviors that affect mobility  - Identify mobility aids required to assist with transfers and/or ambulation (gait belt, sit-to-stand, lift, walker, cane, etc )  - [place] fall precautions as indicated by assessment  - Record patient progress and toleration of activity level on Mobility SBAR; progress patient to next Phase/Stage  - Instruct patient to call for assistance with activity based on assessment  - Consider rehabilitation consult to assist with strengthening/weightbearing, etc   Outcome: Adequate for Discharge     Problem: DISCHARGE PLANNING  Goal: Discharge to home or other facility with appropriate resources  Description  INTERVENTIONS:  - Identify barriers to discharge w/patient and caregiver  - Arrange for needed discharge resources and transportation as appropriate  - Identify discharge learning needs (meds, wound care, etc )  - Arrange for interpretive services to assist at discharge as needed  - Refer to Case Management Department for coordinating discharge planning if the patient needs post-hospital services based on physician/advanced practitioner order or complex needs related to functional status, cognitive ability, or social support system  Outcome: Adequate for Discharge     Problem: Knowledge Deficit  Goal: Patient/family/caregiver demonstrates understanding of disease process, treatment plan, medications, and discharge instructions  Description  Complete learning assessment and assess knowledge base    Interventions:  - Provide teaching at level of understanding  - Provide teaching via preferred learning methods  Outcome: Adequate for Discharge

## 2019-10-19 NOTE — ASSESSMENT & PLAN NOTE
- mild at 145 ()  - IM monitoring and has cleared patient for dc with recommendation for OP FU with PCP with further testing/treatment and/or specialist referral at PCP's discretion

## 2019-10-19 NOTE — DISCHARGE SUMMARY
INTERNAL MEDICINE RESIDENCY DISCHARGE SUMMARY     Hany Canales   45 y o  male  MRN: [id number]  Room/Bed: [facility] 722/[facility] 722-01     [address] [place] [address]   Encounter: [id number]    Principal Problem:    Intraparenchymal hemorrhage of brain [facility])  Active Problems:    Depression    Left-sided weakness    Migraine    Vertebral artery aneurysm [facility])    Essential hypertension      * Intraparenchymal hemorrhage of brain [facility])  Assessment & Plan  Repeat CT head without contrast on 10/15 revealed interval expansion of right lentiform nucleus hematoma with increase in volume and surrounding edema with mass effect on right ventricular system  No herniation noted  Will also GCS 15  Repeat CT head on 10/16 demonstrates stable bleed  Improving neurologic examination  Continues to complain of left-sided weakness and sensory deficit   -Blood pressure control  -continue amlodipine 10 mg p o  Daily  -Lisinopril 20 mg daily every morning      Depression  Assessment & Plan  Psychiatry following  appreciate recommendation    Essential hypertension  Assessment & Plan  Patients with continued elevated systolic blood pressures in 150s  -Continue amlodipine 10   -Continue lisinopril 20     Vertebral artery aneurysm [facility])  Assessment & Plan  Identified on CTa 10/14  Stable   -control hypertension  -will require ASA 81 mg which was started on 10/21 per Neurosurgery recommendation  -follow-up CTA in 1 week    Migraine  Assessment & Plan  Patient has a history of migraines  In addition, likely has had prolonged, uncontrolled hypertension which may be playing a role      -Nortryptiline started as migraine ppx by night team; switch to amitriptyline 20 mg q hs  -lisinopril  has been shown to be an effective migraine prophylaxis as well; continue lisinopril 20 qd   -avoid sumatriptan and NSAIDs in the setting of intracranial hemorrhage; for abortive therapy, tylenol 650mg q6h PRN until 10/21 when patient can safely take Excedrin PRN   -Outpatient neurology follow-up     Left-sided weakness  Assessment & Plan  2/2 hemorrhagic stroke  Improving  Patient still has difficulties with fine motor movements in distal LUE and LLE  -Complete steroid taper   -PT/OT treat and eval  -acute rehab    Suicidal ideationresolved as of 10/19/2019  Assessment & Plan  Currently denies suicidal and homicidal ideation  Psychiatry is following  One-to-one was discontinued  -appreciate Psychiatry recommendations   -continue Zyprexa 2 5 mg    [address] COURSE     45 y o with chronic migraine headaches and depression treated with citalopram presents as a transfer from [facility]  Patient originally presented for evaluation of acute left-sided facial weakness, left arm weakness  This occurred while lifting weights earlier that day  At the time, vital Signs significant for blood pressure of 208/129 otherwise stable  CT head demonstrated acute intraparenchymal hemorrhage centered at the right lentiform nucleus with surrounding vasogenic edema  NIH stroke scale score of 2, ICH score of 0  Patient was transferred to [facility] for management of intra parenchymal brain hemorrhage  Upon arrival, patient was alert and oriented x4 and saturating well on room air  Endorsed right eye pain and drowsiness; denied headaches, fevers, chills, vision changes, lightheadedness, dizziness, chest pain, and shortness of breath  Physical examination was significant for bilateral left visual field loss, complete loss of sensation over left face; left facial droop, inability to deviate tongue towards right side, flaccid paralysis of left upper extremity with complete loss of sensation, 3/5 left lower extremity extremity strength with complete loss of sensation, and left patellar hyperreflexia  Patient was subsequently started on nicardipine drip with Q1 neuro checks   Upon chart review, patient was also noted to have experienced suicidal ideation over the last three months, prompting inpatient psychiatry consultation and continuous 1-to-1 observation (seen and evaluate; 1-to-1 discontinued and started on zyprexa for headaches and depression)  Over the next two days patient exhibited progressive clinical improvement with nearly full recovery of strength in left upper and lower extremity, however repeat imaging indicated that initial hematoma had undergone interval increase in size  Patient was deemed stable for transfer out of the critical care unit the morning of 10/16/2019, however unit transfer was postponed until the afternoon pending results of repeat CT head  Once out of ICU, patient endorsed improvement of focal neurologic deficits  Psychiatry evaluated patient and he was more euthymic resulting in discontinuation of 1 to 1 once patient denied further suicidal ideation, plan, or intent  Patient continued to have migraines, especially overnight, which improved with IV Bendaryl, IV Reglan, and IV MgSO4  Patient also continued to have systolic pressures >957 requiring IV hydralazine  He was started on lisinopril 10 mg  For migraine headaches, patient was started on nortriptyline for prophylaxis  Prior to discharge, lisinopril increased to 20 mg  Patient will continue both amlodipine and lisinopril for hypertension management  Neurosurgery recommends CTA within the next week, start 81 mg ASA qd on 10/21  Continue migraine ppx with nortriptyline  For migraine abortive therapy, tyelenol 650 mg until 10/21 when Excedrin can be restarted as needed  Neurology outpatient follow-up and PCP follow-up within the month  Vitals:    10/19/19 0825   BP: 147/89   Pulse:    Resp: 18   Temp: 98 4 °F (36 9 °C)   SpO2:      Physical Exam   Constitutional: He is oriented to person, place, and time  He appears well-developed and well-nourished  HENT:   Head: Normocephalic and atraumatic  Eyes: Pupils are equal, round, and reactive to light   EOM are normal    Neck: No JVD present  Cardiovascular: Normal rate, regular rhythm and normal heart sounds  Pulmonary/Chest: Effort normal and breath sounds normal    Abdominal: Soft  Bowel sounds are normal  He exhibits no distension  There is no tenderness  Musculoskeletal: Normal range of motion  He exhibits no tenderness  Neurological: He is alert and oriented to person, place, and time  Stable neurologic exam   Nursing note and vitals reviewed  DISCHARGE INFORMATION     PCP at Discharge: [facility]    Admitting Provider: Maury Barillas DO  Admission Date: 10/14/2019    Discharge Provider: Maury Barillas DO  Discharge Date: 10/19/19    Discharge Disposition: Discharge/Transfer to not defined Healthcare Facility  Discharge Condition: good  Discharge with Lines: no    Discharge Diet: regular diet  Activity Restrictions: none  Test Results Pending at Discharge: none    Discharge Diagnoses:  Principal Problem:    Intraparenchymal hemorrhage of brain [facility])  Active Problems:    Depression    Left-sided weakness    Migraine    Vertebral artery aneurysm ([facility] [address] )    Essential hypertension  Resolved Problems:    Suicidal ideation      Consulting Providers:      Diagnostic & Therapeutic Procedures Performed:  X-ray Chest 1 View Portable    Result Date: 10/14/2019  Impression: No acute cardiopulmonary disease  Workstation performed: [id number]     Ct Head Wo Contrast    Result Date: 10/16/2019  Impression: No significant interval change in size of a right basal ganglia intraparenchymal hematoma since 10/15/2019  No new areas of acute intracranial hemorrhage are identified  Workstation performed: [id number]     Ct Head Wo Contrast    Result Date: 10/16/2019  Impression: Interval expansion of the right lentiform nucleus hematoma with increase in volume from 9 4 mL 219 3 mL  There is slightly greater surrounding edema and mass effect on the right ventricular system and MCA cistern without herniation  Short-term follow-up evaluation is recommended to assess for stability  I personally discussed this study with Dr Coby Chapman on 10/16/2019 at 8:29 AM  Workstation performed: [id number]     Mri Brain Wo Contrast    Result Date: 10/16/2019  Impression: Interval expansion of the right lentiform nucleus hematoma with hyperacute, acute and subacute blood products  The hematoma cavity demonstrates interval expansion when compared to the recent CT scan, now measuring 22 mL  Presumed etiology is hypertension however, correlation for underlying coagulopathy is recommended  There is mild surrounding edema and mass effect on the right ventricular system with asymmetric dilatation of the right temporal horn which may represent early entrapment  Follow-up CT evaluation is recommended  I personally discussed this study with Dr Coby Chapman on 10/16/2019 at 8:13 AM  Workstation performed: [id number]     Ct Stroke Alert Brain    Result Date: 10/14/2019  Impression: Acute intraparenchymal hemorrhage centered at the right lentiform nucleus with mild surrounding vasogenic edema  No significant mass effect or midline shift  Findings were directly discussed with GRHAAM NAVARRO on 10/14/2019 2:59 PM  Workstation performed: [id number]     Cta Stroke Alert (head/neck)    Result Date: 10/14/2019  Impression: 1  Unremarkable CT angiogram of the brain  No CTA spot sign  2   Unremarkable CT angiogram of the neck     Findings were directly discussed with Dr Raul Kendall on 10/14/2019 3:20 PM  Workstation performed: [id number]       Code Status: Level 1 - Full Code  Advance Directive & Living Will: <no information>  Power of :    POLST:      Medications:  Current Discharge Medication List        Current Discharge Medication List        Current Discharge Medication List      CONTINUE these medications which have NOT CHANGED    Details   aspirin-acetaminophen-caffeine (EXCEDRIN MIGRAINE) [phone] MG per tablet Take 2 tablets by mouth every 6 (six) hours as needed for headaches      citalopram (CeleXA) 20 mg tablet Take 20 mg by mouth daily             Allergies: Allergies   Allergen Reactions    Milk-Related Compounds Throat Swelling    Other      cats    Eggs Or Egg-Derived Products     Wheat Bran      Eats it sometimes         FOLLOW-UP     PCP Outpatient Follow-up:  yes      Follow up: [address]   Follow up within next: 3 weeks     Consulting Providers Follow-up:  yes      Physician name: Neurology  Follow up within next: 4 weeks    Active Issues Requiring Follow-up:   yes     Issue: PCP and neurology  What is Needed: Establish care  Follow-up Appointments Arranged: No      Discharge Statement:   I spent 30 minutes minutes discharging the patient  This time was spent on the day of discharge  I had direct contact with the patient on the day of discharge  Additional documentation is required if more than 30 minutes were spent on discharge  Portions of the record may have been created with voice recognition software  Occasional wrong word or "sound a like" substitutions may have occurred due to the inherent limitations of voice recognition software    Read the chart carefully and recognize, using context, where substitutions have occurred     ==  Dolly Cloud MD  [address]  Internal Medicine Resident PGY-1

## 2019-10-19 NOTE — ASSESSMENT & PLAN NOTE
- rt BG   - non-op management per neurosx  - BP control   - FU CTA ordered per neurosx request which revealed outpouching of the right vertebral artery and slightly evolving Rt BG hemorrhage; neurosx reviewed CTA and recommend patient be started on ASA 81 mg qd for right vertebral artery aneurysm and FU with CTA in approximally 3 months with OP FU with them at that time (CTA e-prescribed in EMR by neurosx team), neurosx has no further recommendations for ICH and recommends OP FU with neurology   - OP FU with Dr Ilan Leal (neurosx) on 1/24/20 and Dr Yocasta Rodriguez (neurology) on 1/3/20

## 2019-10-19 NOTE — PROGRESS NOTES
Cholo garrison at this time, medical consult to follow pt , aware of elevated BP's   Pt asymptomatic at this time  Tylenol given earlier was effectual for headache pain relief  SCD's maintained  See BP's recorded  In no distress  Continue same plan of care

## 2019-10-19 NOTE — PROGRESS NOTES
10/19/19 1437   Charting Type   Charting Type Shift assessment   Neurological   Neuro (WDL) X   Level of Consciousness Alert/awake   Orientation Level Oriented X4   Cognition Follows commands   Extraocular Movements Full   R Pupil Size (mm) 3   L Pupil Size (mm) 3   LUE Motor Response Responds to commands   LUE Sensation Full sensation   LUE Muscle Strength 4- Movement against gravity and limited resistance   LLE Motor Response Responds to commands   LLE Sensation Full sensation   LLE Muscle Strength 4- Movement against gravity and limited resistance   Neuro Symptoms   (headache)   Relieved by Rest   Neuro Additional Assessments No   Reflexes   Cough Present   [place] Coma Scale   Eye Opening 4   Best Verbal Response 5   Best Motor Response 6   Jacob Coma Scale Score 15   HEENT   HEENT (WDL) X   Head and Face Asymmetrical   L Eye Blurred   Vision - Corrective Lenses (at bedside) Glasses   R Ear Intact   L Ear Intact   Respiratory   Respiratory (WDL) WDL   Respiratory Pattern Normal   Chest Assessment Chest expansion symmetrical   Bilateral Breath Sounds Clear   Respiratory Interventions   Respiratory Interventions Cough and deep breathe   Cardiac   Cardiac (WDL) WDL   Cardiac Regularity Regular   Cardiac Symptoms None   Pacemaker/ICD No   Peripheral Vascular   Peripheral Vascular (WDL) WDL   RUE Neurovascular Assessment   R Radial Pulse +2   LUE Neurovascular Assessment   L Radial Pulse +2   RLE Neurovascular Assessment   R Pedal Pulse +2   LLE Neurovascular Assessment   L Pedal Pulse +2   Integumentary   Integumentary (WDL) WDL   Skin Condition/Temp Warm;Dry   2 RN Skin Assessment completed with Pedro Pablo Padron RN   Tattoos/Piercings   Does patient have tattoos?  Yes   Piercings Remaining No   Musculoskeletal   Musculoskeletal (WDL) X   Level of Assistance Contact guard assist, steadying assist   LUE Limited movement   LLE Limited movement   Gastrointestinal   Gastrointestinal (WDL) WDL   Stool Assessment Bowel Incontinence No   Genitourinary   Genitourinary (WDL) WDL   Urine Assessment   Urinary Incontinence No   Anal/Rectal   Anal/Rectal (WDL) WDL   Psychosocial   Psychosocial (WDL) X   Patient Behaviors/Mood Calm; Cooperative   Needs Expressed Physical   Ability to Express Feelings Able to express   Ability to Express Needs Able to express   Ability to Express Thoughts Able to express   Ability to Understand Others Understands

## 2019-10-19 NOTE — ASSESSMENT & PLAN NOTE
- started on lisinopril 20 mg qd and norvasc 10 mg qd in acute care--> IM managing and recommend patient be dc'd on this regimen

## 2019-10-19 NOTE — NURSING NOTE
Discharged in private transportation to [employer]  Attempted to call report, voicemail left per instructions

## 2019-10-19 NOTE — ASSESSMENT & PLAN NOTE
Patients with continued elevated systolic blood pressures in 150s     -Continue amlodipine 10   -Continue lisinopril 20

## 2019-10-19 NOTE — ASSESSMENT & PLAN NOTE
· Treatment per primary team   · Non-op management   · BP control will keep -150  · Continue with amlodipine 10 and lisinopril 20   · Add PRN hydralazine for now   · May need to adjust medications accordingly

## 2019-10-19 NOTE — PLAN OF CARE
Problem: Prexisting or High Potential for Compromised Skin Integrity  Goal: Skin integrity is maintained or improved  Description  INTERVENTIONS:  - Identify patients at risk for skin breakdown  - Assess and monitor skin integrity  - Assess and monitor nutrition and hydration status  - Monitor labs   - Assess for incontinence   - Turn and reposition patient  - Assist with mobility/ambulation  - Relieve pressure over bony prominences  - Avoid friction and shearing  - Provide appropriate hygiene as needed including keeping skin clean and dry  - Evaluate need for skin moisturizer/barrier cream  - Collaborate with interdisciplinary team   - Patient/family teaching  - Consider wound care consult   Outcome: Progressing     Problem: Potential for Falls  Goal: Patient will remain free of falls  Description  INTERVENTIONS:  - Assess patient frequently for physical needs  -  Identify cognitive and physical deficits and behaviors that affect risk of falls  -  [place] fall precautions as indicated by assessment   - Educate patient/family on patient safety including physical limitations  - Instruct patient to call for assistance with activity based on assessment  - Modify environment to reduce risk of injury  - Consider OT/PT consult to assist with strengthening/mobility  Outcome: Progressing     Problem: Activity Intolerance/Impaired Mobility  Goal: Mobility/activity is maintained at optimum level for patient  Description  Interventions:  - Assess and monitor patient  barriers to mobility and need for assistive/adaptive devices  - Assess patient's emotional response to limitations  - Collaborate with interdisciplinary team and initiate plans and interventions as ordered  - Encourage independent activity per ability   - Maintain proper body alignment  - Perform active/passive rom as tolerated/ordered    - Plan activities to conserve energy   - Turn patient as appropriate  Outcome: Progressing     Problem: Communication Impairment  Goal: Ability to express needs and understand communication  Description  Assess patient's communication skills and ability to understand information  Patient will demonstrate use of effective communication techniques, alternative methods of communication and understanding even if not able to speak  - Encourage communication and provide alternate methods of communication as needed  - Collaborate with case management/ for discharge needs  - Include patient/family/caregiver in decisions related to communication  Outcome: Progressing     Problem: Potential for Aspiration  Goal: Non-ventilated patient's risk of aspiration is minimized  Description  Assess and monitor vital signs, respiratory status, and labs (WBC)  Monitor for signs of aspiration (tachypnea, cough, rales, wheezing, cyanosis, fever)  - Assess and monitor patient's ability to swallow  - Place patient up in chair to eat if possible  - HOB up at 90 degrees to eat if unable to get patient up into chair   - Supervise patient during oral intake  - Instruct patient/ family to take small bites  - Instruct patient/ family to take small single sips when taking liquids  - Follow patient-specific strategies generated by speech pathologist   Outcome: Progressing     Problem: Nutrition  Goal: Nutrition/Hydration status is improving  Description  Monitor and assess patient's nutrition/hydration status for malnutrition (ex- brittle hair, bruises, dry skin, pale skin and conjunctiva, muscle wasting, smooth red tongue, and disorientation)  Collaborate with interdisciplinary team and initiate plan and interventions as ordered  Monitor patient's weight and dietary intake as ordered or per policy  Utilize nutrition screening tool and intervene per policy  Determine patient's food preferences and provide high-protein, high-caloric foods as appropriate  - Assist patient with eating   - Allow adequate time for meals    - Encourage patient to take dietary supplement as ordered  - Collaborate with clinical nutritionist   - Include patient/family/caregiver in decisions related to nutrition  Outcome: Progressing     Problem: NEUROSENSORY - ADULT  Goal: Achieves stable or improved neurological status  Description  INTERVENTIONS  - Monitor and report changes in neurological status  - Monitor vital signs such as temperature, blood pressure, glucose, and any other labs ordered   - Monitor for seizure activity and implement precautions if appropriate       Outcome: Progressing  Goal: Remains free of injury related to seizures activity  Description  INTERVENTIONS  - Maintain airway, patient safety  and administer oxygen as ordered  - Monitor patient for seizure activity, document and report duration and description of seizure to physician/advanced practitioner  - If seizure occurs,  ensure patient safety during seizure  - Reorient patient post seizure  - Instruct patient/family to notify RN of any seizure activity including if an aura is experienced  - Instruct patient/family to call for assistance with activity based on nursing assessment     Outcome: Progressing  Goal: Achieves maximal functionality and self care  Description  INTERVENTIONS  - Monitor swallowing and airway patency with patient fatigue and changes in neurological status  - Encourage and assist patient to increase activity and self care     - Encourage visually impaired, hearing impaired and aphasic patients to use assistive/communication devices  Outcome: Progressing     Problem: CARDIOVASCULAR - ADULT  Goal: Maintains optimal cardiac output and hemodynamic stability  Description  INTERVENTIONS:  - Monitor I/O, vital signs and rhythm  - Monitor for S/S and trends of decreased cardiac output  - Administer and titrate ordered vasoactive medications to optimize hemodynamic stability  - Assess quality of pulses, skin color and temperature  - Assess for signs of decreased coronary artery perfusion  - Instruct patient to report change in severity of symptoms  Outcome: Progressing  Goal: Absence of cardiac dysrhythmias or at baseline rhythm  Description  INTERVENTIONS:  - Continuous cardiac monitoring, vital signs, obtain 12 lead EKG if ordered  - Administer antiarrhythmic and heart rate control medications as ordered  - Monitor electrolytes and administer replacement therapy as ordered  Outcome: Progressing     Problem: COPING  Goal: Pt/Family able to verbalize concerns and demonstrate effective coping strategies  Description  INTERVENTIONS:  - Assist patient/family to identify coping skills, available support systems and cultural and spiritual values  - Provide emotional support, including active listening and acknowledgement of concerns of patient and caregivers  - Reduce environmental stimuli, as able  - Provide patient education  - Assess for spiritual pain/suffering and initiate spiritual care, including notification of Pastoral Care or angelia based community as needed  - Assess effectiveness of coping strategies  Outcome: Progressing  Goal: Will report anxiety at manageable levels  Description  INTERVENTIONS:  - Administer medication as ordered  - Teach and encourage coping skills  - Provide emotional support  - Assess patient/family for anxiety and ability to cope  Outcome: Progressing     Problem: CONFUSION/THOUGHT DISTURBANCE  Goal: Thought disturbances (confusion, delirium, depression, dementia or psychosis) are managed to maintain or return to baseline mental status and functional level  Description  INTERVENTIONS:  - Assess for possible contributors to  thought disturbance, including but not limited to medications, infection, impaired vision or hearing, underlying metabolic abnormalities, dehydration, respiratory compromise,  psychiatric diagnoses and notify attending PHYSICAN/AP  - Monitor and intervene to maintain adequate nutrition, hydration, elimination, sleep and activity  - Decrease environmental stimuli, including noise as appropriate  - Provide frequent contacts to provide refocusing, direction and reassurance as needed  Approach patient calmly with eye contact and at their level  - [place] high risk fall precautions, aspiration precautions and other safety measures, as indicated  - If delirium suspected, notify physician/AP of change in condition and request immediate in-person evaluation  - Pursue consults as appropriate including Geriatric (campus dependent), OT for cognitive evaluation/activity planning, psychiatric, pastoral care, etc   Outcome: Progressing     Problem: Nutrition/Hydration-ADULT  Goal: Nutrient/Hydration intake appropriate for improving, restoring or maintaining nutritional needs  Description  Monitor and assess patient's nutrition/hydration status for malnutrition  Collaborate with interdisciplinary team and initiate plan and interventions as ordered  Monitor patient's weight and dietary intake as ordered or per policy  Utilize nutrition screening tool and intervene as necessary  Determine patient's food preferences and provide high-protein, high-caloric foods as appropriate       INTERVENTIONS:  - Monitor oral intake, urinary output, labs, and treatment plans  - Assess nutrition and hydration status and recommend course of action  - Evaluate amount of meals eaten  - Assist patient with eating if necessary   - Allow adequate time for meals  - Recommend/ encourage appropriate diets, oral nutritional supplements, and vitamin/mineral supplements  - Order, calculate, and assess calorie counts as needed  - Recommend, monitor, and adjust tube feedings and TPN/PPN based on assessed needs  - Assess need for intravenous fluids  - Provide specific nutrition/hydration education as appropriate  - Include patient/family/caregiver in decisions related to nutrition  Outcome: Progressing     Problem: PAIN - ADULT  Goal: Verbalizes/displays adequate comfort level or baseline comfort level  Description  Interventions:  - Encourage patient to monitor pain and request assistance  - Assess pain using appropriate pain scale  - Administer analgesics based on type and severity of pain and evaluate response  - Implement non-pharmacological measures as appropriate and evaluate response  - Notify physician/advanced practitioner if interventions unsuccessful or patient reports new pain   Outcome: Progressing     Problem: INFECTION - ADULT  Goal: Absence or prevention of progression during hospitalization  Description  INTERVENTIONS:  - Assess and monitor for signs and symptoms of infection  - Monitor lab/diagnostic results  - Monitor all insertion sites, i e  indwelling lines, tubes, and drains  - Administer medications as ordered  - Instruct and encourage patient and family to use good hand hygiene technique  - Identify and instruct in appropriate isolation precautions for identified infection/condition   Outcome: Progressing     Problem: SAFETY ADULT  Goal: Patient will remain free of falls  Description  INTERVENTIONS:  - Assess patient frequently for physical needs  -  Identify cognitive and physical deficits and behaviors that affect risk of falls    -  [place] fall precautions as indicated by assessment   - Educate patient/family on patient safety including physical limitations  - Instruct patient to call for assistance with activity based on assessment  - Modify environment to reduce risk of injury  - Consider OT/PT consult to assist with strengthening/mobility  Outcome: Progressing  Goal: Maintain or return mobility status to optimal level  Description  INTERVENTIONS:  - Assess patient's baseline mobility status (ambulation, transfers, stairs, etc )    - Identify cognitive and physical deficits and behaviors that affect mobility  - Identify mobility aids required to assist with transfers and/or ambulation (gait belt, sit-to-stand, lift, walker, cane, etc )  - [place] fall precautions as indicated by assessment  - Record patient progress and toleration of activity level on Mobility SBAR; progress patient to next Phase/Stage  - Instruct patient to call for assistance with activity based on assessment  - Consider rehabilitation consult to assist with strengthening/weightbearing, etc   Outcome: Progressing     Problem: DISCHARGE PLANNING  Goal: Discharge to home or other facility with appropriate resources  Description  INTERVENTIONS:  - Identify barriers to discharge w/patient and caregiver  - Arrange for needed discharge resources and transportation as appropriate  - Identify discharge learning needs (meds, wound care, etc )  - Arrange for interpretive services to assist at discharge as needed  - Refer to Case Management Department for coordinating discharge planning if the patient needs post-hospital services based on physician/advanced practitioner order or complex needs related to functional status, cognitive ability, or social support system  Outcome: Progressing     Problem: Knowledge Deficit  Goal: Patient/family/caregiver demonstrates understanding of disease process, treatment plan, medications, and discharge instructions  Description  Complete learning assessment and assess knowledge base    Interventions:  - Provide teaching at level of understanding  - Provide teaching via preferred learning methods  Outcome: Progressing

## 2019-10-19 NOTE — CONSULTS
Consult- Emy Sarabia 1975, 45 y o  male MRN: [id number]    Unit/Bed#: -01 Encounter: [id number]    Primary Care Provider: Joelle Mortensen DO   Date and time admitted to hospital: 10/19/2019  2:09 PM      Inpatient consult to Internal Medicine  Consult performed by: KOFFI Ramsey  Consult ordered by: Pillo Siegel MD          * Intraparenchymal hemorrhage of brain [facility])  Assessment & Plan  · Treatment per primary team   · Non-op management   · BP control will keep -150  · Continue with amlodipine 10 and lisinopril 20   · Add PRN hydralazine for now   · May need to adjust medications accordingly     Migraines  Assessment & Plan  · Started on nortriptyline ppx  · Will avoid sumatriptan and NSAIDs     Thrombocytopenia ([facility] [address] )  Assessment & Plan  · Unclear etiology   · Will monitor closely   · No anticoagulation, NSAIDs due to recent intraparenchymal hemorrhage     Essential hypertension  Assessment & Plan  · Will continue with lisinopril and amlodipine   · Dose adjustment accordingly to keep SBP between 140-150  · PRN hydralazine       Recommendations for Discharge:  · Per Primary Service    History of Present Illness:  Emy Sarabia is a 45 y o  male who is originally admitted to the ARU service due to recent intraparenchymal bleed of the brain required further rehabilitation  We are consulted for medical management  The patient is a very pleasant 46-year-old male who originally presented to the hospital with left-sided facial droop and left arm weakness  The patient was transfer from [facility] to [place] after the finding of right BG hemorrhage with elevated BP 200s/100s  Neuro surgery evaluation was done and recommended non operative management with blood pressure control  There was a concern for suicidal ideation and Psychiatry evaluation was obtained  The patient was clear from Psychiatry and currently denies any suicidal ideation    He denies any chest pain, palpitation, nausea, vomiting, abdominal pain, or any urinary symptoms  The patient does have history of migraine headaches and was started with drip [place] for prophylaxis and currently denies any headache  Review of Systems:  Review of Systems   Constitutional: Negative for chills, fatigue and fever  HENT: Negative for congestion, ear pain, postnasal drip and sore throat  Eyes: Negative for discharge, itching and visual disturbance  Respiratory: Negative for cough, chest tightness and shortness of breath  Cardiovascular: Negative for chest pain, palpitations and leg swelling  Gastrointestinal: Negative for abdominal pain, nausea and vomiting  Endocrine: Negative for cold intolerance and heat intolerance  Genitourinary: Negative for difficulty urinating, dysuria and hematuria  Musculoskeletal: Negative for back pain, gait problem and neck pain  Skin: Negative for rash and wound  Neurological: Negative for dizziness, speech difficulty, weakness, light-headedness and headaches  Psychiatric/Behavioral: Negative for confusion and suicidal ideas  The patient is not nervous/anxious  Past Medical and Surgical History:   Past Medical History:   Diagnosis Date    Depression     Hypertension     Migraine     Stroke [facility])        Past Surgical History:   Procedure Laterality Date    LITHOTRIPSY         Meds/Allergies:  all medications and allergies reviewed    Allergies:    Allergies   Allergen Reactions    Milk-Related Compounds Throat Swelling    Other      cats    Eggs Or Egg-Derived Products     Wheat Bran      Eats it sometimes         Social History:     Marital Status: Single    Substance Use History:   Social History     Substance and Sexual Activity   Alcohol Use Not Currently     Social History     Tobacco Use   Smoking Status Never Smoker   Smokeless Tobacco Never Used     Social History     Substance and Sexual Activity   Drug Use Never       Family History:  I have reviewed the patients family history    Physical Exam:   Vitals:   Blood Pressure: 130/74 (10/20/19 0727)  Pulse: 73 (10/20/19 0727)  Temperature: (!) 97 4 °F (36 3 °C) (10/20/19 0727)  Temp Source: Temporal (10/20/19 0727)  Respirations: 16 (10/20/19 0727)  Height: 5' 7" (170 2 cm) (10/19/19 1447)  Weight - Scale: 67 8 kg (149 lb 6 oz) (10/19/19 1447)  SpO2: 98 % (10/20/19 0727)    Physical Exam   Constitutional: He is oriented to person, place, and time  He appears well-developed and well-nourished  No distress  HENT:   Head: Normocephalic and atraumatic  Mouth/Throat: Oropharynx is clear and moist    Eyes: Pupils are equal, round, and reactive to light  Conjunctivae and EOM are normal    Neck: Normal range of motion  Neck supple  No thyromegaly present  Cardiovascular: Normal rate, regular rhythm, normal heart sounds and intact distal pulses  Pulmonary/Chest: Effort normal and breath sounds normal  No respiratory distress  He has no wheezes  Abdominal: Soft  Bowel sounds are normal  He exhibits no distension  There is no tenderness  Musculoskeletal: He exhibits no edema or deformity  LUE +4/5    Neurological: He is alert and oriented to person, place, and time  He has normal reflexes  No cranial nerve deficit  Left sided facial droop     Skin: Skin is warm and dry  No erythema  Psychiatric: He has a normal mood and affect  His behavior is normal  Thought content normal    Vitals reviewed  Additional Data:   Lab Results: I have personally reviewed pertinent reports        Results from last 7 days   Lab Units 10/16/19  0511   WBC Thousand/uL 6 76   HEMOGLOBIN g/dL 14 7   HEMATOCRIT % 42 3   PLATELETS Thousands/uL 145*   NEUTROS PCT % 67   LYMPHS PCT % 25   MONOS PCT % 8   EOS PCT % 0     Results from last 7 days   Lab Units 10/16/19  0511  10/14/19  1500   POTASSIUM mmol/L 3 5   < > 3 6   CHLORIDE mmol/L 110*   < > 105   CO2 mmol/L 26   < > 28   BUN mg/dL 16   < > 13   CREATININE mg/dL 1 10   < > 1 18   CALCIUM mg/dL 8 8 < > 9 5   ALK PHOS U/L  --   --  58   ALT U/L  --   --  26   AST U/L  --   --  19    < > = values in this interval not displayed  Results from last 7 days   Lab Units 10/14/19  1500   INR  1 03     Results from last 7 days   Lab Units 10/14/19  1500   TROPONIN I ng/mL <0 03     Lab Results   Component Value Date/Time    HGBA1C 4 2 10/15/2019 05:33 AM     Results from last 7 days   Lab Units 10/14/19  1445   POC GLUCOSE mg/dl 89       Imaging: I have personally reviewed pertinent reports  No orders to display   CT head, MRI brain 10/16    EKG, Pathology, and Other Studies Reviewed on Admission:   · EKG: 10/16 junctional bradycardia HR 43    ** Please Note: This note has been constructed using a voice recognition system   **

## 2019-10-19 NOTE — H&P
PHYSICAL MEDICINE AND REHABILITATION H&P/ADMISSION NOTE  Phan Carrington 45 y o  male MRN: [id number]  Unit/Bed#: [facility] 215-01 Encounter: [id number]     Rehab Diagnosis: ICH    History of Present Illness:   Phan Carrington is a 45 y o  male who presented to the [facility] [address] with facial droop and L arm weakness and was found to have rt BG hemorrhage in the setting of elevated BP  Patient was managed non-operatively with BP control  There was concern for SI and psych did evaluate the patient and he was cleared from psych perspective  Subjective: d/w patient his headache history     Review of Systems: A 10-point review of systems was performed  Negative except as listed above      Plan:     Vertebral artery aneurysm [facility])  Assessment & Plan  - Rt vertebral artery aneurysm  - per neurosx patient to have repeat imaging and start ASA 81 mg qd if imaging stable  - will clarify with neurosx timeline to repeat imaging and starting ASA 81 mg qd     * Intraparenchymal hemorrhage of brain (HCC)  Assessment & Plan  - rt BG   - non-op management per neurosx  - BP control   - will clarify timeline of FU imaging   - OP FU with neurosx     Thrombocytopenia (HCC)  Assessment & Plan  - mild at 145 ()  - IM monitoring     Junctional bradycardia  Assessment & Plan  - not currently bradycardic     Essential hypertension  Assessment & Plan  - started on lisinopril and norvasc in acute care  - IM managing     Chronic headache  Assessment & Plan  - chronic almost daily headache since 2012   - does not recall any head trauma at that time  - without migraneous features appears to be more tension type  - started on steroid taper in acute care will be completed on 10/21  - started on zyprexa for mood d/o in acute care by psych  - stated on lisinopril for HTN in acute care by IM   - started on nortriptyline for headaches as well in acute care  - has tried topomax and propranolol in the past which were not effective however dose and length of treatment are unknown    - OP neuro FU     Depression  Assessment & Plan  - started on zyprexa 2 5 mg HS by psych  - OP FU with psych        Drug regimen reviewed, all potential adverse effects identified and addressed:    Scheduled Meds:  Current Facility-Administered Medications:  acetaminophen 975 mg Oral Q8H PRN MD Ming Padilla ON 10/20/2019] amLODIPine 10 mg Oral Daily MD Ming Padilla ON 10/20/2019] lisinopril 20 mg Oral Daily MD Ming Padilla ON 10/20/2019] methylPREDNISolone 8 mg Oral Daily Frankie Lyle MD   Followed by       Ming Gustafson ON 10/21/2019] methylPREDNISolone 4 mg Oral Daily Frankie Lyle MD   nortriptyline 10 mg Oral HS Frankie Lyle MD   OLANZapine 2 5 mg Oral HS Frankie Lyle MD   polyethylene glycol 17 g Oral Daily PRN Frankie Lyle MD          DVT ppx: SCDs only due to [address]       Functional History - Prior to Admission:      I PTA     Functional Status Upon Admission to [facility]:  Mobility: min  Transfers: min   ADLs: min     Hany Canales lives with their family  He lives in a(n) single family home  The living area: can live on one level  The patient will not have 24 hour supervision available upon discharge      Physical Exam:      General: alert, no apparent distress, cooperative and comfortable  HEENT:  Head: Normal, normocephalic, atraumatic    CARDIAC:  +S1/2  LUNGS:  respirations unlabored   ABDOMEN:  soft NT   EXTREMITIES:  volume status currently stable   NEURO:   mental status, speech normal, alert and oriented x3, sensation grossly normal and 4/5 throughout on MMT, mild dysmetria LUE on F-N testing, mild facial asymmetry otherwise CN grossly intact  PSYCH:  mood/affect currently stable        Laboratory:    Results from last 7 days   Lab Units 10/16/19  0511 10/15/19  0533 10/14/19  1500   HEMOGLOBIN g/dL 14 7 15 2 15 3   HEMATOCRIT % 42 3 42 4 43 6   WBC Thousand/uL 6 76  --  5 20     Results from last 7 days   Lab Units 10/16/19  0511 10/15/19  0533 10/14/19  1500   BUN mg/dL 16 9 13   SODIUM mmol/L 143 137 141   POTASSIUM mmol/L 3 5 3 6 3 6   CHLORIDE mmol/L 110* 104 105   CREATININE mg/dL 1 10 0 87 1 18   AST U/L  --   --  19   ALT U/L  --   --  26     Results from last 7 days   Lab Units 10/14/19  1500   PROTIME seconds 12 0   INR  1 03              Past Medical History:   Past Surgical History:   Family History:   Social history:   Past Medical History:   Diagnosis Date    Depression     Hypertension     Migraine     Stroke [facility])     Past Surgical History:   Procedure Laterality Date    LITHOTRIPSY       History reviewed  No pertinent family history     Social History     Socioeconomic History    Marital status: Single     Spouse name: None    Number of children: None    Years of education: None    Highest education level: None   Occupational History    None   Social Needs    Financial resource strain: None    Food insecurity:     Worry: None     Inability: None    Transportation needs:     Medical: None     Non-medical: None   Tobacco Use    Smoking status: Never Smoker    Smokeless tobacco: Never Used   Substance and Sexual Activity    Alcohol use: Not Currently    Drug use: Never    Sexual activity: Not Currently   Lifestyle    Physical activity:     Days per week: None     Minutes per session: None    Stress: None   Relationships    Social connections:     Talks on phone: None     Gets together: None     Attends [employer] service: None     Active member of club or organization: None     Attends meetings of clubs or organizations: None     Relationship status: None    Intimate partner violence:     Fear of current or ex partner: None     Emotionally abused: None     Physically abused: None     Forced sexual activity: None   Other Topics Concern    None   Social History Narrative    None          Current Medical Diagnosis Allergies   Patient Active Problem List   Diagnosis    [facility]    Depression    Chronic headache    Vertebral artery aneurysm (HCC)    Essential hypertension    Junctional bradycardia    Thrombocytopenia (HCC)    Allergies   Allergen Reactions    Milk-Related Compounds Throat Swelling    Other      cats    Eggs Or Egg-Derived Products     Wheat Bran      Eats it sometimes             Medical Necessity Criteria for [facility] Admission: thrombocytopenia  In addition, the preadmission screen, post-admission physical evaluation, overall plan of care and admissions order demonstrate a reasonable expectation that the following criteria were met at the time of admission to the [facility]  1  The patient requires active and ongoing therapeutic intervention of multiple therapy disciplines (physical therapy, occupational therapy, speech-language pathology, or prosthetics/orthotics), one of which is physical or occupational therapy  2  Patient requires an intensive rehabilitation therapy program, as defined in Chapter 1, section 110 2 2 of the CMS Medicare Policy Manual  This intensive rehabilitation therapy program will consist of at least 3 hours of therapy per day at least 5 days per week or at least 15 hours of intensive rehabilitation therapy within a 7 consecutive day period, beginning with the date of admission to the [facility]  3  The patient is reasonably expected to actively participate in, and benefit significantly from, the intensive rehabilitation therapy program as defined in Chapter 1, section 110 2 2 of the CMS Medicare Policy Manual at this time of admission to the [facility]  He can reasonably be expected to make measurable improvement (that will be of practical value to improve the patients functional capacity or adaptation to impairments) as a result of the rehabilitation treatment, as defined in section 110 3, and such improvement can be expected to be made within the prescribed period of time   As noted in the CMS Medicare Policy Manual, the patient need not be expected to achieve complete independence in the domain of self-care nor be expected to return to his or her prior level of functioning in order to meet this standard  4  The patient must require physician supervision by a rehabilitation physician  As such, a rehabilitation physician will conduct face-to-face visits with the patient at least 3 days per week throughout the patients stay in the [facility] to assess the patient both medically and functionally, as well as to modify the course of treatment as needed to maximize the patients capacity to benefit from the rehabilitation process  5  The patient requires an intensive and coordinated interdisciplinary approach to providing rehabilitation, as defined in Chapter 1, section 110 2 5 of the CMS Medicare Policy Manual  This will be achieved through periodic team conferences, conducted at least once in a 7-day period, and comprising of an interdisciplinary team of medical professionals consisting of: a rehabilitation physician, registered nurse,  and/or , and a licensed/certified therapist from each therapy discipline involved in treating the patient  Changes Since Pre-admission Assessment: None -This patient's participation in rehab continues to be reasonable, necessary and appropriate  CMS Required Post-Admission Physician Evaluation Elements  History and Physical, including medical history, functional history and active comorbidities as in above text      PostAdmission Physician Evaluation:  The patient has the potential to make improvement and is in need of physical, occupational, and/or therapy services  The patient may also need nutritional services  Given the patient's complex medical condition and risk of further medical complications, rehabilitative services cannot be safely provided at a lower level of care, such as a skilled nursing facility   I have reviewed the patient's functional and medical status at the time of the preadmission screening and they are the same as on the day of this admission  I acknowledge that I have personally performed a full physical examination on this patient within 24 hours of admission   The patient and/or family demonstrated understanding the rehabilitation program and the discharge process after we discussed them      Agree in entirety: yes  Minor adaptions: none    Major changes: none     Sita Marie MD  Physical Medicine and Rehabilitation

## 2019-10-19 NOTE — ASSESSMENT & PLAN NOTE
- started on zyprexa 2 5 mg HS by psych however d/c Dr James Mir of psych and cleared patient to stop zyprexa and remain off meds from psych standpoint   - OP FU with psych

## 2019-10-19 NOTE — ASSESSMENT & PLAN NOTE
- Rt vertebral artery aneurysm  - FU CTA ordered per Neurosx request which revealed outpouching of the right vertebral artery and slightly evolving Rt BG hemorrhage; neurosx reviewed CTA and recommend patient be started on ASA 81 mg qd for right vertebral artery aneurysm and FU with CTA in approximally 3 months with OP FU with them at that time (CTA e-prescribed in EMR by neurosx team) and had no further recommendations regarding ICH and recommend OP FU with neurology  - OP FU with Dr Scooter Gill (neurosx) on 1/24/20 and Dr Amy Cortés (neurology) 1/3/20

## 2019-10-19 NOTE — PLAN OF CARE
Problem: Neurological Deficit  Goal: Neurological status is stable or improving  Description  Interventions:  - Monitor and assess patient's level of consciousness, motor function, sensory function, and level of assistance needed for ADLs  - Monitor and report changes from baseline  Collaborate with interdisciplinary team to initiate plan and implement interventions as ordered  - Provide and maintain a safe environment  - Consider seizure precautions  - Consider fall precautions  - Consider aspiration precautions  - Consider bleeding precautions  Outcome: Progressing     Problem: Activity Intolerance/Impaired Mobility  Goal: Mobility/activity is maintained at optimum level for patient  Description  Interventions:  - Assess and monitor patient  barriers to mobility and need for assistive/adaptive devices  - Assess patient's emotional response to limitations  - Collaborate with interdisciplinary team and initiate plans and interventions as ordered  - Encourage independent activity per ability   - Maintain proper body alignment  - Perform active/passive rom as tolerated/ordered  - Plan activities to conserve energy   - Turn patient as appropriate  Outcome: Progressing     Problem: Communication Impairment  Goal: Ability to express needs and understand communication  Description  Assess patient's communication skills and ability to understand information  Patient will demonstrate use of effective communication techniques, alternative methods of communication and understanding even if not able to speak  - Encourage communication and provide alternate methods of communication as needed  - Collaborate with case management/ for discharge needs  - Include patient/family/caregiver in decisions related to communication    Outcome: Progressing     Problem: Potential for Aspiration  Goal: Non-ventilated patient's risk of aspiration is minimized  Description  Assess and monitor vital signs, respiratory status, and labs (WBC)  Monitor for signs of aspiration (tachypnea, cough, rales, wheezing, cyanosis, fever)  - Assess and monitor patient's ability to swallow  - Place patient up in chair to eat if possible  - HOB up at 90 degrees to eat if unable to get patient up into chair   - Supervise patient during oral intake  - Instruct patient/ family to take small bites  - Instruct patient/ family to take small single sips when taking liquids  - Follow patient-specific strategies generated by speech pathologist   Outcome: Progressing  Goal: Ventilated patient's risk of aspiration is minimized  Description  Assess and monitor vital signs, respiratory status, airway cuff pressure, and labs (WBC)  Monitor for signs of aspiration (tachypnea, cough, rales, wheezing, cyanosis, fever)  - Elevate head of bed 30 degrees if patient has tube feeding   - Monitor tube feeding  Outcome: Progressing     Problem: Nutrition  Goal: Nutrition/Hydration status is improving  Description  Monitor and assess patient's nutrition/hydration status for malnutrition (ex- brittle hair, bruises, dry skin, pale skin and conjunctiva, muscle wasting, smooth red tongue, and disorientation)  Collaborate with interdisciplinary team and initiate plan and interventions as ordered  Monitor patient's weight and dietary intake as ordered or per policy  Utilize nutrition screening tool and intervene per policy  Determine patient's food preferences and provide high-protein, high-caloric foods as appropriate  - Assist patient with eating   - Allow adequate time for meals   - Encourage patient to take dietary supplement as ordered  - Collaborate with clinical nutritionist   - Include patient/family/caregiver in decisions related to nutrition    Outcome: Progressing     Problem: Neurological Deficit  Goal: Neurological status is stable or improving  Description  Interventions:  - Monitor and assess patient's level of consciousness, motor function, sensory function, and level of assistance needed for ADLs  - Monitor and report changes from baseline  Collaborate with interdisciplinary team to initiate plan and implement interventions as ordered  - Provide and maintain a safe environment  - Consider seizure precautions  - Consider fall precautions  - Consider aspiration precautions  - Consider bleeding precautions  Outcome: Progressing     Problem: Activity Intolerance/Impaired Mobility  Goal: Mobility/activity is maintained at optimum level for patient  Description  Interventions:  - Assess and monitor patient  barriers to mobility and need for assistive/adaptive devices  - Assess patient's emotional response to limitations  - Collaborate with interdisciplinary team and initiate plans and interventions as ordered  - Encourage independent activity per ability   - Maintain proper body alignment  - Perform active/passive rom as tolerated/ordered  - Plan activities to conserve energy   - Turn patient as appropriate  Outcome: Progressing     Problem: Communication Impairment  Goal: Ability to express needs and understand communication  Description  Assess patient's communication skills and ability to understand information  Patient will demonstrate use of effective communication techniques, alternative methods of communication and understanding even if not able to speak  - Encourage communication and provide alternate methods of communication as needed  - Collaborate with case management/ for discharge needs  - Include patient/family/caregiver in decisions related to communication  Outcome: Progressing     Problem: Potential for Aspiration  Goal: Non-ventilated patient's risk of aspiration is minimized  Description  Assess and monitor vital signs, respiratory status, and labs (WBC)  Monitor for signs of aspiration (tachypnea, cough, rales, wheezing, cyanosis, fever)      - Assess and monitor patient's ability to swallow  - Place patient up in chair to eat if possible  - HOB up at 90 degrees to eat if unable to get patient up into chair   - Supervise patient during oral intake  - Instruct patient/ family to take small bites  - Instruct patient/ family to take small single sips when taking liquids  - Follow patient-specific strategies generated by speech pathologist   Outcome: Progressing  Goal: Ventilated patient's risk of aspiration is minimized  Description  Assess and monitor vital signs, respiratory status, airway cuff pressure, and labs (WBC)  Monitor for signs of aspiration (tachypnea, cough, rales, wheezing, cyanosis, fever)  - Elevate head of bed 30 degrees if patient has tube feeding   - Monitor tube feeding  Outcome: Progressing     Problem: Nutrition  Goal: Nutrition/Hydration status is improving  Description  Monitor and assess patient's nutrition/hydration status for malnutrition (ex- brittle hair, bruises, dry skin, pale skin and conjunctiva, muscle wasting, smooth red tongue, and disorientation)  Collaborate with interdisciplinary team and initiate plan and interventions as ordered  Monitor patient's weight and dietary intake as ordered or per policy  Utilize nutrition screening tool and intervene per policy  Determine patient's food preferences and provide high-protein, high-caloric foods as appropriate  - Assist patient with eating   - Allow adequate time for meals   - Encourage patient to take dietary supplement as ordered  - Collaborate with clinical nutritionist   - Include patient/family/caregiver in decisions related to nutrition    Outcome: Progressing

## 2019-10-19 NOTE — PROGRESS NOTES
CGA with transfers  Left facial droop with left sided weakness, states of pocketing in left side of mouth  Oriented to room and surroundings , callbell within reach  States of frequent right frontal headaches  Will continue to monitor

## 2019-10-19 NOTE — ASSESSMENT & PLAN NOTE
- not currently bradycardic and has not been bradycardic during rehab admission per IM recommends OP FU with PCP with further testing/treatment and/or specialist referral at PCP's discretion

## 2019-10-20 RX ORDER — AMOXICILLIN 250 MG
1 CAPSULE ORAL
Status: DISCONTINUED | OUTPATIENT
Start: 2019-10-20 | End: 2019-10-24 | Stop reason: HOSPADM

## 2019-10-20 RX ADMIN — POLYETHYLENE GLYCOL 3350 17 G: 17 POWDER, FOR SOLUTION ORAL at 12:58

## 2019-10-20 RX ADMIN — LISINOPRIL 20 MG: 20 TABLET ORAL at 09:10

## 2019-10-20 RX ADMIN — OLANZAPINE 2.5 MG: 2.5 TABLET, FILM COATED ORAL at 21:26

## 2019-10-20 RX ADMIN — METHYLPREDNISOLONE 8 MG: 4 TABLET ORAL at 09:10

## 2019-10-20 RX ADMIN — SENNOSIDES AND DOCUSATE SODIUM 1 TABLET: 8.6; 5 TABLET ORAL at 21:26

## 2019-10-20 RX ADMIN — AMLODIPINE BESYLATE 10 MG: 5 TABLET ORAL at 09:10

## 2019-10-20 RX ADMIN — NORTRIPTYLINE HYDROCHLORIDE 10 MG: 10 CAPSULE ORAL at 21:26

## 2019-10-20 NOTE — ASSESSMENT & PLAN NOTE
· Will continue with lisinopril and amlodipine   · Dose adjustment accordingly to keep SBP between 140-150  · PRN hydralazine

## 2019-10-20 NOTE — ASSESSMENT & PLAN NOTE
· Unclear etiology   · Will monitor closely   · No anticoagulation, NSAIDs due to recent intraparenchymal hemorrhage

## 2019-10-20 NOTE — PROGRESS NOTES
10/20/19 0900   Charting Type   Charting Type Shift assessment   Neurological   Neuro (WDL) X   Level of Consciousness Alert/awake   Orientation Level Oriented X4   Cognition Follows commands   Pupil Assessment Yes   R Pupil Size (mm) 3   R Pupil Shape Round   R Pupil Reaction Brisk   L Pupil Size (mm) 3   L Pupil Shape Round   L Pupil Reaction Brisk   Muscle Function/Sensation Assessment ;Motor response;Muscle strength   R Hand  Strong   L Hand  Weak   RUE Motor Response Responds to commands   RUE Muscle Strength 5- Normal strength   LUE Motor Response Responds to commands   LUE Muscle Strength 4- Movement against gravity and limited resistance   RLE Motor Response Responds to commands   RLE Muscle Strength 5- Normal strength   LLE Motor Response Responds to commands   LLE Muscle Strength 4- Movement against gravity and limited resistance   HEENT   HEENT (WDL) X   Head and Face Asymmetrical   R Eye Mildly impaired vision   L Eye Blurred   Vision - Corrective Lenses (at bedside) Glasses   R Ear Intact   L Ear Intact   Mucous Membrane(s) Moist;Pink; Intact   Teeth Intact   Respiratory   Respiratory (WDL) WDL   Cardiac   Cardiac (WDL) WDL   Pain Assessment   Pain Assessment 0-10   Pain Score No Pain   Clinical Progression Gradually improving   Morejon-Patel FACES Pain Rating 0   Peripheral Vascular   Peripheral Vascular (WDL) WDL   Pulses R radial;L radial;R pedal;L pedal   RUE Neurovascular Assessment   R Radial Pulse +2   LUE Neurovascular Assessment   L Radial Pulse +2   RLE Neurovascular Assessment   R Pedal Pulse +2   LLE Neurovascular Assessment   L Pedal Pulse +2   Integumentary   Integumentary (WDL) WDL   Skin Color Appropriate for ethnicity   Sushil Scale   Sensory Perceptions 3   Moisture 4   Activity 3   Mobility 3   Nutrition 3   Friction and Shear 3   Sushil Scale Score 19   Musculoskeletal   Musculoskeletal (WDL) X   Level of Assistance Contact guard assist, steadying assist   Assistive Device None   RUE Full movement   LUE Limited movement   RLE Full movement   LLE Limited movement   Gastrointestinal   Gastrointestinal (WDL) WDL   Last BM Date 10/13/19   Genitourinary   Genitourinary (WDL) WDL   Anal/Rectal   Anal/Rectal (WDL) WDL   Psychosocial   Psychosocial (WDL) WDL

## 2019-10-21 PROCEDURE — 99232 SBSQ HOSP IP/OBS MODERATE 35: CPT | Performed by: PHYSICAL MEDICINE & REHABILITATION

## 2019-10-21 PROCEDURE — 97163 PT EVAL HIGH COMPLEX 45 MIN: CPT

## 2019-10-21 PROCEDURE — 97110 THERAPEUTIC EXERCISES: CPT

## 2019-10-21 PROCEDURE — 97530 THERAPEUTIC ACTIVITIES: CPT

## 2019-10-21 PROCEDURE — 97535 SELF CARE MNGMENT TRAINING: CPT

## 2019-10-21 PROCEDURE — 92522 EVALUATE SPEECH PRODUCTION: CPT

## 2019-10-21 PROCEDURE — 97116 GAIT TRAINING THERAPY: CPT

## 2019-10-21 PROCEDURE — 92610 EVALUATE SWALLOWING FUNCTION: CPT

## 2019-10-21 PROCEDURE — 97165 OT EVAL LOW COMPLEX 30 MIN: CPT

## 2019-10-21 PROCEDURE — 97112 NEUROMUSCULAR REEDUCATION: CPT

## 2019-10-21 RX ORDER — METOCLOPRAMIDE 5 MG/1
10 TABLET ORAL 2 TIMES DAILY PRN
Status: DISCONTINUED | OUTPATIENT
Start: 2019-10-21 | End: 2019-10-21

## 2019-10-21 RX ADMIN — POLYETHYLENE GLYCOL 3350 17 G: 17 POWDER, FOR SOLUTION ORAL at 09:15

## 2019-10-21 RX ADMIN — METHYLPREDNISOLONE 4 MG: 4 TABLET ORAL at 08:47

## 2019-10-21 RX ADMIN — SENNOSIDES AND DOCUSATE SODIUM 1 TABLET: 8.6; 5 TABLET ORAL at 21:10

## 2019-10-21 RX ADMIN — LISINOPRIL 20 MG: 20 TABLET ORAL at 08:47

## 2019-10-21 RX ADMIN — OLANZAPINE 2.5 MG: 2.5 TABLET, FILM COATED ORAL at 21:10

## 2019-10-21 RX ADMIN — NORTRIPTYLINE HYDROCHLORIDE 10 MG: 10 CAPSULE ORAL at 21:10

## 2019-10-21 RX ADMIN — AMLODIPINE BESYLATE 10 MG: 5 TABLET ORAL at 08:47

## 2019-10-21 NOTE — CASE MANAGEMENT
Initial assessment & orientation to [facility]  Pt resides in a multi story home with his nephew & nephew's significant other; can have first floor arrangements  Pt was completely independent PTA, no devices needed  He reported that he has been unable to work in years due to debilitating headaches, but is hopeful to eventually return to the workplace if possible  SW will continue to monitor & assist as needed with [address]

## 2019-10-21 NOTE — NURSING NOTE
Patient noted to have last bowel movement recorded as 10/13/19  Patient reports no discomfort at this time  Patient states he is passing flatus  Abdomen is soft nondistended, nontender  Audible bowel sounds  Hospitalist Brissa Paiz notified new order for senna-S received  Will administer to patient as ordered and follow plan of care

## 2019-10-21 NOTE — PCC PHYSICAL THERAPY
10/21/19: Pt just recently evaluated  Completed all bed mobility, transfers, and ambulation with S assistance, and steps with CGA  At times Pt requires cueing for correct sequencing and safety  Pt remains limited overall by impaired L UE coordination, impaired balance, decreased endurance, and poor safety  Pt will benefit from skilled Physical Therapy to meet PT goals to ultimately restore Pt back to his PLOF

## 2019-10-21 NOTE — NURSING NOTE
Patient resting comfortably in bed at this time  No signs of distress noted  Patient continues with left sided weakness  Patient able to ambulate to the bathroom with supervision of one overnight  Patient with bed alarm in place to promote patient safety  Patient was able to reposition self frequently overnight to promote skin integrity  Call bell within reach  Will continue to monitor patient and follow plan of care

## 2019-10-21 NOTE — PROGRESS NOTES
OT eval and ADL note     10/21/19 0707   Patient Data   Rehab Impairment CVA with L side affected   Etiologic Diagnosis Intraparenchymal hemorrhage of brain [facility])   Date of Onset 10/14/19   Support System   Relationship nephew   Home Setup   Type of Home Single Level   Method of Entry Stairs   Number of Stairs 2   Number of Stairs in Home 13   First Floor Bathroom Full; Shower;Grab Bars   Baseline Information   Transportation    Prior IADL Participation   Money Management Identify Money;Estimate Costs;Estimate Change;Combine Bills;Manage Checkbook   Meal Preparation Partial Participation   Laundry Partial Participation   Home Cleaning Partial Participation   Prior Level of Function   Self-Care 3  Independent - Patient completed the activities by him/herself, with or without an assistive device, with no assistance from a helper  Functional Cognition 3  Independent - Patient completed the activities by him/herself, with or without an assistive device, with no assistance from a helper  Prior Device Used Z  None of the above   Restrictions/Precautions   Precautions Fall Risk;Limb alert  (decreased cooedination LUE)   Pain Assessment   Pain Assessment No/denies pain   Pain Score No Pain   Eating Assessment   Meal Assessed Breakfast   Type of Assistance Needed Supervision   Comment cues for L hand use   Eating CARE Score 4   Oral Hygiene   Type of Assistance Needed Supervision   Comment Pt requests a rest due to increased fatigue from the shower   Oral Hygiene CARE Score 4   Grooming   Able To Initiate Tasks;Comb/Brush Hair;Wash/Dry Face;Brush/Clean Teeth;Wash/Dry Hands   Limitation Noted In Coordination; Safety   Findings Supervision   Tub/Shower Transfer   Limitations Noted In Safety; Coordination   Findings supervision   Shower/Bathe Self   Type of Assistance Needed Supervision   Shower/Bathe Self CARE Score 4   Bathing   Assessed Bath Style Shower   Anticipated D/C Bath Style Shower   Able to Gather/Transport Yes   Able to Wash/Rinse/Dry (body part) Left Arm;Right Arm;L Upper Leg;R Upper Leg;L Lower Leg/Foot;R Lower Leg/Foot;Chest;Abdomen;Buttocks; Perineal Area   Limitations Noted in Coordination; Safety; Endurance   Findings  Supervision   Dressing/Undressing Clothing   Able to  [employer]   Remove UB Clothes Pullover Shirt   Don UB Clothes Pullover Shirt   Type of Assistance Needed Supervision   Upper Body Dressing CARE Score 4   Remove LB Clothes Pants; Undergarment;Socks; Shoes   Don LB [employer]; Undergarment;Socks; Shoes   Type of Assistance Needed Supervision   Lower Body Dressing CARE Score 4   Limitations Noted In Coordination; Endurance; Safety   Putting On/Taking Off Footwear   Type of Assistance Needed Supervision   Putting On/Taking Off Footwear CARE Score 4   Toileting Hygiene   Type of Assistance Needed Supervision   Comment standing for urination   Toileting Hygiene CARE Score 4   Toileting   Able to [address] down yes, up yes  Manage Hygiene Bladder   Findings Supervision   Transfer Bed/Chair/Wheelchair   Type of Assistance Needed Incidental touching;Verbal cues   Chair/Bed-to-Chair Transfer CARE Score 4   Lying to Sitting on Side of Bed   Type of Assistance Needed Supervision   Lying to Sitting on Side of Bed CARE Score 4   Sit to Stand   Type of Assistance Needed Supervision   Sit to Stand CARE Score 4   Picking Up Object   Type of Assistance Needed Incidental touching   Picking Up Object CARE Score 4   Ambulation   Distance Walked (feet) 12 ft   Limitations Noted In Coordination; Safety   Findings S/CGA   RUE Assessment   RUE Assessment WFL  (5/5 MMT gross;y RUE)   LUE Assessment   LUE Assessment X  (4-/5 MMT grossly LUE)   Coordination   Movements are Fluid and Coordinated 0   Coordination and Movement Description decreased gross and fine motor coordination L hand   Sensation   Light Touch No apparent deficits   Propioception Partial deficits in the LUE   Cognition   Overall Cognitive Status WFL   Arousal/Participation Alert; Responsive; Cooperative   Attention Within functional limits   Orientation Level Oriented X4   Memory Within functional limits   Comments no changed noted by patient  since event   Discharge [address] Retired/not working  (for past 5 years due to frequent headahces)   Patient's Discharge Plan Plan is to return home with nephew  Enjoys going tot he gym and helping outdoors  Barriers to Discharge Home Safety Considerations; Other  (coordination)   Impressions Pt presents following hospitalization due to Intraparenchymal hemorrhage of brain ([facility] [address] ) and Vertebral artery aneurysm ([facility] [address]  presenting to rehab with decreased strength and coordination LUE as well as increased fatigue due to decrease activity tolerance by the end of the session  Pt demonstrates difficulty with fine motor tasks and occasional safety with movements and mobility  Pt also presemts with L facial droop and mildlyslurred speech with ST requested by OTR/L  Pt will benefit from skilled OT services to increase independence with daily tasks      OT Therapy Minutes   OT Time In 0384   OT Time Out 0828   OT Total Time (minutes) 81   OT Mode of treatment - Individual (minutes) 81

## 2019-10-21 NOTE — PROGRESS NOTES
Physical Medicine and Rehabilitation Progress Note  Phan Carrington 45 y o  male MRN: [id number]  Unit/Bed#: -01 Encounter: [id number]    HPI: Phan Carrington is a 45 y o  male who presented to the [facility] [address] with facial droop and L arm weakness and was found to have rt BG hemorrhage in the setting of elevated BP  Patient was managed non-operatively with BP control  There was concern for SI and psych did evaluate the patient and he was cleared from psych perspective  Chief Complaint: ICH    Interval/Subjective: d/w patient risks/benefits of being on both reglan prn and zyprexa scheduled     ROS: A 10 point ROS was performed; negative except as noted above       Assessment/Plan:      Vertebral artery aneurysm (HCC)  Assessment & Plan  - Rt vertebral artery aneurysm  - per neurosx patient to have repeat imaging and start ASA 81 mg qd if imaging stable  - will clarify with neurosx timeline to repeat imaging and starting ASA 81 mg qd     * Intraparenchymal hemorrhage of brain (HCC)  Assessment & Plan  - rt BG   - non-op management per neurosx  - BP control   - will clarify timeline of FU imaging   - OP FU with neurosx     Thrombocytopenia (HCC)  Assessment & Plan  - mild at 145 ()  - IM monitoring     Junctional bradycardia  Assessment & Plan  - not currently bradycardic     Essential hypertension  Assessment & Plan  - started on lisinopril and norvasc in acute care  - IM managing     Chronic headache  Assessment & Plan  - chronic almost daily headache since 2012   - does not recall any head trauma at that time  - without migraneous features appears to be more tension type  - completed short course of steroids   - started on zyprexa for mood d/o in acute care by psych  - stated on lisinopril for HTN in acute care by IM   - started on nortriptyline for headaches as well in acute care  - has tried topomax and propranolol in the past which were not effective however dose and length of treatment are unknown    - prn IV reglan (along with zyprexa) was given in acute care which patient states helped and was tolerated w/o AE; d/w patient risks benefits of taking prn reglan while he is on zyprexa as both are neuroleptic agents (including but not limited to neuroleptic sndyrome); will d/w psych as well   - OP neuro FU     Depression  Assessment & Plan  - started on zyprexa 2 5 mg HS by psych  - OP FU with psych      Scheduled Meds:    Current Facility-Administered Medications:  acetaminophen 975 mg Oral Q8H PRN Malena Buenrostro MD   amLODIPine 10 mg Oral Daily Malena Buenrostro MD   hydrALAZINE 10 mg Oral Q8H PRN KOFFI Lowery   lisinopril 20 mg Oral Daily Malena Buenrostro MD   nortriptyline 10 mg Oral HS Malena Buenrostro, MD   OLANZapine 2 5 mg Oral HS Malena Buenrostro MD   polyethylene glycol 17 g Oral Daily PRN Malena Buenrostro, MD   senna-docusate sodium 1 tablet Oral HS Noemy Sam PA-C          DVT ppx: SCDs only due to [address]     Objective:    Functional Update:  Mobility: sup  Transfers: sup  ADLs: sup         Physical Exam:    T: 97 5  HR: 88  BP: 118/84  RR: 18  POx: 97%       General: alert, no apparent distress, cooperative and comfortable  HEENT:  Head: Normal, normocephalic, atraumatic    CARDIAC:  +S1/2  LUNGS:  respirations unlabored   ABDOMEN:  soft NT   EXTREMITIES:  volume status currently stable   NEURO:   mental status, speech normal, alert and oriented x3  PSYCH:  mood/affect currently stable       Laboratory:   Results from last 7 days   Lab Units 10/16/19  0511 10/15/19  0533   HEMOGLOBIN g/dL 14 7 15 2   HEMATOCRIT % 42 3 42 4   WBC Thousand/uL 6 76  --      Results from last 7 days   Lab Units 10/16/19  0511 10/15/19  0533   BUN mg/dL 16 9   SODIUM mmol/L 143 137   POTASSIUM mmol/L 3 5 3 6   CHLORIDE mmol/L 110* 104   CREATININE mg/dL 1 10 0 87

## 2019-10-21 NOTE — PROGRESS NOTES
10/21/19 1605   Patient Data   Rehab Impairment CVA L side affected    Etiologic Diagnosis rt BG hemorrhage    Date of Onset 10/14/19   Support System   Name Eva Palm   Relationship Mother   Able to provide 24 hour supervision No   Multiple Support Systems Yes   Home Setup   Type of Home Single Level   Method of Entry Stairs   Number of Stairs in Home 13   In Home Hand Rail Left   First Floor Bathroom Full; Shower   First Floor Setup Available Yes   Prior IADL Participation   Money Management Identify Money;Estimate Costs;Estimate Change;Combine Bills;Manage Checkbook   Meal Preparation Partial Participation   Laundry Partial Participation   Home Cleaning Partial Participation   Prior Level of Function   Self-Care 3  Independent - Patient completed the activities by him/herself, with or without an assistive device, with no assistance from a helper  Indoor-Mobility (Ambulation) 3  Independent - Patient completed the activities by him/herself, with or without an assistive device, with no assistance from a helper  Stairs 3  Independent - Patient completed the activities by him/herself, with or without an assistive device, with no assistance from a helper  Functional Cognition 3  Independent - Patient completed the activities by him/herself, with or without an assistive device, with no assistance from a helper  Patient Preference   Nickname (Patient Preference) Roman Ego   Psychosocial   Psychosocial (WDL) WDL   Patient Behaviors/Mood Calm; Cooperative;Flat affect   Needs Expressed Physical   Ability to Express Feelings Able to express   Ability to Express Needs Able to express   Ability to Express Thoughts Able to express   Ability to Understand Others Understands   Restrictions/Precautions   Precautions Fall Risk;Limb alert   Pain Assessment   Pain Assessment No/denies pain   Pain Score No Pain   Eating Assessment   Swallow Precautions Yes   Food To Mouth Yes   Able To Cut Yes   Safety Needs Increase Time Meal Assessed Dinner   QI: Swallowing/Nutritional Status Regular food   Current Diet Regular   Intake Mode PO   Type of Assistance Needed Supervision   Comment cues to clear left cheek oral cavity   Eating CARE Score 4   Comprehension   Comprehension (FIM) 5 - Understands basic directions and conversation   Expression   Expression (FIM) 5 - Needs help/cues only RARELY (< 10% of the time)   Social Interaction   Social Interaction (FIM) 6 - Interacts appropriately with others BUT requires extra  time   Problem Solving   Problem solving (FIM) 6 - Solves complex problems BUT requires extra time   Memory   Recognize People Yes   Remember Routine Yes   Initiates Tasks Yes   Memory (FIM) 6 - Recognizes with extra time   Cognition   Overall Cognitive Status [facility]   Arousal/Participation Alert; Cooperative   Attention Within functional limits   Orientation Level Oriented X4   Memory Within functional limits   Following Commands Follows one step commands without difficulty   Discharge Information   Impressions Grayson Vines is a 45 y o  male who presented to the [address] [place] [id number] with facial droop and L arm weakness and was found to have rt BG hemorrhage in the setting of elevated BP  Patient was managed non-operatively with BP control  There was concern for SI and psych did evaluate the patient and he was cleared from psych perspective  Prior to admission, patient was essentially independent with mobility, transfers and ADL's  Iliana Longoria lives with his family (nephew and girlfriend) in a single family home  First floor set up is available  The patient will not have 24 hour supervision available upon discharge  Skilled speech assessment was completed  Patient presents with mild oral pharyngeal dysphagia secondary to left facial droop and numbness and mild incoordination and weakness  There is intermittent cough with thin liquids associated with mild pocketing left and larger sip size    He is able to protect the airway with strong cough  Speech is mildly slurred with associated anxiety  Speech comprehension and expression are supervision level at the multi step level due to distractibility and reduced sequencing  He benefits from repetition over time  Cognitively, patient presents as mod I for memory and reasoning his current situation  He is a good participant and uses visual aids well to maintain and understand his new therapy program   Skilled ST is recommended on the short term to address the above     SLP Therapy Minutes   SLP Time In 5632   SLP Time Out 6933   SLP Total Time (minutes) 45   SLP Mode of treatment - Individual (minutes) 45

## 2019-10-21 NOTE — PCC CARE MANAGEMENT
Initial assessment & orientation to [facility]  Pt resides in a multi story home with his nephew & nephew's significant other; can have first floor arrangements  Pt was completely independent PTA, no devices needed  He reported that he has been unable to work in years due to debilitating headaches, but is hopeful to eventually return to the workplace if possible  SW will continue to monitor & assist as needed with [facility] [address] [place] [address]  10/22/19 - Tx team recommendations reviewed  Dc planned for 10/24 at 1 PM  Pt will be transported home by car with family, which Tx team has deetrmined to be a safe mode of transport  Outpatient PT, OT, & ST to be provided by  in [address] [place] [address] per Pt preference; referral made & they will call Pt to schedule  Additional FU appts indicated on AVS, to be scheduled by Pt per his scheduling preferences  SW will continue to monitor & assist as needed with Tx & DC planning  On 10/23/19, KARIN also reached out to representative, Jose Yo at [facility] at [phone], who verified that the retro Chanda Eke is being reviewed by [facility] in [address]  [facility] will call this worker back when there has been a determination  Reference number is [id number]

## 2019-10-21 NOTE — PCC SPEECH THERAPY
Harper White is a 45 y o  male who presented to the [facility] [address] with facial droop and L arm weakness and was found to have rt BG hemorrhage in the setting of elevated BP  Patient was managed non-operatively with BP control  There was concern for SI and psych did evaluate the patient and he was cleared from psych perspective  Prior to admission, patient was essentially independent with mobility, transfers and ADL's  [facility] with [facility] family (nephew and girlfriend) in a single family home  First floor set up is available  The patient will not have 24 hour supervision available upon discharge  Skilled speech assessment was completed  Patient presents with mild oral pharyngeal dysphagia secondary to left facial droop and numbness and mild incoordination and weakness  There is intermittent cough with thin liquids associated with mild pocketing left and larger sip size  He is able to protect the airway with strong cough  Speech is mildly slurred with associated anxiety  Speech comprehension and expression are supervision level at the multi step level due to distractibility and reduced sequencing  He benefits from repetition over time  Cognitively, patient presents as mod I for memory and reasoning his current situation  He is a good participant and uses visual aids well to maintain and understand his new therapy program   Skilled ST is recommended on the short term to address the above

## 2019-10-21 NOTE — PROGRESS NOTES
10/21/19 1415   Patient Data   Rehab Impairment CVA L side affected    Etiologic Diagnosis rt BG hemorrhage    Date of Onset 10/14/19   Support System   Name Chapis Giron   Relationship Nephew   Home Setup   Type of Home Single Level   Method of Entry Stairs   Number of Stairs   (2)   Number of Stairs in Home 13   In Home Hand Rail Left   First Floor Bathroom Full; Shower;Grab Bars   First Floor Setup Available Yes   Baseline Information   Vocation   (not working )   Transportation    Prior Level of Function   Self-Care 3  Independent - Patient completed the activities by him/herself, with or without an assistive device, with no assistance from a helper  Indoor-Mobility (Ambulation) 3  Independent - Patient completed the activities by him/herself, with or without an assistive device, with no assistance from a helper  Stairs 3  Independent - Patient completed the activities by him/herself, with or without an assistive device, with no assistance from a helper  Prior Device Used Z  None of the above   Patient Preference   Patient Normally Wakes at 0600   Patient Normally Goes to Sleep at 2130   Psychosocial   Psychosocial (WDL) WDL   Patient Behaviors/Mood Calm   Restrictions/Precautions   Precautions Fall Risk;Limb alert  (decreased coordination L UE)   Pain Assessment   Pain Assessment No/denies pain   Pain Score No Pain   Transfer Bed/Chair/Wheelchair   Limitations Noted In Balance; Endurance; Coordination   Type of Assistance Needed Supervision   Chair/Bed-to-Chair Transfer CARE Score 4   Roll Left and Right   Type of Assistance Needed Supervision   Roll Left and Right CARE Score 4   Sit to Lying   Type of Assistance Needed Supervision   Sit to Lying CARE Score 4   Lying to Sitting on Side of Bed   Type of Assistance Needed Supervision   Lying to Sitting on Side of Bed CARE Score 4   Sit to Stand   Type of Assistance Needed Supervision   Sit to Stand CARE Score 4   Ambulation   Primary Mode of Locomotion Prior to Admission Walk   Distance Walked (feet)   (200')   Assist Device   (none)   Gait Pattern Inconsistant Cammie   Limitations Noted In Coordination; Safety; Endurance;Balance   Walk Assist Level Close Supervision   Walk 10 Feet   Type of Assistance Needed Supervision   Walk 10 Feet CARE Score 4   Walk 50 Feet with Two Turns   Type of Assistance Needed Supervision   Walk 50 Feet with Two Turns CARE Score 4   Walk 150 Feet   Type of Assistance Needed Supervision   Walk 150 Feet CARE Score 4   Walking 10 Feet on Uneven Surfaces   Type of Assistance Needed Supervision   Walking 10 Feet on Uneven Surfaces CARE Score 4   Wheel 50 Feet with Two Turns   Reason if not Attempted Activity not applicable   Wheel 50 Feet with Two Turns CARE Score 9   Wheel 150 Feet   Reason if not Attempted Activity not applicable   Wheel 593 Feet CARE Score 9   Curb or Single Stair   Style negotiated Single stair   Type of Assistance Needed Incidental touching   1 Step (Curb) CARE Score 4   4 Steps   Type of Assistance Needed Incidental touching   4 Steps CARE Score 4   12 Steps   Type of Assistance Needed Incidental touching   12 Steps CARE Score 4   Stairs   Type Stairs   # of Steps   (13)   Assist Devices Single Rail   Comprehension   Comprehension (FIM) 5 - Understands basic directions and conversation   Expression   Expression (FIM) 5 - Needs help/cues only RARELY (< 10% of the time)   Social Interaction   Social Interaction (FIM) 6 - Interacts appropriately with others BUT requires extra  time   Problem Solving   Problem solving (FIM) 6 - Solves complex problems BUT requires extra time   RLE Assessment   RLE Assessment WFL   LLE Assessment   LLE Assessment WFL   RUE Assessment   RUE Assessment WFL   LUE Assessment   LUE Assessment   (decreased L UE strength grossly 4-/5)   Coordination   Movements are Fluid and Coordinated 0   Coordination and Movement Description   (poor fluid movement with transfers/amb; VC for safety) Sensation   Light Touch No apparent deficits   Propioception No apparent deficits   Cognition   Overall Cognitive Status WFL   Arousal/Participation Alert; Cooperative   Attention Within functional limits   Orientation Level Oriented X4   Memory Within functional limits   Following Commands Follows one step commands without difficulty   Therapeutic Exercise   Therapeutic Exercise/Activity   (Gait training, transfer training, community re-integration)   Discharge [address] Retired/not working  (not working for last 5 years due to headaches )   Patient's Discharge Plan   (Wants to be able to play the drums and return to working out)   Impressions 45year old male presents to [facility] following R BG hemorrhage resulting in L arm weakness and facial droop  Pt requires assistance with functional mobility activities due to impaired balance, impaired coordination, decreased strength, and poor safety  Pt will benefit from skilled PT to address the deficits listed above to meet assigned PT goals and return Pt back to his PLOF      PT Therapy Minutes   PT Time In 3358   PT Time Out 1516   PT Total Time (minutes) 61   PT Mode of treatment - Individual (minutes) 45   PT Mode of treatment - Concurrent (minutes) 16   PT Mode of treatment - Group (minutes) 0   PT Mode of treatment - Co-treat (minutes) 0   PT Mode of Treatment - Total time(minutes) 61 minutes   PT Cumulative Minutes 61   Cumulative Minutes   Cumulative therapy minutes 61

## 2019-10-21 NOTE — PROGRESS NOTES
10/21/19 1200   Pain Assessment   Pain Assessment No/denies pain   Restrictions/Precautions   Precautions Fall Risk;Limb alert  (decreased coordination LUE)   Sit to Stand   Type of Assistance Needed Supervision   Sit to Stand CARE Score 4   Bed-Chair Transfer   Type of Assistance Needed Supervision   Chair/Bed-to-Chair Transfer CARE Score 4   Exercise Tools   Exercise Tools Yes   Other Exercise Tool 1 knots out and then into tubing B hands with LUe as active assist to the right hand   Other Exercise Tool 2 Purdue pegboard 5 pins with increased difficulty L hand   Additional Activities   Additional Activities Other (Comment)   Additional Activities Comments fxl mobility without DME supervision in room and hallway   Assessment   Treatment Assessment Pt participates in mobility and coordination activities with 15 minutes cocnurrent treatment focusing on simialr goals as another to increase UB strength and coordination for ADL tasks  Pt demonstrates most difficulty with small fine motor tasks  Pt deomstrates mobility without DME and difficult coordinating BLE movemenst well  Pt will benefit form continued skilled OT services to increase independence with daily tasks  Problem List Decreased strength;Decreased endurance; Impaired balance;Decreased coordination;Decreased safety awareness   Plan   Treatment/Interventions ADL retraining;Functional transfer training; Therapeutic exercise; Endurance training;Patient/family training; Compensatory technique education   Progress Progressing toward goals   OT Therapy Minutes   OT Time In 1200   OT Time Out 1230   OT Total Time (minutes) 30   OT Mode of treatment - Individual (minutes) 15   OT Mode of treatment - Concurrent (minutes) 15   Therapy Time missed   Time missed?  No

## 2019-10-21 NOTE — TREATMENT PLAN
Individualized Plan of [address] 45 y o  male MRN: [id number]  Unit/Bed#: -01 Encounter: [id number]     PATIENT INFORMATION  ADMISSION DATE: 10/19/2019  2:09 PM AVI CATEGORY: ICH   ADMISSION DIAGNOSIS: Other nontraumatic intracerebral hemorrhage [I61 8]  EXPECTED LOS: 1-2 wks     MEDICAL/FUNCTIONAL PROGNOSIS  Based on my assessment of the patient's medical conditions and current functional status, the prognosis for attaining medical and functional goals or the IRF stay is:  Fair     Medical Goals: risk factor modification     ANTICIPATED DISCHARGE DISPOSITION AND SERVICES  COMMUNITY SETTING: home     ANTICIPATED FOLLOW-UP SERVICE:   Outpatient Therapy Services: PT/OT/ST    DISCIPLINE SPECIFIC PLANS:  Required Disciplines & Services: PT/OT/ST    REQUIRED THERAPY:  Therapy Hours per Day Days per Week Total Days   Physical Therapy 1 5 10   Occupational Therapy 1 5 10   Speech/Language Therapy 1 5 10   NOTE: Additional therapy time(s) may be added as appropriate to meet patient needs and to achieve functional goals        ANTICIPATED FUNCTIONAL OUTCOMES:  ADL: Patient will be independent with ADLs with least restrictive device upon completion of rehab program   Bladder/Bowel: Patient will return to premorbid level for bladder/bowel management upon completion of rehab program   Transfers: Patient will be independent with transfers with least restrictive device upon completion of rehab program   Locomotion: Patient will be independent with locomotion with least restrictive device upon completion of rehab program   Cognitive: Patient will return to premorbid level of cognitive activity upon completion of rehab program     DISCHARGE PLANNING NEEDS  Equipment needs: TBD       REHAB ANTICIPATED PARTICIPATION RESTRICTIONS:  None

## 2019-10-22 ENCOUNTER — APPOINTMENT (INPATIENT)
Dept: CT IMAGING | Facility: HOSPITAL | Age: 44
DRG: 058 | End: 2019-10-22
Payer: COMMERCIAL

## 2019-10-22 PROCEDURE — 97535 SELF CARE MNGMENT TRAINING: CPT

## 2019-10-22 PROCEDURE — 99233 SBSQ HOSP IP/OBS HIGH 50: CPT | Performed by: PHYSICAL MEDICINE & REHABILITATION

## 2019-10-22 PROCEDURE — 97110 THERAPEUTIC EXERCISES: CPT

## 2019-10-22 PROCEDURE — 97112 NEUROMUSCULAR REEDUCATION: CPT

## 2019-10-22 PROCEDURE — 97530 THERAPEUTIC ACTIVITIES: CPT

## 2019-10-22 PROCEDURE — 92507 TX SP LANG VOICE COMM INDIV: CPT

## 2019-10-22 PROCEDURE — 70496 CT ANGIOGRAPHY HEAD: CPT

## 2019-10-22 PROCEDURE — 92526 ORAL FUNCTION THERAPY: CPT

## 2019-10-22 PROCEDURE — 70498 CT ANGIOGRAPHY NECK: CPT

## 2019-10-22 PROCEDURE — 97116 GAIT TRAINING THERAPY: CPT

## 2019-10-22 RX ORDER — METOCLOPRAMIDE 5 MG/1
10 TABLET ORAL 2 TIMES DAILY PRN
Status: DISCONTINUED | OUTPATIENT
Start: 2019-10-22 | End: 2019-10-24 | Stop reason: HOSPADM

## 2019-10-22 RX ADMIN — NORTRIPTYLINE HYDROCHLORIDE 10 MG: 10 CAPSULE ORAL at 22:18

## 2019-10-22 RX ADMIN — SENNOSIDES AND DOCUSATE SODIUM 1 TABLET: 8.6; 5 TABLET ORAL at 22:18

## 2019-10-22 RX ADMIN — AMLODIPINE BESYLATE 10 MG: 5 TABLET ORAL at 10:22

## 2019-10-22 RX ADMIN — LISINOPRIL 20 MG: 20 TABLET ORAL at 10:22

## 2019-10-22 RX ADMIN — ACETAMINOPHEN 975 MG: 325 TABLET ORAL at 20:29

## 2019-10-22 RX ADMIN — POLYETHYLENE GLYCOL 3350 17 G: 17 POWDER, FOR SOLUTION ORAL at 14:07

## 2019-10-22 RX ADMIN — IOHEXOL 85 ML: 350 INJECTION, SOLUTION INTRAVENOUS at 15:53

## 2019-10-22 NOTE — CASE MANAGEMENT
It was reported that Pt's insurance was straight Medicaid  This worker noticed that it is recorded as [employer], which requires pre-auth  Pre-auth had not been obtained by admissions staff as the insurance was reported to them incorrectly  This worker contacted [facility] [employer]  at [phone] & spoke with Sandie Rodriguez, who instructed this worker to fax clinicals to [phone] to seek retro authorization  All clinicals faxed at 10 AM; awaiting determination

## 2019-10-22 NOTE — PROGRESS NOTES
10/22/19 1605   Pain Assessment   Pain Assessment No/denies pain   Pain Score No Pain   Restrictions/Precautions   Precautions Fall Risk;Limb alert   Comprehension   QI: Comprehension 4  Undestands: Clear comprehension without cues or repetitions   Comprehension (FIM) 6 - Understands complex/abstract but requires more time   Expression   QI: Expression 4  Express complex messages without difficulty and with speech that is clear and easy to [place]   Expression (FIM) 5 - Needs help/cues only RARELY (< 10% of the time)   Social Interaction   Cooperation with staff   Participation Individual   Social Interaction (FIM) 6 - Interacts appropriately with others BUT requires extra  time   Problem Solving   Routine Manages call bell;Manges precautions;Manages ADL   Problem solving (FIM) 6 - Solves complex problems BUT requires extra time   Memory   Recognize People Yes   Remember Routine Yes   Initiates Tasks Yes   Recalls Precaution Yes   Memory (FIM) 6 - Recognizes with extra time   Executive Function Skills   [facility]   Planning [facility]   Memory Skills   Orientation Level Oriented X4   Auditory Comprehension   Comphrehends Conversation Complex   Interfering Components Attention - sustained   EffectiveTechniques Extra processing time   Speech/Swallow Mechanism Exam   Labial Symmetry Abnormal symmetry left   Labial ROM Reduced left   Facial Symmetry Left droop   Facial ROM Mild;Reduced left   Facial Sensation Reduced   Lingual Strength Mild reduced   Respratory Status Nasal Cannula   Motor Speech Evaluation   Dysarthria Yes   Mild Dystharia Impaired articulation   Speech/Language/Cognition Assessmetn   Treatment Assessment Speech Pathology Daily Treatment Note - Speech/Cognitive Communication Skills - Oral Facial exercises were completed 1:1 with SLP as a direct model and using a large mirror for visual feedback to promote full ROM of oral articulators    Exercises included musculature of the jaw, cheeks, lips and tongue  A tongue depressor was utilized with resistance to promote rear of tongue retraction as well as swallow initiation for safety with thin liquids  Reps X10 completed as tolerated  Patient is a willing participant  Current level is min cues with demonstration to ensure adequate exercise completion  Further focus is needed in this area to ensure understanding and accurate completion  Swallow Information   Current Risks for Dysphagia & Aspiration Dysarthria;General debilitation;New Neuro event   Current Symptoms/Concerns Cough; With liquids   Current Diet Regular; Thin liquid   Consistencies Assessed and Performance   Oral Stage Mild impaired   Phargngeal Stage Mild impaired   Recommendations   Diet Solid Recommendation Regular consistency   Diet Liquid Recommendation Thin liquid   General Precautions Minimize distractions   Compensatory Swallowing Strategies Alternate solids and liquids   Eating   Type of Assistance Needed Set-up / clean-up   Eating CARE Score 5   Swallow Assessment   Swallow Treatment Assessment Speech Pathology Daily Treatment Note - Swallow Oral Function - Patient seen 1:1 with his meal   Compensatory safe swallow education provided which included appropriate 90 degree head/neck/trunk posture, prep to small bites, small and single sips of liquid,complete oral breakdown of solids with formation of a cohesive bolus, oral closure with spoon retrieval, decreased speaking on oral phase, oral clearance prior to reintroduction of bolus, alternate solids with liquids, and rate control  Current level for use of technique is supervision to min cues  Continued focus needed in this area for retention of strategies  Swallow Assessment Prognosis   Prognosis Good   Prognosis Considerations Participation level   SLP Therapy Minutes   SLP Time In 0926   SLP Time Out 7560   SLP Total Time (minutes) 40   SLP Mode of treatment - Individual (minutes) 40   Therapy Time missed   Time missed?  No   Daily FIM Score   Eating (FIM) 6 - Patient requires increased time or safety concern

## 2019-10-22 NOTE — PROGRESS NOTES
Physical Medicine and Rehabilitation Progress Note  Binnie Duane 45 y o  male MRN: [id number]  Unit/Bed#: -01 Encounter: [id number]    HPI: Binnie Duane is a 45 y o  male who presented to the [facility] with facial droop and L arm weakness and was found to have rt BG hemorrhage in the setting of elevated BP  Patient was managed non-operatively with BP control  There was concern for SI and psych did evaluate the patient and he was cleared from psych perspective  Chief Complaint: ICH    Interval/Subjective: d/w patient potential dc date and patient is agreeable     ROS: A 10 point ROS was performed; negative except as noted above       Assessment/Plan:      Vertebral artery aneurysm (HCC)  Assessment & Plan  - Rt vertebral artery aneurysm  - FU CTA ordered per Neurosx request and may be started on ASA 81 mg qd after CTA pending results     * Intraparenchymal hemorrhage of brain (HCC)  Assessment & Plan  - rt BG   - non-op management per neurosx  - BP control   - FU CTA ordered per neurosx request   - OP FU with neurosx     Thrombocytopenia (HCC)  Assessment & Plan  - mild at 145 ()  - IM monitoring     Junctional bradycardia  Assessment & Plan  - not currently bradycardic     Essential hypertension  Assessment & Plan  - started on lisinopril and norvasc in acute care  - IM managing     Chronic headache  Assessment & Plan  - chronic almost daily headache since 2012   - does not recall any head trauma at that time  - without migraneous features appears to be more tension type  - completed short course of steroids   - started on zyprexa for mood d/o in acute care by psych but on 10/22 d/w Dr Idania Truong of psych and recommended dc'ing zyprexa and has cleared patient from psych standpoint to be off of psych meds from psych standpoint  - stated on lisinopril for HTN in acute care by IM   - started on nortriptyline for headaches as well in acute care by neuro service   - has tried topomax and propranolol in the past which were not effective however dose and length of treatment are unknown    - prn reglan (was receiving reglan in acute care and patient tolerated w/o AE)   - neuro in acute care recommended repeat MRI in 3 months as OP (e-prescribed by neuro team in EMR)  - OP neuro FU (in acute care FU was arranged with   [facility] for 1/3/20)     Depression  Assessment & Plan  - started on zyprexa 2 5 mg HS by psych however d/c Dr Larissa Alejandro of psych and cleared patient to stop zyprexa and remain off meds from psych standpoint   - OP FU with psych      Scheduled Meds:    Current Facility-Administered Medications:  acetaminophen 975 mg Oral Q8H PRN Saad Patrick MD   amLODIPine 10 mg Oral Daily Saad Patrick MD   hydrALAZINE 10 mg Oral Q8H PRN KOFFI Lopes   lisinopril 20 mg Oral Daily Saad Patrick MD   metoclopramide 10 mg Oral BID PRN Saad Patrick MD   nortriptyline 10 mg Oral HS Saad Patrick MD   polyethylene glycol 17 g Oral Daily PRN Saad Patrick MD   senna-docusate sodium 1 tablet Oral HS Kip Koyanagi, PA-C          DVT ppx: SCDs only due to [address]     Objective:    Functional Update:  Mobility: sup  Transfers: sup  ADLs: sup        Physical Exam:  Vitals:    10/22/19 0742   BP: 144/96   Pulse: 78   Resp: 16   Temp: 98 4   SpO2: 98%            General: alert, no apparent distress, cooperative and comfortable  HEENT:  Head: Normal, normocephalic, atraumatic    CARDIAC:  +S1/2  LUNGS:  respirations unlabored   ABDOMEN:  soft NT   EXTREMITIES:  volume status currently stable   NEURO:   mental status, speech normal, alert and oriented x3  PSYCH:  mood/affect currently stable          Laboratory:   Results from last 7 days   Lab Units 10/16/19  0511   HEMOGLOBIN g/dL 14 7   HEMATOCRIT % 42 3   WBC [place]/uL 6 76     Results from last 7 days   Lab Units 10/16/19  0511   BUN mg/dL 16   SODIUM mmol/L 143   POTASSIUM mmol/L 3 5   CHLORIDE mmol/L 110*   CREATININE mg/dL 1 10            This patient was discussed by the Interdisciplinary Team in weekly case conference today  The care of the patient was extensively discussed with all care providers and an appropriate rehabilitation plan was formulated unique for this patient  Barriers were identified preventing progression of therapy and appropriate interventions were discussed with each discipline  Please see the team note for input from all disciplines regarding barriers, intervention, and discharge planning  [ x ] Total time spent: 35 Mins, and greater than 50% of this time was spent counseling/coordinating care

## 2019-10-22 NOTE — PCC NURSING
10/21/19 Patient recent admission to [facility] following a hemorrhagic stroke  Patient with left sided weakness and left facial droop noted  Patient noted to pocket food at times in left side of mouth  Heart rate is regular  Lungs are clear on room air  Bed alarm in place to maintain patient safety  Medications as ordered for constipation per hospitalist audible bowel sounds abdomen is soft and nontender patient passing flatus  SCDs as ordered for DVT prophylaxis  Skin remains intact  No reports of pain  Supervision for transfers and ambulation

## 2019-10-22 NOTE — PROGRESS NOTES
10/22/19 0933   Pain Assessment   Pain Assessment No/denies pain   Restrictions/Precautions   Precautions Fall Risk;Limb alert  (decreased LUE coordination)   Oral Hygiene   Type of Assistance Needed Supervision   Oral Hygiene CARE Score 4   Shower/Bathe Self   Type of Assistance Needed Supervision   Shower/Bathe Self CARE Score 4   Bathing   Assessed Bath Style Shower   Anticipated D/C Bath Style Shower   Able to Adeline Shar Yes   Able to [employer] Temperature Yes   Able to Wash/Rinse/Dry (body part) Left Arm;Right Arm;L Upper Leg;R Upper Leg;L Lower Leg/Foot;R Lower Leg/Foot;Chest;Abdomen;Perineal Area; Buttocks   Limitations Noted in Coordination; Safety   Findings  Supervision to gather and transport clothing with encouragement to utilize LUE functionally during tasks   Tub/Shower Transfer   Limitations Noted In Safety   Adaptive Equipment Seat with out Back;Grab Bars   Findings S/ Mod I   Upper Body Dressing   Type of Assistance Needed Supervision   Upper Body Dressing CARE Score 4   Lower Body Dressing   Type of Assistance Needed Supervision   Lower Body Dressing CARE Score 4   Putting On/Taking Off Footwear   Type of Assistance Needed Supervision   Putting On/Taking Off Footwear CARE Score 4   Picking Up Object   Type of Assistance Needed Supervision   Picking Up Object CARE Score 4   Dressing/Undressing Clothing   Remove UB [address]; Undergarment;Socks   Don LB Clothes Pants; Undergarment;Socks; Shoes   Limitations Noted In Coordination   Lying to Sitting on Side of Bed   Type of Assistance Needed Independent   Lying to Sitting on Side of Bed CARE Score 6   Sit to Stand   Type of Assistance Needed Independent   Sit to Stand CARE Score 6   Bed-Chair Transfer   Type of Assistance Needed Independent   Chair/Bed-to-Chair Transfer CARE Score 6   [facility] Hygiene CARE Score 6 Toileting   Able to Pull Clothing down yes, up yes  Manage Hygiene Bladder; Bowel   Toilet Transfer   Type of Assistance Needed Independent   Toilet Transfer CARE Score 6   Toilet Transfer   Surface Assessed Standard Commode   Transfer Technique Stand Pivot   Light Housekeeping   Light Housekeeping Level of Assistance Distant supervision   Light Housekeeping retrieval of ADL items and cues to incorporate LUE into activites in increase coordination and fxl use   Coordination   Gross Motor card match with L hand to place and B hands for setup of task   Fine Motor knots in red tubing B hands with L hand as active assist to R hand, bolts placed into nuts with L hand using R hand at times to line up more difficult alignments   Cognition   Overall Cognitive Status WFL   Additional Activities   Additional Activities Other (Comment)   Additional Activities Comments fxl mobility without GME S/Mod I short distances only in room and hallway   Assessment   Treatment Assessment Pt participates in ADLs, mobility and coordination with BUE tasks this mornig with 42 minutes cncurrent treatment focusing on simalr goals of increasing activity tolerance and UB strength and coordination as another patient  Pt tolerate session well with improving although still presents as a deficit  Pt encouraged to use LUE during all tasks with good return demonstration  Pt will benefit form continued skilled OT services to increase activity tolerance with daily tasks and coordination for functional use for L hand  Problem List Decreased strength;Decreased endurance;Decreased coordination;Decreased safety awareness   Plan   Treatment/Interventions ADL retraining;Functional transfer training; Therapeutic exercise; Endurance training;Patient/family training; Compensatory technique education   Progress Progressing toward goals   OT Therapy Minutes   OT Time In 0933   OT Time Out 1036   OT Total Time (minutes) 63   OT Mode of treatment - Individual (minutes) 21   OT Mode of treatment - Concurrent (minutes) 42   Therapy Time missed   Time missed?  No

## 2019-10-22 NOTE — NURSING NOTE
Patient resting comfortably in bed at this time  No signs of distress noted  Patient continues with left sided weakness  Patient able to ambulate to the bathroom with supervision of one overnight  Patient with bed alarm in place to promote patient safety  No bowel movement noted at this time abdomen soft nontender bowel sounds audible pt reports passing flatus  Medications administered as ordered by hospitalist to promote bowel movements  Patient was able to reposition self frequently overnight to promote skin integrity  Call bell within reach  Will continue to monitor patient and follow plan of care

## 2019-10-22 NOTE — TEAM CONFERENCE
Acute RehabilitationTeam Conference Note  Date: 10/22/2019   Time: 10:45 AM       Patient Name:  Jeanie Copeland       Medical Record Number: [id number]   YOB: 1975  Sex: Male          Room/Bed:  /[facility] 215-01  Payor Info:  Payor: Benny Finch MA MCO / Plan: Roge Whitehead MA / Product Type: Medicaid HMO /      Admitting Diagnosis: Other nontraumatic intracerebral hemorrhage [I61 8]   Admit Date/Time:  10/19/2019  2:09 PM  Admission Comments: No comment available     Primary Diagnosis:  Intraparenchymal hemorrhage of brain ([facility] )  Principal Problem: Intraparenchymal hemorrhage of brain [facility])    Patient Active Problem List    Diagnosis Date Noted    Junctional bradycardia 10/19/2019    Thrombocytopenia ([facility] ) 10/19/2019    Essential hypertension 10/18/2019    Vertebral artery aneurysm ([facility] ) 10/17/2019    Chronic headache 10/16/2019    Depression 10/15/2019    Intraparenchymal hemorrhage of brain ([facility] ) 10/14/2019       Physical Therapy:    Transfers: Supervision  Bed Mobility: Supervision  Amulation Distance (ft): 200 feet  Ambulation: Supervision  Number of Stairs: 13  Assistive Device for Stairs: Lehft Hand Rail  Stair Assistance: Incidental Touching  Discharge Recommendations: Home with:  [address] with[de-identified] Outpatient Physical Therapy    10/21/19: Pt just recently evaluated  Completed all bed mobility, transfers, and ambulation with S assistance, and steps with CGA  At times Pt requires cueing for correct sequencing and safety  Pt remains limited overall by impaired L UE coordination, impaired balance, decreased endurance, and poor safety  Pt will benefit from skilled Physical Therapy to meet PT goals to ultimately restore Pt back to his PLOF       Occupational Therapy:  Eating: Supervision  Grooming: Supervision  Bathing: Supervision  Bathing: Supervision  Upper Body Dressing: Supervision  Lower Body Dressing: Supervision  Toileting: Supervision  Tub/Shower Transfer: Supervision  Toilet Transfer: Supervision  Cognition: Within Defined Limits  Orientation: Person, Place, Time, Situation  Discharge Recommendations: Home with:  [address] with[de-identified] Family Support, First Floor Setup, Home Occupational Therapy       10/21/19: Pt participates in ADLs, transfers and fine motor coordination tasks with LUE after evaluation this day  Pt requires supervision for ADLs and assist with fine motor tasks due to decreased coordination, safety and endurance  Pt's current LOF as listed above  Pt will benefit from continued skilled OT services to increase independence with daily tasks  Speech Therapy:  Mode of Communication: Verbal  Cognition: Within Defined Limits     Orientation: Person, Place, Time, Situation  Diet Recommendations: Regular Diet, Thin  Discharge Recommendations: Home Independently  Alphonzo Simmonds is a 45 y o  male who presented to the [facility] [address] with facial droop and L arm weakness and was found to have rt BG hemorrhage in the setting of elevated BP  Patient was managed non-operatively with BP control  There was concern for SI and psych did evaluate the patient and he was cleared from psych perspective  Prior to admission, patient was essentially independent with mobility, transfers and ADL's  Estefani Alejandro lives with his family (nephew and girlfriend) in a single family home  First floor set up is available  The patient will not have 24 hour supervision available upon discharge  Skilled speech assessment was completed  Patient presents with mild oral pharyngeal dysphagia secondary to left facial droop and numbness and mild incoordination and weakness  There is intermittent cough with thin liquids associated with mild pocketing left and larger sip size  He is able to protect the airway with strong cough  Speech is mildly slurred with associated anxiety    Speech comprehension and expression are supervision level at the multi step level due to distractibility and reduced sequencing  He benefits from repetition over time  Cognitively, patient presents as mod I for memory and reasoning his current situation  He is a good participant and uses visual aids well to maintain and understand his new therapy program   Skilled ST is recommended on the short term to address the above  Nursing Notes:  Appetite: Good  Diet Type: Regular/House                      Diet Patient/Family Education Complete: Yes                            Bladder: 7 - Complete [place]              Bowel Patient/Family Education: Yes  Pain Location: Head  Pain Orientation: Right, Frontal  Pain Score: 0                       Hospital Pain Intervention(s): Medication (See MAR), Rest  Pain Patient/Family Education: Yes  Medication Management/Safety  Medication Patient/Family Education Complete: Yes    10/21/19 Patient recent admission to [facility] following a hemorrhagic stroke  Patient with left sided weakness and left facial droop noted  Patient noted to pocket food at times in left side of mouth  Heart rate is regular  Lungs are clear on room air  Bed alarm in place to maintain patient safety  Medications as ordered for constipation per hospitalist audible bowel sounds abdomen is soft and nontender patient passing flatus  SCDs as ordered for DVT prophylaxis  Skin remains intact  No reports of pain  Supervision for transfers and ambulation  Case Management:     Discharge Planning  Living Arrangements: Family members  Support Systems: Parent, Family members, Friends/neighbors  Assistance Needed: n/a  Type of Current Residence: Private residence  Current Home Care Services: No  Initial assessment & orientation to [facility]  Pt resides in a multi story home with his nephew & nephew's significant other; can have first floor arrangements  Pt was completely independent PTA, no devices needed   He reported that he has been unable to work in years due to debilitating headaches, but is hopeful to eventually return to the workplace if possible  SW will continue to monitor & assist as needed with [address]  Is the patient actively participating in therapies? yes  List any modifications to the treatment plan: na    Barriers Interventions   Decreased end ADL, transfer, gait training   Decreased fine motor coordination Therapeutic exercise, therapeutic activity   Slurred speech, slight decreased cog ST strategies, ADL/transfer/gait training   Decreased balance Therapeutic exercise, therapeutic activity   dysphagia ST strategies, reg with thin     Is the patient making expected progress toward goals?  yes  List any update or changes to goals: na    Medical Goals: Patient will be medically stable for discharge to [facility] [place] upon completion of rehab program    Weekly Team Goals:   Rehab Team Goals  ADL Team Goal: Patient will be independent with ADLs with least restrictive device upon completion of rehab program  Bowel/Bladder Team Goal: Patient will return to premorbid level for bladder/bowel management upon completion of rehab program  Transfer Team Goal: Patient will be independent with transfers with least restrictive device upon completion of rehab program  Locomotion Team Goal: Patient will be independent with locomotion with least restrictive device upon completion of rehab program  Cognitive Team Goal: Patient will return to premorbid level of cognitive activity upon completion of rehab program     I self care, transfers, mobility  Mod I/I cog    Health and wellness: to be able to return to playing drums and working out    Discussion: Plan for return home with nephew with outpatient PT and OT    Anticipated Discharge Date:  Oct 24, 2019

## 2019-10-22 NOTE — PROGRESS NOTES
10/22/19 1305   Pain Assessment   Pain Assessment No/denies pain   Pain Score No Pain   Restrictions/Precautions   Precautions Fall Risk;Limb alert  (decreased L UE coordination)   Cognition   Overall Cognitive Status WFL   Following Commands Follows one step commands without difficulty   Roll Left and Right   Type of Assistance Needed Independent   Roll Left and Right CARE Score 6   Sit to Lying   Type of Assistance Needed Independent   Sit to Lying CARE Score 6   Lying to Sitting on Side of Bed   Type of Assistance Needed Independent   Lying to Sitting on Side of Bed CARE Score 6   Sit to Stand   Type of Assistance Needed Independent   Sit to Stand CARE Score 6   Bed-Chair Transfer   Type of Assistance Needed Independent   Chair/Bed-to-Chair Transfer CARE Score 6   Transfer Bed/Chair/Wheelchair   Limitations Noted In Balance; Endurance   Stand Pivot Modified Independent   Sit to Stand Modified Independent   Stand to Sit Modified Independent   Supine to Sit Modified Independent   Sit to Supine Modified Independent   All Transfer Modified Independent   Car Transfer   Reason if not Attempted Safety concerns   Car Transfer CARE Score 88   Walk 10 Feet   Type of Assistance Needed Independent   Walk 10 Feet CARE Score 6   Walk 50 Feet with Two Turns   Type of Assistance Needed Independent   Walk 50 Feet with Two Turns CARE Score 6   Walk 150 Feet   Type of Assistance Needed Independent   Walk 150 Feet CARE Score 6   Walking 10 Feet on Uneven Surfaces   Type of Assistance Needed Independent   Walking 10 Feet on Uneven Surfaces CARE Score 6   Ambulation   Does the patient walk? 2   Yes   Primary Mode of Locomotion Prior to Admission Walk   Distance Walked (feet)   (275' 350' )   Assist Device   (none)   Gait Pattern WNL   Walk Assist Level Supervision   Curb or Single Stair   Style negotiated Single stair   Type of Assistance Needed Supervision   1 Step (Curb) CARE Score 4   4 Steps   Type of Assistance Needed Supervision   4 Steps CARE Score 4   12 Steps   Type of Assistance Needed Supervision   12 Steps CARE Score 4   Stairs   Type Stairs   # of Steps   (15)   Assist Devices Single Rail   Therapeutic Interventions   Strengthening Seated TE each set performed to tolerance    Balance Standing dynamic balance/gait training with foucs on improving overall safety with functional mobility tasks    Other Gait training, transfer training, community re-integration    Assessment   Treatment Assessment Pt agreeable to participating in therapy today  Completed gait training, transfer training, community re-integration, seated TE, and standing dynamic balance activities to improve LE strength, balance, activity tolerance, and safety with functional mobility tasks  Pt remains limited by overall endurance and does require frequent rest breaks between mobility activities  During standing dynamic mobility activates today incorporated L UE reaching and throwing for improvements in overall coordination and function  PT Barriers   Physical Impairment Decreased endurance; Impaired balance;Decreased mobility   Functional Limitation Stair negotiation; Walking   Plan   Treatment/Interventions Functional transfer training;LE strengthening/ROM; Therapeutic exercise; Endurance training;Patient/family training;Bed mobility;Gait training; Compensatory technique education   Progress Progressing toward goals   PT Therapy Minutes   PT Time In 1305   PT Time Out 1406   PT Total Time (minutes) 61   PT Mode of treatment - Individual (minutes) 61   PT Mode of treatment - Concurrent (minutes) 0   PT Mode of treatment - Group (minutes) 0   PT Mode of treatment - Co-treat (minutes) 0   PT Mode of Treatment - Total time(minutes) 61 minutes   PT Cumulative Minutes 122   Therapy Time missed   Time missed?  No

## 2019-10-23 ENCOUNTER — TELEPHONE (OUTPATIENT)
Dept: NEUROSURGERY | Facility: CLINIC | Age: 44
End: 2019-10-23

## 2019-10-23 PROCEDURE — 97112 NEUROMUSCULAR REEDUCATION: CPT

## 2019-10-23 PROCEDURE — 97116 GAIT TRAINING THERAPY: CPT

## 2019-10-23 PROCEDURE — 99232 SBSQ HOSP IP/OBS MODERATE 35: CPT | Performed by: PHYSICAL MEDICINE & REHABILITATION

## 2019-10-23 PROCEDURE — 92507 TX SP LANG VOICE COMM INDIV: CPT

## 2019-10-23 PROCEDURE — 92526 ORAL FUNCTION THERAPY: CPT

## 2019-10-23 PROCEDURE — 97530 THERAPEUTIC ACTIVITIES: CPT

## 2019-10-23 PROCEDURE — 97110 THERAPEUTIC EXERCISES: CPT

## 2019-10-23 PROCEDURE — 97535 SELF CARE MNGMENT TRAINING: CPT

## 2019-10-23 RX ORDER — ASPIRIN 81 MG/1
81 TABLET ORAL DAILY
Status: DISCONTINUED | OUTPATIENT
Start: 2019-10-24 | End: 2019-10-24 | Stop reason: HOSPADM

## 2019-10-23 RX ADMIN — NORTRIPTYLINE HYDROCHLORIDE 10 MG: 10 CAPSULE ORAL at 21:22

## 2019-10-23 RX ADMIN — AMLODIPINE BESYLATE 10 MG: 5 TABLET ORAL at 09:22

## 2019-10-23 RX ADMIN — LISINOPRIL 20 MG: 20 TABLET ORAL at 09:22

## 2019-10-23 RX ADMIN — ACETAMINOPHEN 975 MG: 325 TABLET ORAL at 14:24

## 2019-10-23 NOTE — PROGRESS NOTES
Physical Medicine and Rehabilitation Progress Note  Agustina Mccoy 45 y o  male MRN: [id number]  Unit/Bed#: -01 Encounter: [id number]    HPI: Agustina Mccoy is a 45 y o  male who presented to the [email or web address] with facial droop and L arm weakness and was found to have rt BG hemorrhage in the setting of elevated BP  Patient was managed non-operatively with BP control  There was concern for SI and psych did evaluate the patient and he was cleared from psych perspective  Chief Complaint: ICH    Interval/Subjective: patient looking forward to dc tomorrow     ROS: A 10 point ROS was performed; negative except as noted above       Assessment/Plan:      Vertebral artery aneurysm (HCC)  Assessment & Plan  - Rt vertebral artery aneurysm  - FU CTA ordered per Neuro[facility] request which revealed outpouching of the right vertebral artery and slightly evolving Rt BG hemorrhage; neurosx reviewed CTA and recommend patient be started on ASA 81 mg qd for right vertebral artery aneurysm and FU with CTA in approximally 3 months with OP FU with them at that time (CTA e-prescribed in EMR by neurosx team) and had no further recommendations regarding ICH and recommend OP FU with neurology  - OP FU with Dr Octavio Oro (neurosx) on 1/24/20 and Dr Bebo Anguiano (neurology) 1/3/20       * Intraparenchymal hemorrhage of brain ([facility] )  Assessment & Plan  - rt BG   - non-op management per neurosx  - BP control   - FU CTA ordered per neurosx request which revealed outpouching of the right vertebral artery and slightly evolving Rt BG hemorrhage; neurosx reviewed CTA and recommend patient be started on ASA 81 mg qd for right vertebral artery aneurysm and FU with CTA in approximally 3 months with OP FU with them at that time (CTA e-prescribed in EMR by neurosx team), neurosx has no further recommendations for ICH and recommends OP FU with neurology   - OP FU with Dr Octavio Oro (neurosx) on 1/24/20 and Dr Bebo Anguiano (neurology) on 1/3/20    Thrombocytopenia (HCC)  Assessment & Plan  - mild at 145 ()  - IM monitoring and has cleared patient for dc with recommendation for OP FU with PCP with further testing/treatment and/or specialist referral at PCP's discretion       Junctional bradycardia  Assessment & Plan  - not currently bradycardic and has not been bradycardic during rehab admission per IM recommends OP FU with PCP with further testing/treatment and/or specialist referral at PCP's discretion       Essential hypertension  Assessment & Plan  - started on lisinopril 20 mg qd and norvasc 10 mg qd in acute care--> IM managing and recommend patient be dc'd on this regimen     Chronic headache  Assessment & Plan  - chronic almost daily headache since 2012   - does not recall any head trauma at that time  - without migraneous features appears to be more tension type  - completed short course of steroids   - started on zyprexa for mood d/o in acute care by psych but on 10/22 d/w Dr Wayne Otto of psych and recommended dc'ing zyprexa and has cleared patient from psych standpoint to be off of psych meds from psych standpoint  - stated on lisinopril for HTN in acute care by IM   - started on nortriptyline for headaches as well in acute care by neuro service   - has tried topomax and propranolol in the remote past which were not effective however dose and length of treatment are unknown    - prn reglan (was receiving reglan in acute care and patient tolerated w/o AE)   - neuro in acute care recommended repeat MRI in approximately 3 months as OP (e-prescribed by neuro team in EMR)  - OP neuro FU (in acute care FU was arranged with Dr Katherine Carter for 1/3/20)     Depression  Assessment & Plan  - started on zyprexa 2 5 mg HS by psych however d/c Dr Wayne Otto of psych and cleared patient to stop zyprexa and remain off meds from psych standpoint   - OP FU with psych      Scheduled Meds:    Current Facility-Administered Medications:  acetaminophen 975 mg Oral Q8H PRN Kenji Bee MD   amLODIPine 10 mg Oral Daily MD Mak Neumann ON 10/24/2019] aspirin 81 mg Oral Daily Kenji Bee MD   hydrALAZINE 10 mg Oral Q8H PRN KOFFI Mcnamara   lisinopril 20 mg Oral Daily Kenji Bee MD   metoclopramide 10 mg Oral BID PRN Kenji Bee MD   nortriptyline 10 mg Oral HS Kenji Bee MD   polyethylene glycol 17 g Oral Daily PRN Kenji Bee MD   senna-docusate sodium 1 tablet Oral HS Danis Christianson PA-C          DVT ppx: patient is ambulatory     Objective:    Functional Update:  Mobility: I   Transfers:  I   ADLs: I         Physical Exam:    T: 97 9  HR: 77  BP: 114/84  RR: 18  POx: 96%        General: alert, no apparent distress, cooperative and comfortable  HEENT:  Head: Normal, normocephalic, atraumatic    CARDIAC:  +S1/2  LUNGS:  respirations unlabored   ABDOMEN:  soft NT   EXTREMITIES:  volume status currently stable   NEURO:   mental status, speech normal, alert and oriented x3  PSYCH:  mood/affect currently stable          Laboratory:         Invalid input(s): PLTCT        Invalid input(s): CA

## 2019-10-23 NOTE — CASE MANAGEMENT
Tx team recommendations reviewed  Dc planned for 10/24 at 1 PM  Pt will be transported home by car with family, which Tx team has deetrmined to be a safe mode of transport  Outpatient PT, OT, & ST to be provided by  in [address] [place] [address] per Pt preference; referral made & they will call Pt to schedule  Additional FU appts indicated on AVS, to be scheduled by Pt per his scheduling preferences  SW will continue to monitor & assist as needed with Tx & DC planning  SW also reached out to representative, Thuan Or at [facility] at [phone], who verified that the retro Azul Patterson is being reviewed by Christiano Lopez in [address]  Christiano Lopez will call this worker back when there has been a determination  Reference number is [id number]  Addendum: Received call from [address] [place] [address]  Pt is APPROVED   Nannette Buchanan [phone]

## 2019-10-23 NOTE — PROGRESS NOTES
10/23/19 0845   Pain Assessment   Pain Assessment No/denies pain   Pain Score No Pain   Restrictions/Precautions   Precautions Fall Risk;Limb alert   Cognition   Overall Cognitive Status WFL   Arousal/Participation Alert; Cooperative   Orientation Level Oriented X4   Following Commands Follows all commands and directions without difficulty   Roll Left and Right   Type of Assistance Needed Independent   Roll Left and Right CARE Score 6   Sit to Lying   Type of Assistance Needed Independent   Sit to Lying CARE Score 6   Lying to Sitting on Side of Bed   Type of Assistance Needed Independent   Lying to Sitting on Side of Bed CARE Score 6   Sit to Stand   Type of Assistance Needed Independent   Sit to Stand CARE Score 6   Bed-Chair Transfer   Type of Assistance Needed Independent   Chair/Bed-to-Chair Transfer CARE Score 6   Transfer Bed/Chair/Wheelchair   Limitations Noted In Balance; Endurance   Stand Pivot Independent   Sit to Stand Independent   Stand to Sit Independent   Supine to Sit Independent   Sit to Supine Independent   All Transfer Independent   Walk 10 Feet   Type of Assistance Needed Independent   Walk 10 Feet CARE Score 6   Walk 50 Feet with Two Turns   Type of Assistance Needed Independent   Walk 50 Feet with Two Turns CARE Score 6   Walk 150 Feet   Type of Assistance Needed Independent   Walk 150 Feet CARE Score 6   Walking 10 Feet on Uneven Surfaces   Type of Assistance Needed Independent   Walking 10 Feet on Uneven Surfaces CARE Score 6   Ambulation   Does the patient walk? 2  Yes   Primary Mode of Locomotion Prior to Admission Walk   Distance Walked (feet)   (500' 250')   Gait Pattern WNL   Limitations Noted In Endurance; Safety   Walk Assist Level Independent   Wheel 50 Feet with Two Turns   Reason if not Attempted Activity not applicable   Wheel 50 Feet with Two Turns CARE Score 9   Wheel 150 Feet   Reason if not Attempted Activity not applicable   Wheel 625 Feet CARE Score 9   Wheelchair mobility Does the patient use a wheelchair? 0  No   Curb or Single Stair   Style negotiated Single stair   Type of Assistance Needed Independent   1 Step (Curb) CARE Score 6   4 Steps   Type of Assistance Needed Independent   4 Steps CARE Score 6   12 Steps   Type of Assistance Needed Independent   12 Steps CARE Score 6   Stairs   Type Stairs   # of Steps   (30)   Assist Devices Single Rail   Findings mod I    Therapeutic Interventions   Strengthening Seated/standing TE for general LE strengthening/conditioning  (2# ankle weight/ blue TB added for increased resistance)   Balance Standing dynamic balance/gait training with focus on improving overall safety with functonal mobility tasks    Other Gait training, transfer training, community re-integration    Assessment   Treatment Assessment Pt participates in functional mobility training with focus on improving overall endurance/activity tolerance, and safety  Pt is mod I/Independent with all mobility tasks listed above  He has made very good progress towards PT goals  Completed seated/standing TE and dynamic balance training with good tolerance  Pt needed frequent seated rest breaks between activities due to increased muscular fatigue and poor endurance  Educated Pt on HEP and provided therapy band to facilitate continued strengthening and balance training with good verbal understanding  Kacy La remains limited by overall activity tolerance, impaired balance, impaired strength, and decreased endurance; he will benefit from continued outpatient skilled PT services to restore Pt back to his PLOF  PT Barriers   Physical Impairment Decreased strength;Decreased endurance   Plan   Treatment/Interventions LE strengthening/ROM; Therapeutic exercise; Endurance training   Progress Improving as expected   Recommendation   Recommendation Outpatient PT   Equipment Recommended   (none)   PT - OK to Discharge Yes   Discharge Summary Pt is cleared for discharge to home with all goals met  Recommend outpatient PT to continue working on strengthening and maximizing endurance for return to PLOF  No DME ordered at this time  Pt is mod I/independet with all functional mobility tasks listed above  PT Therapy Minutes   PT Time In 0845   PT Time Out 0952   PT Total Time (minutes) 67   PT Mode of treatment - Individual (minutes) 67   PT Mode of treatment - Concurrent (minutes) 0   PT Mode of treatment - Group (minutes) 0   PT Mode of treatment - Co-treat (minutes) 0   PT Mode of Treatment - Total time(minutes) 67 minutes   PT Cumulative Minutes 189   Therapy Time missed   Time missed?  No

## 2019-10-23 NOTE — PROGRESS NOTES
10/22/19 1955   Charting Type   Charting Type Shift assessment   Neurological   Neuro (WDL) X   Level of Consciousness Alert/awake   Orientation Level Oriented X4   Cognition Appropriate judgement; Appropriate safety awareness; Appropriate attention/concentration; Follows commands   Extraocular Movements Full   Right Upper Visual Fields Intact   Facial Symmetry Left facial drooping   Tongue Deviation Midline   Speech Clear   Swallow Able to swallow solids and liquids without difficulty   Pupil Assessment Yes   R Pupil Size (mm) 3   R Pupil Shape Round   R Pupil Reaction Brisk   L Pupil Size (mm) 3   L Pupil Shape Round   L Pupil Reaction Brisk   Muscle Function/Sensation Assessment ;Motor response;Muscle strength;Sensation   R Hand  Strong   L Hand  Moderate   R Foot Dorsiflexion Strong   L Foot Dorsiflexion Moderate   R Foot Plantar Flexion Strong   L Foot Plantar Flexion Moderate   RUE Motor Response Responds to commands;Normal extension;Normal flexion; Purposeful movement   RUE Sensation Full sensation   RUE Muscle Strength 5- Normal strength   LUE Motor Response Responds to commands;Normal extension;Normal flexion; Purposeful movement   LUE Sensation Full sensation   LUE Muscle Strength 4- Movement against gravity and limited resistance   RLE Motor Response Responds to commands;Normal extension;Normal flexion; Purposeful movement   RLE Sensation Full sensation   RLE Muscle Strength 5- Normal strength   LLE Motor Response Responds to commands;Normal flexion; Normal extension; Purposeful movement   LLE Sensation Full sensation   LLE Muscle Strength 4- Movement against gravity and limited resistance   Neuro Symptoms None   Relieved by Rest   Neuro Additional Assessments No   Reflexes   Gag Present   Cough Present   Jacob Coma Scale   Eye Opening 4   Best Verbal Response 5   Best Motor Response 6   Jacob Coma Scale Score 15   HEENT   HEENT (WDL) X   Head and Face Asymmetrical   Respiratory   Respiratory (WDL) WDL   Respiratory Pattern Normal   Chest Assessment Chest expansion symmetrical   Bilateral Breath Sounds Clear   Cardiac   Cardiac (WDL) WDL   Cardiac Regularity Regular   Peripheral Vascular   Peripheral Vascular (WDL) WDL   Sushil Scale   Sensory Perceptions 3   Moisture 4   Activity 4   Mobility 3   Nutrition 3   Friction and Shear 3   Sushil Scale Score 20   Musculoskeletal   Musculoskeletal (WDL) X   Level of Assistance Standby assist, set-up cues, supervision of patient - no hands on   Assistive Device None   RUE Full movement   LUE Limited movement   RLE Full movement   LLE Limited movement   Musculoskeletal Additional Assessments No   Gastrointestinal   Gastrointestinal (WDL) X   Abdomen Inspection Soft;Nondistended   Bowel Sounds (All Quadrants) Normoactive   Tenderness Nontender   Stool Assessment   Bowel Incontinence No   Urine Assessment   Urinary Incontinence No

## 2019-10-23 NOTE — TELEPHONE ENCOUNTER
12/06/2019-CTA IS SCHEDULED ON 01/02/2020 11/11/2019-CALLED PT AND CONFIRMED 01/24/2020 APT  PT STATES HE HAS TO CONTACT HIS TRANSPORTATION AND WORK WITH THEM IN REGARDS TO SCHEDULING CTA HEAD & NECK  PT GIVEN [facility] SCHEDULING#     WAITING FOR CTA TO BE SCHEDULED  [facility]        10/31/2019-CALLED PT AGAIN TO CONFIRM 01/24/2020 APT AND SCHEDULE CTA HEAD & NECK, BUT PHONE KEPT RINGING, NO ANSWER, NO MACHINE TO LEAVE MESSAGE ON       10/30/2019-CALLED PT TO CONFIRM 01/24/2020 APT AND SCHEDULE CTA HEAD & NECK, BUT PHONE KEPT RINGING, NO ANSWER, NO MACHINE TO LEAVE MESSAGE ON       10/24/2019-PT Paxton Sawyer    10/23/2019-PT Paxton Sawyer  SCHEDULED APT W/DR Mitali Espinosa ON 01/24/2020 AT 9:30am     ----- Message from Delaney Short PA-C sent at 10/23/2019  3:53 PM EDT -----  Regarding: f/u   3 month office hospital follow up with Carrillo Mass  Order placed for CTA head and neck

## 2019-10-23 NOTE — PROGRESS NOTES
10/23/19 1400   Pain Assessment   Pain Assessment 0-10   Pain Score 6   Pain Location Head   Restrictions/Precautions   Precautions Limb alert   Lying to Sitting on Side of Bed   Type of Assistance Needed Independent   Lying to Sitting on Side of Bed CARE Score 6   Sit to Stand   Type of Assistance Needed Independent   Sit to Stand CARE Score 6   Bed-Chair Transfer   Type of Assistance Needed Independent   Chair/Bed-to-Chair Transfer CARE Score 6   Additional Activities   Additional Activities Other (Comment)   Additional Activities Comments fxl mobility without DME in rom and hallway   Assessment   Treatment Assessment Pt partiicpates in a portion of the [employer] group with 20 minutes of activity before return to room due to oncoming headache with meds requested and rest in bed also requested  Pt particiaptes as able and is disappointed when the session was cut short because of not feeling well  Pt returned to room to rest with education provided regarding self pacing reviewed  Problem List Decreased endurance;Decreased coordination   Plan   Treatment/Interventions ADL retraining;Functional transfer training; Therapeutic exercise; Endurance training;Patient/family training; Compensatory technique education   Progress Progressing toward goals   OT Therapy Minutes   OT Time In 1400   OT Time Out 1420   OT Total Time (minutes) 20   OT Mode of treatment - Individual (minutes) 20   Therapy Time missed   Time missed?  No

## 2019-10-23 NOTE — TREATMENT PLAN
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 just prior to vertebral artery transversing intracranially over lateral mass of C1  No evidence of acute intracranial vascular disease  Right basal ganglia hypertensive hemorrhage appears to be resolving compared to previous CT on 10/17/2019  Current recommendations would be for lifelong aspirin therapy presently  Would recommend ongoing neurology follow-up for hypertensive stroke  No further neurosurgical recommendations in regards to hemorrhage  We will schedule patient repeat CTA in 3 months with neurosurgical clinical follow-up following completion of imaging in the outpatient setting  Call for any further questions or concerns

## 2019-10-23 NOTE — PROGRESS NOTES
Pt feeling very fatiqued after therapy   Informed by staff he needs to increase his endurance with continued therapy which can take some time  Stroke education booklet given and reviewed with pt along with medications and side effects and ongoing safety  Tolerating therapy  Independent with ambulation, toileting  For discharge home tomorrow

## 2019-10-23 NOTE — INCIDENTAL FINDINGS
1) 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: Please follow up with your primary care provider for further testing/treatment if any and/or specialist referral if any as they deem necessary     2) low platelet cell count: Please follow up with your primary care provider for further testing/treatment if any and/or specialist referral if any as they deem necessary

## 2019-10-23 NOTE — PROGRESS NOTES
10/23/19 0746   Charting Type   Charting Type Shift assessment   Neurological   Neuro (WDL) X   Level of Consciousness Alert/awake   Orientation Level Oriented X4   Cognition Appropriate judgement   Extraocular Movements Full   Facial Symmetry Left facial drooping   R Pupil Size (mm) 3   L Pupil Size (mm) 3   LUE Muscle Strength 4- Movement against gravity and limited resistance   LLE Muscle Strength 4- Movement against gravity and limited resistance   Neuro Symptoms None   Neuro Additional Assessments No   Jacob Coma Scale   Eye Opening 4   Best Verbal Response 5   Best Motor Response 6   Jacob Coma Scale Score 15   HEENT   HEENT (WDL) WDL   Head and Face Asymmetrical   L Eye Blurred   Vision - Corrective Lenses (at bedside) Glasses   R Ear Intact   L Ear Intact   Teeth Intact   Respiratory   Respiratory (WDL) WDL   Respiratory Pattern Normal   Chest Assessment Chest expansion symmetrical   Respiratory Interventions   Respiratory Interventions Cough and deep breathe;Incentive spirometry   Cough and Deep Breathe   Cough and Deep Breathe Yes   Cardiac   Cardiac (WDL) WDL   Cardiac Regularity Regular   Cardiac Symptoms None   Pacemaker/ICD No   Pain Assessment   Pain Assessment No/denies pain   Pain Score No Pain   Peripheral Vascular   Peripheral Vascular (WDL) WDL   RUE Neurovascular Assessment   R Radial Pulse +2   LUE Neurovascular Assessment   L Radial Pulse +2   RLE Neurovascular Assessment   R Pedal Pulse +2   LLE Neurovascular Assessment   L Pedal Pulse +2   Integumentary   Integumentary (WDL) WDL   Skin Condition/Temp Warm;Dry   Tattoos/Piercings   Does patient have tattoos?  Yes   Musculoskeletal   Musculoskeletal (WDL) X   Level of Assistance Independent   Assistive Device None   LUE Limited movement  (states arm drags, weakmness )   LLE Limited movement   Gastrointestinal   Gastrointestinal (WDL) X   Last BM Date 10/22/19   Gastrointestinal Additional Assessments No   Bowel Shift Assessment   Bowel Incontinence No   Stool Assessment   Bowel Incontinence No   Genitourinary   Genitourinary (WDL) WDL   Bladder Shift Assessment   Bladder Device None   Bladder Management Independent   Urine Assessment   Urinary Incontinence No   Genitalia   Male Genitalia Intact   Anal/Rectal   Anal/Rectal (WDL) WDL   Psychosocial   Psychosocial (WDL) WDL   Needs Expressed Denies   Ability to Express Feelings Able to express   Ability to Express Needs Able to express   Ability to Express Thoughts Able to express   Ability to Understand Others Understands

## 2019-10-23 NOTE — PROGRESS NOTES
10/23/19 1035   Pain Assessment   Pain Assessment No/denies pain   Restrictions/Precautions   Precautions Limb alert  (decreased L hand coordination)   Eating   Type of Assistance Needed Independent   Eating CARE Score 6   Oral Hygiene   Type of Assistance Needed Independent   Oral Hygiene CARE Score 6   Shower/Bathe Self   Type of Assistance Needed Independent   Shower/Bathe Self CARE Score 6   Bathing   Assessed Bath Style Shower   Anticipated D/C Bath Style Shower   Able to [place] Shar Yes   Able to [employer] Temperature Yes   Able to Wash/Rinse/Dry (body part) Left Arm;Right Arm;L Upper Leg;R Upper Leg;R Lower Leg/Foot;L Lower Leg/Foot;Chest;Abdomen;Perineal Area; Buttocks   Tub/Shower Transfer   Adaptive Equipment Grab Bars   Findings Mod I   Upper Body Dressing   Type of Assistance Needed Independent   Upper Body Dressing CARE Score 6   Lower Body Dressing   Type of Assistance Needed Independent   Lower Body Dressing CARE Score 6   Putting On/Taking Off Footwear   Type of Assistance Needed Independent   Putting On/Taking Off Footwear CARE Score 6   Picking Up Object   Type of Assistance Needed Independent   Picking Up Object CARE Score 6   Dressing/Undressing Clothing   Remove UB Clothes Pullover Shirt   Don UB Clothes Pullover Shirt   Remove LB Clothes Pants; Undergarment;Socks; Shoes   Don LB Clothes Pants;Socks; Undergarment; Shoes   Limitations Noted In Coordination   Sit to Stand   Type of Assistance Needed Independent   Sit to Stand CARE Score 6   Bed-Chair Transfer   Type of Assistance Needed Independent   Chair/Bed-to-Chair Transfer CARE Score 6   [place] Hygiene CARE Score 6   Toileting   Able to [address] down yes, up yes     Manage Hygiene Bowel;Bladder   Limitations Noted In Coordination   Toilet Transfer   Type of Assistance Needed Independent   Toilet Transfer CARE Score 6   Toilet Transfer   Surface Assessed Standard Toilet Transfer Technique Stand Pivot   Light Housekeeping   Light Housekeeping Level of Assistance Independent   Light Housekeeping collecting and transport of ADL items   Coordination   Fine Motor Purdue Pegboard L hand with minimal difficulty improved form evaluation   Cognition   Overall Cognitive Status WFL   Additional Activities   Additional Activities Other (Comment)   Additional Activities Comments fxl mobility without DME I   Assessment   Treatment Assessment Pt participates in ADLs, trnasfers/ mobility and LUE fine motor coordination with an HEP issued and reviewed  Pt tolerates session with 25 minutes concurrent treatment focusing on similar goals as nother to increase activity tolerance for ADL completion  Pt reports extreme fatigue at end of the session requiring a rest break before returning to room after a shower and FM tasks  Nursing notified and pt educated about paceing during the day with activity until activity tolerance returns  Educaiton regarding LUE coordination and functional use alos reviwed to prepare for dischargee tomorrow  No concerns about discharge expressed  Problem List Decreased coordination;Decreased endurance   Plan   Treatment/Interventions ADL retraining;Functional transfer training; Therapeutic exercise; Endurance training;Patient/family training; Compensatory technique education   Progress Progressing toward goals   Recommendation   OT - OK to Discharge Yes   Discharge Summary Pt participates in ADL, transfers/ mobility, homemaking and L hand coordination tasks  Pt has demonstrated improvements with coordination and activity tolerance although education provided for comtinued improvements at home  No DME required and Outpatient OT recommended  Pt has met all goals and is slated for discharge to home tomorrow 10/24/19     OT Therapy Minutes   OT Time In 2655   OT Time Out 1130   OT Total Time (minutes) 55   OT Mode of treatment - Individual (minutes) 30   OT Mode of treatment - Concurrent (minutes) 25   Therapy Time missed   Time missed?  No

## 2019-10-24 VITALS
TEMPERATURE: 97.8 F | RESPIRATION RATE: 18 BRPM | OXYGEN SATURATION: 97 % | DIASTOLIC BLOOD PRESSURE: 86 MMHG | HEART RATE: 69 BPM | WEIGHT: 153.7 LBS | HEIGHT: 67 IN | SYSTOLIC BLOOD PRESSURE: 140 MMHG | BODY MASS INDEX: 24.12 KG/M2

## 2019-10-24 PROCEDURE — 99239 HOSP IP/OBS DSCHRG MGMT >30: CPT | Performed by: PHYSICAL MEDICINE & REHABILITATION

## 2019-10-24 RX ORDER — AMLODIPINE BESYLATE 10 MG/1
10 TABLET ORAL DAILY
Qty: 30 TABLET | Refills: 0 | Status: SHIPPED | OUTPATIENT
Start: 2019-10-25

## 2019-10-24 RX ORDER — ASPIRIN 81 MG/1
81 TABLET ORAL DAILY
Qty: 30 TABLET | Refills: 0 | Status: SHIPPED | OUTPATIENT
Start: 2019-10-25

## 2019-10-24 RX ORDER — METOCLOPRAMIDE 10 MG/1
10 TABLET ORAL 2 TIMES DAILY PRN
Qty: 15 TABLET | Refills: 0 | Status: SHIPPED | OUTPATIENT
Start: 2019-10-25

## 2019-10-24 RX ORDER — LISINOPRIL 20 MG/1
20 TABLET ORAL DAILY
Qty: 30 TABLET | Refills: 0 | Status: SHIPPED | OUTPATIENT
Start: 2019-10-25 | End: 2020-01-24

## 2019-10-24 RX ORDER — NORTRIPTYLINE HYDROCHLORIDE 10 MG/1
10 CAPSULE ORAL
Qty: 30 CAPSULE | Refills: 0 | Status: SHIPPED | OUTPATIENT
Start: 2019-10-24

## 2019-10-24 RX ADMIN — ASPIRIN 81 MG: 81 TABLET, COATED ORAL at 09:42

## 2019-10-24 RX ADMIN — AMLODIPINE BESYLATE 10 MG: 5 TABLET ORAL at 09:41

## 2019-10-24 RX ADMIN — LISINOPRIL 20 MG: 20 TABLET ORAL at 09:42

## 2019-10-24 NOTE — PROGRESS NOTES
10/23/19 2059   Charting Type   Charting Type Shift assessment   Neurological   Neuro (WDL) X   Level of Consciousness Alert/awake   Orientation Level Oriented X4   Cognition Appropriate judgement; Appropriate safety awareness; Appropriate attention/concentration; Follows commands   Facial Symmetry Left facial drooping   Tongue Deviation Midline   Speech Clear   Pupil Assessment Yes   R Pupil Size (mm) 3   R Pupil Shape Round   R Pupil Reaction Brisk   L Pupil Size (mm) 3   L Pupil Shape Round   L Pupil Reaction Brisk   Muscle Function/Sensation Assessment ;Motor response;Muscle strength;Sensation   R Hand  Strong   L Hand  Moderate   R Foot Dorsiflexion Strong   L Foot Dorsiflexion Moderate   R Foot Plantar Flexion Strong   L Foot Plantar Flexion Moderate   RUE Motor Response Responds to commands;Normal extension;Normal flexion; Purposeful movement   RUE Sensation Full sensation   RUE Muscle Strength 5- Normal strength   LUE Motor Response Responds to commands;Normal extension;Normal flexion; Purposeful movement   LUE Sensation Full sensation   LUE Muscle Strength 4- Movement against gravity and limited resistance   RLE Motor Response Responds to commands;Normal extension;Normal flexion; Purposeful movement   RLE Sensation Full sensation   RLE Muscle Strength 5- Normal strength   LLE Motor Response Responds to commands;Normal flexion; Normal extension; Purposeful movement   LLE Sensation Full sensation   LLE Muscle Strength 4- Movement against gravity and limited resistance   Neuro Symptoms None   Neuro Additional Assessments No   Reflexes   Gag Present   Cough Present   [place] Coma Scale   Eye Opening 4   Best Verbal Response 5   Best Motor Response 6   Jacob Coma Scale Score 15   HEENT   HEENT (WDL) X   Head and Face Asymmetrical   L Eye Blurred   Respiratory   Respiratory (WDL) WDL   Respiratory Pattern Normal   Chest Assessment Chest expansion symmetrical   Bilateral Breath Sounds Clear   Respiratory Interventions   Respiratory Interventions Cough and deep breathe   Incentive Spirometry   IS level of assistance Independent   Frequency q2hr W/A   Respiratory Effort Normal   Treatment Tolerance Tolerated well   Incentive Spirometry Goal (mL) 2500 mL   Incentive Spirometry Achieved (mL) 2500 mL   Cardiac   Cardiac (WDL) WDL   Cardiac Regularity Regular   Pain Assessment   Pain Assessment 0-10   Peripheral Vascular   Peripheral Vascular (WDL) WDL   RLE Neurovascular Assessment   R Pedal Pulse +2   LLE Neurovascular Assessment   L Pedal Pulse +2   Integumentary   Integumentary (WDL) WDL   Skin Color Appropriate for ethnicity   Skin Condition/Temp Warm;Dry   Sushil Scale   Sensory Perceptions 3   Moisture 4   Activity 4   Mobility 3   Nutrition 3   Friction and Shear 3   Sushil Scale Score 20   Musculoskeletal   Musculoskeletal (WDL) X   RUE Full movement   LUE Limited movement   RLE Full movement   LLE Limited movement   Musculoskeletal Additional Assessments No   Gastrointestinal   Gastrointestinal (WDL) X   Abdomen Inspection Soft;Nondistended   Bowel Sounds (All Quadrants) Normoactive   Tenderness Nontender   Stool Assessment   Bowel Incontinence No   Urine Assessment   Urinary Incontinence No

## 2019-10-24 NOTE — DISCHARGE SUMMARY
Physical Medicine and Rehabilitation Progress Note  Harper White 45 y o  male MRN: [id number]  Unit/Bed#: -01 Encounter: [id number]  Discharge Summary - PMR           Admission Date:    10/19/19  Discharge Date:   10/24/19    Diagnosis:   ICH    Functional Status Upon Discharge:   Independent mobility/tx/ADLs     Condition at Discharge: stable    Discharge instructions/Information to patient and family:   See after visit summary for information provided to patient and family  Provisions for Follow-Up Care:  See after visit summary for information related to follow-up care and any pertinent home health orders  Disposition: home with family     Planned Readmission: no        Discharge Medications:  See after visit summary for reconciled discharge medications provided to patient and family  Comorbidities:   See below for medical details     Hospital Course: The patient had an unremarkable rehab course with significant functional gains made, please see below for medical details:        Harper White is a 45 y o  male who presented to the [address] with facial droop and L arm weakness and was found to have rt BG hemorrhage in the setting of elevated BP  Patient was managed non-operatively with BP control  There was concern for SI and psych did evaluate the patient and he was cleared from psych perspective            Vertebral artery aneurysm (HCC)  Assessment & Plan  - Rt vertebral artery aneurysm  - FU CTA ordered per Neurosx request which revealed outpouching of the right vertebral artery and slightly evolving Rt BG hemorrhage; neurosx reviewed CTA and recommend patient be started on ASA 81 mg qd for right vertebral artery aneurysm and FU with CTA in approximally 3 months with OP FU with them at that time (CTA e-prescribed in EMR by neurosx team) and had no further recommendations regarding ICH and recommend OP FU with neurology  - OP FU with Dr Kajal Crabtree (neurosx) on 1/24/20 and Dr Chuy Iqbal (neurology) 1/3/20       * Intraparenchymal hemorrhage of brain ([facility] [address] )  Assessment & Plan  - rt BG   - non-op management per neurosx  - BP control   - FU CTA ordered per neurosx request which revealed outpouching of the right vertebral artery and slightly evolving Rt BG hemorrhage; neurosx reviewed CTA and recommend patient be started on ASA 81 mg qd for right vertebral artery aneurysm and FU with CTA in approximally 3 months with OP FU with them at that time (CTA e-prescribed in EMR by neurosx team), neurosx has no further recommendations for ICH and recommends OP FU with neurology   - OP FU with Dr Gerardo Nettles (neurosx) on 1/24/20 and Dr Chuy Iqbal (neurology) on 1/3/20    Thrombocytopenia ([facility] [address] )  Assessment & Plan  - mild at 145 ()  - IM monitoring and has cleared patient for dc with recommendation for OP FU with PCP with further testing/treatment and/or specialist referral at PCP's discretion       Junctional bradycardia  Assessment & Plan  - not currently bradycardic and has not been bradycardic during rehab admission per IM recommends OP FU with PCP with further testing/treatment and/or specialist referral at PCP's discretion       Essential hypertension  Assessment & Plan  - started on lisinopril 20 mg qd and norvasc 10 mg qd in acute care--> IM managing and recommend patient be dc'd on this regimen     Chronic headache  Assessment & Plan  - chronic almost daily headache since 2012   - does not recall any head trauma at that time  - without migraneous features appears to be more tension type  - completed short course of steroids   - started on zyprexa for mood d/o in acute care by psych but on 10/22 d/w Dr Lissette Wilson of psych and recommended dc'ing zyprexa and has cleared patient from psych standpoint to be off of psych meds from psych standpoint  - stated on lisinopril for HTN in acute care by IM   - started on nortriptyline for headaches as well in acute care by neuro service   - has tried topomax and propranolol in the remote past which were not effective however dose and length of treatment are unknown    - prn reglan (was receiving reglan in acute care and patient tolerated w/o AE)   - neuro in acute care recommended repeat MRI in approximately 3 months as OP (e-prescribed by neuro team in EMR)  - OP neuro FU (in acute care FU was arranged with Dr Buzz Goldmann for 1/3/20)     Depression  Assessment & Plan  - started on zyprexa 2 5 mg HS by psych however d/c Dr Kamilah Hernández of psych and cleared patient to stop zyprexa and remain off meds from psych standpoint   - OP FU with psych      Scheduled Meds:    Current Facility-Administered Medications:  acetaminophen 975 mg Oral Q8H PRN Marino Hansen, MD   amLODIPine 10 mg Oral Daily Marino Hansen, MD   aspirin 81 mg Oral Daily Marino Hansen, MD   hydrALAZINE 10 mg Oral Q8H PRN Madie Prudent, CRNP   lisinopril 20 mg Oral Daily Marino Hansen MD   metoclopramide 10 mg Oral BID PRN Marino Hansen, MD   nortriptyline 10 mg Oral HS Marino Hansen MD   polyethylene glycol 17 g Oral Daily PRN Marino Hansen, MD   senna-docusate sodium 1 tablet Oral HS Dalia Mcburney, PA-C          DVT ppx: patient is ambulatory       Physical Exam:    T: 97 8  HR: 69  BP: 140/86  RR: 18  POx: 97%        General: alert, no apparent distress, cooperative and comfortable  HEENT:  Head: Normal, normocephalic, atraumatic  CARDIAC:  +S1/2  LUNGS:  respirations unlabored   ABDOMEN:  soft NT   EXTREMITIES:  volume status currently stable   NEURO:   mental status, speech normal, alert and oriented x3  PSYCH:  mood/affect currently stable          * 33 min was spent in preparation for patient discharge including discussion with patient, patient's nurse, therapy staff, and case management

## 2019-10-24 NOTE — DISCHARGE INSTRUCTIONS
Hemorrhagic Stroke   WHAT YOU NEED TO KNOW:   What is a hemorrhagic stroke? A hemorrhagic stroke happens when a blood vessel in your brain tears or bursts  Blood then leaks out of the vessel and causes pressure to build up in your brain  The increased pressure damages brain tissue  Blood may collect within your brain or between layers of tissue that protect the brain  What are the warning signs of a stroke? The word F A S T   can help you remember and recognize warning signs of a stroke  · F = Face:  One side of the face droops  · A = Arms:  One arm starts to drop when both arms are raised  · S = Speech:  Speech is slurred or sounds different than usual     · T = Time:  A person who is having a stroke needs to be seen immediately  A stroke is a medical emergency that needs immediate treatment  Some medicines and treatments work best if given within a few hours of a stroke  ·        What are the signs and symptoms of a hemorrhagic stroke? Signs and symptoms depend on which part of your brain is injured  One or more of the following may appear minutes or hours after a stroke, and worsen quickly:  · Severe headache, neck pain, or a stiff neck    · Nausea and vomiting     · Blurred or double vision, or vision loss     · Trouble waking up or being less awake than usual    · Numbness, tingling, weakness, or paralysis on one side of your body     · Trouble walking or speaking    · Seizures  What increases my risk for a hemorrhagic stroke? · High blood pressure, high cholesterol, or diabetes    · An arteriovenous malformation or an aneurysm    · Blood thinning medicine or a blood clotting disorder    · Smoking cigarettes, drinking large amounts of alcohol, or using illegal drugs such as cocaine    · A family history of stroke  How is a hemorrhagic stroke diagnosed? Your healthcare provider will examine you  You will need a CT or MRI to find the bleeding and check for damage to your brain   You may be given contrast liquid to help your skull and brain show up better in the pictures  Tell the healthcare provider if you have ever had an allergic reaction to contrast liquid  Do not enter the MRI room with anything metal  Metal can cause serious injury  Tell the healthcare provider if you have any metal in or on your body  How is a hemorrhagic stroke treated? Treatment depends on how severe your bleeding is and how much damage has been done to your brain  The bleeding will be controlled and stopped to prevent more damage to your brain  You may need any of the following:  · Medicines  may be given to help stop the bleeding and lower your blood pressure  You may also need medicine to decrease pain, reduce brain pressure, or prevent seizures  · Surgery  may be needed to stop the bleeding or remove blood that has leaked out of the blood vessels  A tube may be placed in your skull to drain fluid  This will help decrease pressure in your brain and prevent more damage  The tube may also monitor pressure in your brain  What can I do to lower my risk for a hemorrhagic stroke? · Manage health conditions  Take your medicine as directed  Check your blood pressure and blood sugar levels as directed  Keep a record and bring it to your follow-up visits  Control your blood sugar level if you have hyperglycemia or diabetes  · Do not smoke cigarettes or use illegal drugs  Nicotine and other chemicals in cigarettes and cigars can cause blood vessel damage  Nicotine and illegal drugs both increase your risk for a stroke  Ask your healthcare provider for information if you currently smoke and need help to quit  E-cigarettes or smokeless tobacco still contain nicotine  Talk to your healthcare provider before you use these products  · Do not drink alcohol  Heavy alcohol use increases your risk for any type of stroke  · Eat a variety of healthy foods    Healthy foods include whole-grain breads, low-fat dairy products, beans, lean meats, and fish  Eat at least 5 servings of fruits and vegetables each day  Choose foods that are low in fat, cholesterol, salt, and sugar  Eat foods that are high in potassium, such as potatoes and bananas  · Exercise as directed  Activity is important for preventing another stroke  You may need to work with an exercise therapist to learn how to exercise safely  Exercise may help you be able to do your normal activities more easily  Exercise also helps control your blood pressure and weight  · Manage stress  Stress can increase your blood pressure  Find new ways to relax, such as deep breathing or listening to music  What is rehabilitation (rehab)? Rehab is an important part of treatment  Physical therapists can help you gain strength and build endurance  Occupational therapists teach you new ways to do daily activities, such as getting dressed  Therapy can help you improve your ability to walk or keep your balance  You may start slowly and start doing more difficult tasks over time  Your therapy may include tasks or movements you will need to do for everyday activities  An example is being able to raise or lower yourself from a chair  A speech therapist helps you relearn or improve your ability to talk and swallow  What do I need to know about depression? Depression can happen after a stroke  Talk to your healthcare provider if you have depression that continues or is getting worse  Your provider may be able to help treat your depression  Your provider can also recommend support groups for you to join  A support group is a place to talk with others who have had a stroke  It may also help to talk to friends and family members about how you are feeling  Tell your family and friends that if they see these signs, to let your healthcare provider know   You may show any of the following signs of depression:  · Extreme sadness    · Avoiding social interaction with family or friends    · A lack of interest in things you once enjoyed    · Irritability    · Trouble sleeping    · Low energy levels    · A change in eating habits or sudden weight gain or loss  Where can I find support and more information? · National Stroke Association  [address] [place] [address]  Phone: 8- 690 - 928-4329  Web Address: [employer]  org  Call 911 for any of the following:   · You have any of the following signs of a stroke:      ¨ Numbness or drooping on one side of your face     ¨ Weakness in an arm or leg    ¨ Confusion or difficulty speaking    ¨ Dizziness, a severe headache, or vision loss    ·            · You have a seizure  When should I seek immediate care? · Your arm or leg feels warm, tender, and painful  It may look swollen and red  · You have trouble swallowing  · Your blood pressure or blood sugar level is higher or lower than you were told it should be  · You fall and have heavy or unusual bleeding  When should I contact my healthcare provider? · You have questions or concerns about your condition or care  CARE AGREEMENT:   You have the right to help plan your care  Learn about your health condition and how it may be treated  Discuss treatment options with your caregivers to decide what care you want to receive  You always have the right to refuse treatment  The above information is an  only  It is not intended as medical advice for individual conditions or treatments  Talk to your doctor, nurse or pharmacist before following any medical regimen to see if it is safe and effective for you  © 2017 [address] Information is for End User's use only and may not be sold, redistributed or otherwise used for commercial purposes  All illustrations and images included in CareNotes® are the copyrighted property of [employer]  or Akshat Perry    Please note you are restricted from driving/operating a motorized vehicle/operating heavy machinery/etc until you are cleared by your doctors or through a formal driving evaluation  This service is offered through [facility] driving evaluation program on [address] [place] [address] however this evaluation can be done at a site of your choosing  Please contact your family doctor for a referral when your family doctor clears you to perform this evaluation once your neurologic/physical deficits have stabilized  Please see your doctors listed in the follow up providers section of your discharge paperwork, and take the discharge paperwork with you to your appointments    Hemorrhagic Stroke   WHAT YOU NEED TO KNOW:   A hemorrhagic stroke happens when a blood vessel in your brain bursts  This may happen if the blood vessel wall is weak, or if a blood clot gets stuck in blood vessel  Blood then flows out of the vessel and damages brain tissue  Blood may collect within your brain or between layers of tissue that protect the brain  DISCHARGE INSTRUCTIONS:   Call 911 for any of the following:   · You have any of the following signs of a stroke:      ¨ Numbness or drooping on one side of your face     ¨ Weakness in an arm or leg    ¨ Confusion or difficulty speaking    ¨ Dizziness, a severe headache, or vision loss    ·            · You have a seizure  Seek care immediately if:   · Your arm or leg feels warm, tender, and painful  It may look swollen and red  · You have trouble swallowing  · Your blood pressure or blood sugar level is higher or lower than you were told it should be  · You fall and have unusual and heavy bleeding  Contact your healthcare provider or neurologist if:   · You have questions or concerns about your condition or care  Medicines:   · Medicines  may be given to treat high cholesterol, high blood pressure, or diabetes  You may also need medicine to prevent seizures  · Take your medicine as directed    Contact your healthcare provider if you think your medicine is not helping or if you have side effects  Tell him or her if you are allergic to any medicine  Keep a list of the medicines, vitamins, and herbs you take  Include the amounts, and when and why you take them  Bring the list or the pill bottles to follow-up visits  Carry your medicine list with you in case of an emergency  Warning signs of a stroke: The word F A S T   can help you remember and recognize warning signs of a stroke  · F = Face:  One side of the face droops  · A = Arms:  One arm starts to drop when both arms are raised  · S = Speech:  Speech is slurred or sounds different than usual     · T = Time:  A person who is having a stroke needs to be seen immediately  A stroke is a medical emergency that needs immediate treatment  Some medicines and treatments work best if given within a few hours of a stroke  ·        Follow up with your healthcare provider or neurologist as directed: You may need regular tests of your brain function  Write down your questions so you remember to ask them during your visits  Go to rehabilitation (rehab) as directed:  Rehab is an important part of treatment  Physical therapists can help you gain strength and build endurance  Occupational therapists teach you new ways to do daily activities, such as getting dressed  Therapy can help you improve your ability to walk or keep your balance  You may start slowly and start doing more difficult tasks over time  Your therapy may include tasks or movements you will need to do for everyday activities  An example is being able to raise or lower yourself from a chair  A speech therapist helps you relearn or improve your ability to talk and swallow  Prevent another hemorrhagic stroke:   · Manage health conditions  Take your medicine as directed  Check your blood pressure and blood sugar levels as directed  Keep a record and bring it to your follow-up visits   Control your blood sugar level if you have hyperglycemia or diabetes  · Do not smoke cigarettes or use illegal drugs  Nicotine and other chemicals in cigarettes and cigars can cause blood vessel damage  Nicotine and illegal drugs both increase your risk for a stroke  Ask your healthcare provider for information if you currently smoke and need help to quit  E-cigarettes or smokeless tobacco still contain nicotine  Talk to your healthcare provider before you use these products  · Do not drink alcohol  Heavy alcohol use increases your risk for any type of stroke  · Eat a variety of healthy foods  Healthy foods include whole-grain breads, low-fat dairy products, beans, lean meats, and fish  Eat at least 5 servings of fruits and vegetables each day  Choose foods that are low in fat, cholesterol, salt, and sugar  Eat foods that are high in potassium, such as potatoes and bananas  · Exercise as directed  Activity is important for preventing another stroke  You may need to work with an exercise therapist to learn how to exercise safely  Exercise may help you be able to do your normal activities more easily  Exercise also helps control your blood pressure and weight  · Manage stress  Stress can increase your blood pressure  Find new ways to relax, such as deep breathing or listening to music  What you need to know about depression:  Depression can happen after a stroke  Talk to your healthcare provider if you have depression that continues or is getting worse  Your provider may be able to help treat your depression  Your provider can also recommend support groups for you to join  A support group is a place to talk with others who have had a stroke  It may also help to talk to friends and family members about how you are feeling  Tell your family and friends that if they see these signs, to let your healthcare provider know   You may show any of the following signs of depression:  · Extreme sadness    · Avoiding social interaction with family or friends    · A lack of interest in things you once enjoyed    · Irritability    · Trouble sleeping    · Low energy levels    · A change in eating habits or sudden weight gain or loss  For support and more information:   · National Stroke Association  [address] , [place] [address]  Phone: 7- 817 - 901-7056  Web Address: [employer]  org  © 2017 [address]  Information is for End User's use only and may not be sold, redistributed or otherwise used for commercial purposes  All illustrations and images included in CareNotes® are the copyrighted property of [employer] A [employer]  or Akshat Perry  The above information is an  only  It is not intended as medical advice for individual conditions or treatments  Talk to your doctor, nurse or pharmacist before following any medical regimen to see if it is safe and effective for you  Please note changes may have been made to your medications please refer to your discharge paperwork for your current medications and take this list with you to all your doctors appointments for your doctors to review    Please do not resume a home medication unless the medication reconciliation sheet indicates to do so, please do not assume that a medication that you were given a prescription for is the same as a medication you have at home based on both medications having the same name as dosages and frequency may have changed  Your nurse also reviewed with you just prior to your discharge from the hospital your medications, when to take them, how to take them, what they are for, how much to take, and when your next dose is due, please follow these instructions         Please check your blood pressure prior to taking your blood pressure medications and 1 hour after, please contact your family doctor or cardiologist immediately for a blood pressure below 100/60 and do not take your blood pressure medications until speaking with them, please contact your family doctor or cardiologist immediately for blood pressure greater than 160/100    Please note the following incidental findings were found during your recent hospitalization please discuss them with your doctors so that they may arrange any tests/referrals as they deem necessary:     1) 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: Please follow up with your primary care provider for further testing/treatment if any and/or specialist referral if any as they deem necessary     2) low platelet cell count: Please follow up with your primary care provider for further testing/treatment if any and/or specialist referral if any as they deem necessary       Please avoid NSAID (including but not limited to nabumetone, celebrex, celecoxib, advil, aleve, motrin, naproxen, ibuprofen, mobic, meloxicam, diclofenac etc) medications, anti platelet medications (including but not limited to plavix and plavix containing products), and any prescription blood thinners due to your already being on aspirin and when combined with these other medications they can increase your risk of bleeding; you may be cleared to use these medications in the future but do not do so unless advised to do so by your doctors    Please have your CTA scan of your head and neck done 2-3 days prior to your follow up visit with Dr Abelino Salinas on 1/24/20 and bring the disk with the CTA scan images on them with you to the appointment; you have been provided an electronic prescription that can be used at [address] [place] [address] radiology facilities however if you do not wish to use [address] [place] [address] radiology services please contact [facility] associates for a paper prescription to use at a radiology facility of your choosing and bring the disk with the CTA scan images on them to your appointment      Please have your MRI scan of your head done 2-3 days prior to your follow up visit with Dr Feli Armas on 1/3/20 and bring the disk with the MRI scan images on them with you to the appointment; you have been provided an electronic prescription that can be used at [facility] radiology facilities however if you do not wish to use [facility] radiology services please contact   [facility] associates for a paper prescription to use at a radiology facility of your choosing and bring the disk with the MRI scan images on them to your appointment        Please note a summary of your hospital stay with relevant information for your doctors has been sent to them, please confirm with your doctors at your follow up visits that they have received this summary and have them contact [address] [place] [address] if they have not received them along with any other medical records they may require

## 2019-10-24 NOTE — PROGRESS NOTES
10/23/19 1840   Pain Assessment   Pain Assessment 0-10   Pain Score 6   Pain Type Acute pain   Pain Location Head   Pain Descriptors Headache   Restrictions/Precautions   Precautions Limb alert   Comprehension   QI: Comprehension 4  Undestands: Clear comprehension without cues or repetitions   Comprehension (FIM) 6 - Understands complex/abstract but requires more time   Expression   QI: Expression 4  Express complex messages without difficulty and with speech that is clear and easy to [facility]   Expression (FIM) 6 - Expresses complex/abstract but requires:  more time   Social Interaction   Cooperation with staff   Participation Individual   Social Interaction (FIM) 6 - Interacts appropriately with others BUT requires extra  time   Problem Solving   Routine Manages call bell   Problem solving (FIM) 6 - Solves complex problems BUT requires extra time   Memory   Recognize People Yes   Remember Routine Yes   Initiates Tasks Yes   Recalls Precaution Yes   Memory (FIM) 6 - Recognizes with extra time   Executive Function Skills   Insight [facility]   Planning [facility]   Memory Skills   Orientation Level Oriented X4   Auditory Comprehension   Comphrehends Conversation Complex   Interfering Components Attention - sustained   EffectiveTechniques Extra processing time   Speech/Swallow Mechanism Exam   Labial Symmetry Abnormal symmetry right   Labial ROM Reduced left   Facial Symmetry Left droop   Facial ROM Mild;Reduced left   Facial Sensation Reduced   Lingual Strength Moderate reduced   Respratory Status Nasal Cannula   Motor Speech Evaluation   Dysarthria Yes   Mild Dystharia Impaired articulation   Speech/Language/Cognition Assessmetn   Treatment Assessment Speech Pathology Daily Treatment Note - Speech/Cognitive Communication Skills -Oral exercises were completed 1:1 with SLP as a direct model and using a large mirror for visual feedback to promote full ROM of oral articulators    Exercises included musculature of the jaw, cheeks, lips and tongue  Post exercises SLP targeted over articulation in words and then in reading phrases with strong focus on sibilants with tongue precision  Swallow Information   Current Risks for Dysphagia & Aspiration Dysarthria;General debilitation;New Neuro event   Current Symptoms/Concerns Cough; With liquids   Current Diet Regular; Thin liquid   Consistencies Assessed and Performance   Oral Stage Mild impaired   Phargngeal Stage Mild impaired   Recommendations   Diet Solid Recommendation Regular consistency   Diet Liquid Recommendation Thin liquid   General Precautions Minimize distractions   Compensatory Swallowing Strategies Alternate solids and liquids   Eating   Type of Assistance Needed Independent   Eating CARE Score 6   Swallow Assessment   Swallow Treatment Assessment Speech Pathology Daily Treatment Note - Swallow Oral Function - SLP targeted pharyngeal phase swallow function via instruction on wet verses dry vocal quality to determine adequate airway protection  Ice cold liquid was utilized in this task as the stimulus  Small sips were provided  Post swallow, patient was asked to produce a sustained /a/   Judgements were made, initially by skilled therapist and then patient was encouraged to  themselves, if the vocal quality was wet or dry  Compensatory techniques of throat clear and cough were introduced as methods to protect themselves from food or liquids entering the airway  A visual diagram was utilized depicting the Oral and Pharyngeal phases of swallow to increase comprehension of task  Patient is gaining in post lingual control and awareness to decrease pocketing and cough at mealtime  Swallow Assessment Prognosis   Prognosis Good   Prognosis Considerations Participation level   SLP Therapy Minutes   SLP Time In [address]   SLP Time Out 1940   SLP Total Time (minutes) 60   SLP Mode of treatment - Individual (minutes) 60   Therapy Time missed   Time missed?  No   Daily FIM Score   Eating (FIM) 6 - Patient requires increased time or safety concern

## 2019-10-24 NOTE — CASE MANAGEMENT
DCI reviewed; Pt being [address] home at 1 PM today  Pt will be transported home by car with family, which Tx team has deetrmined to be a safe mode of transport  Outpatient PT, OT, & ST to be provided by  in [place] per Pt preference; referral made & they will call Pt to schedule  Additional FU appts indicated on AVS, to be scheduled by Pt per his scheduling preferences  [employer] has approved all days & will be notified of DC

## 2019-10-24 NOTE — NURSING NOTE
Left sided weakness  Stroke education along with meds and dise effects discussed and understood  Discharge instructions reviewed  Team discussed safest transport home is via car driven by family member  Discharged home in satisfactory condiiton

## 2019-10-24 NOTE — PLAN OF CARE
Problem: Neurological Deficit  Goal: Neurological status is stable or improving  Description  Interventions:  - Monitor and assess patient's level of consciousness, motor function, sensory function, and level of assistance needed for ADLs  - Monitor and report changes from baseline  Collaborate with interdisciplinary team to initiate plan and implement interventions as ordered  - Provide and maintain a safe environment  - Consider seizure precautions  - Consider fall precautions  - Consider aspiration precautions  - Consider bleeding precautions  10/24/2019 1025 by Loren Campbell RN  Outcome: Adequate for Discharge  10/24/2019 1014 by Loren Campbell RN  Outcome: Progressing     Problem: Activity Intolerance/Impaired Mobility  Goal: Mobility/activity is maintained at optimum level for patient  Description  Interventions:  - Assess and monitor patient  barriers to mobility and need for assistive/adaptive devices  - Assess patient's emotional response to limitations  - Collaborate with interdisciplinary team and initiate plans and interventions as ordered  - Encourage independent activity per ability   - Maintain proper body alignment  - Perform active/passive rom as tolerated/ordered  - Plan activities to conserve energy   - Turn patient as appropriate  10/24/2019 1025 by Loren Campbell RN  Outcome: Adequate for Discharge  10/24/2019 1014 by Loren Campbell RN  Outcome: Progressing     Problem: Communication Impairment  Goal: Ability to express needs and understand communication  Description  Assess patient's communication skills and ability to understand information  Patient will demonstrate use of effective communication techniques, alternative methods of communication and understanding even if not able to speak  - Encourage communication and provide alternate methods of communication as needed  - Collaborate with case management/ for discharge needs    - Include patient/family/caregiver in decisions related to communication  10/24/2019 1025 by Mandeep Cid RN  Outcome: Adequate for Discharge  10/24/2019 1014 by Mandeep Cid RN  Outcome: Progressing     Problem: Potential for Aspiration  Goal: Non-ventilated patient's risk of aspiration is minimized  Description  Assess and monitor vital signs, respiratory status, and labs (WBC)  Monitor for signs of aspiration (tachypnea, cough, rales, wheezing, cyanosis, fever)  - Assess and monitor patient's ability to swallow  - Place patient up in chair to eat if possible  - HOB up at 90 degrees to eat if unable to get patient up into chair   - Supervise patient during oral intake  - Instruct patient/ family to take small bites  - Instruct patient/ family to take small single sips when taking liquids  - Follow patient-specific strategies generated by speech pathologist   10/24/2019 1025 by Mandeep Cid RN  Outcome: Adequate for Discharge  10/24/2019 1014 by Mandeep Cid RN  Outcome: Progressing  Goal: Ventilated patient's risk of aspiration is minimized  Description  Assess and monitor vital signs, respiratory status, airway cuff pressure, and labs (WBC)  Monitor for signs of aspiration (tachypnea, cough, rales, wheezing, cyanosis, fever)  - Elevate head of bed 30 degrees if patient has tube feeding   - Monitor tube feeding  10/24/2019 1025 by Mandeep Cid RN  Outcome: Adequate for Discharge  10/24/2019 1014 by Mandeep Cid RN  Outcome: Progressing     Problem: Nutrition  Goal: Nutrition/Hydration status is improving  Description  Monitor and assess patient's nutrition/hydration status for malnutrition (ex- brittle hair, bruises, dry skin, pale skin and conjunctiva, muscle wasting, smooth red tongue, and disorientation)  Collaborate with interdisciplinary team and initiate plan and interventions as ordered  Monitor patient's weight and dietary intake as ordered or per policy   Utilize nutrition screening tool and intervene per policy  Determine patient's food preferences and provide high-protein, high-caloric foods as appropriate  - Assist patient with eating   - Allow adequate time for meals   - Encourage patient to take dietary supplement as ordered  - Collaborate with clinical nutritionist   - Include patient/family/caregiver in decisions related to nutrition  10/24/2019 1025 by Franky Dozier RN  Outcome: Adequate for Discharge  10/24/2019 1014 by Franky Dozier RN  Outcome: Progressing     Problem: Potential for Falls  Goal: Patient will remain free of falls  Description  INTERVENTIONS:  - Assess patient frequently for physical needs  -  Identify cognitive and physical deficits and behaviors that affect risk of falls    -  [place] fall precautions as indicated by assessment   - Educate patient/family on patient safety including physical limitations  - Instruct patient to call for assistance with activity based on assessment  - Modify environment to reduce risk of injury  - Consider OT/PT consult to assist with strengthening/mobility  10/24/2019 1025 by Franky Dozier RN  Outcome: Adequate for Discharge  10/24/2019 1014 by Franky Dozier RN  Outcome: Progressing     Problem: Prexisting or High Potential for Compromised Skin Integrity  Goal: Skin integrity is maintained or improved  Description  INTERVENTIONS:  - Identify patients at risk for skin breakdown  - Assess and monitor skin integrity  - Assess and monitor nutrition and hydration status  - Monitor labs   - Assess for incontinence   - Turn and reposition patient  - Assist with mobility/ambulation  - Relieve pressure over bony prominences  - Avoid friction and shearing  - Provide appropriate hygiene as needed including keeping skin clean and dry  - Evaluate need for skin moisturizer/barrier cream  - Collaborate with interdisciplinary team   - Patient/family teaching  - Consider wound care consult   10/24/2019 1025 by Franky Dozier RN  Outcome: Adequate for Discharge  10/24/2019 1014 by Nazario Trent RN  Outcome: Progressing

## 2019-10-24 NOTE — PLAN OF CARE
Problem: Neurological Deficit  Goal: Neurological status is stable or improving  Description  Interventions:  - Monitor and assess patient's level of consciousness, motor function, sensory function, and level of assistance needed for ADLs  - Monitor and report changes from baseline  Collaborate with interdisciplinary team to initiate plan and implement interventions as ordered  - Provide and maintain a safe environment  - Consider seizure precautions  - Consider fall precautions  - Consider aspiration precautions  - Consider bleeding precautions  Outcome: Progressing     Problem: Activity Intolerance/Impaired Mobility  Goal: Mobility/activity is maintained at optimum level for patient  Description  Interventions:  - Assess and monitor patient  barriers to mobility and need for assistive/adaptive devices  - Assess patient's emotional response to limitations  - Collaborate with interdisciplinary team and initiate plans and interventions as ordered  - Encourage independent activity per ability   - Maintain proper body alignment  - Perform active/passive rom as tolerated/ordered  - Plan activities to conserve energy   - Turn patient as appropriate  Outcome: Progressing     Problem: Communication Impairment  Goal: Ability to express needs and understand communication  Description  Assess patient's communication skills and ability to understand information  Patient will demonstrate use of effective communication techniques, alternative methods of communication and understanding even if not able to speak  - Encourage communication and provide alternate methods of communication as needed  - Collaborate with case management/ for discharge needs  - Include patient/family/caregiver in decisions related to communication    Outcome: Progressing     Problem: Potential for Aspiration  Goal: Non-ventilated patient's risk of aspiration is minimized  Description  Assess and monitor vital signs, respiratory status, and labs (WBC)  Monitor for signs of aspiration (tachypnea, cough, rales, wheezing, cyanosis, fever)  - Assess and monitor patient's ability to swallow  - Place patient up in chair to eat if possible  - HOB up at 90 degrees to eat if unable to get patient up into chair   - Supervise patient during oral intake  - Instruct patient/ family to take small bites  - Instruct patient/ family to take small single sips when taking liquids  - Follow patient-specific strategies generated by speech pathologist   Outcome: Progressing  Goal: Ventilated patient's risk of aspiration is minimized  Description  Assess and monitor vital signs, respiratory status, airway cuff pressure, and labs (WBC)  Monitor for signs of aspiration (tachypnea, cough, rales, wheezing, cyanosis, fever)  - Elevate head of bed 30 degrees if patient has tube feeding   - Monitor tube feeding  Outcome: Progressing     Problem: Nutrition  Goal: Nutrition/Hydration status is improving  Description  Monitor and assess patient's nutrition/hydration status for malnutrition (ex- brittle hair, bruises, dry skin, pale skin and conjunctiva, muscle wasting, smooth red tongue, and disorientation)  Collaborate with interdisciplinary team and initiate plan and interventions as ordered  Monitor patient's weight and dietary intake as ordered or per policy  Utilize nutrition screening tool and intervene per policy  Determine patient's food preferences and provide high-protein, high-caloric foods as appropriate  - Assist patient with eating   - Allow adequate time for meals   - Encourage patient to take dietary supplement as ordered  - Collaborate with clinical nutritionist   - Include patient/family/caregiver in decisions related to nutrition    Outcome: Progressing     Problem: Potential for Falls  Goal: Patient will remain free of falls  Description  INTERVENTIONS:  - Assess patient frequently for physical needs  -  Identify cognitive and physical deficits and behaviors that affect risk of falls    -  [place] fall precautions as indicated by assessment   - Educate patient/family on patient safety including physical limitations  - Instruct patient to call for assistance with activity based on assessment  - Modify environment to reduce risk of injury  - Consider OT/PT consult to assist with strengthening/mobility  Outcome: Progressing     Problem: Prexisting or High Potential for Compromised Skin Integrity  Goal: Skin integrity is maintained or improved  Description  INTERVENTIONS:  - Identify patients at risk for skin breakdown  - Assess and monitor skin integrity  - Assess and monitor nutrition and hydration status  - Monitor labs   - Assess for incontinence   - Turn and reposition patient  - Assist with mobility/ambulation  - Relieve pressure over bony prominences  - Avoid friction and shearing  - Provide appropriate hygiene as needed including keeping skin clean and dry  - Evaluate need for skin moisturizer/barrier cream  - Collaborate with interdisciplinary team   - Patient/family teaching  - Consider wound care consult   Outcome: Progressing

## 2019-10-24 NOTE — PROGRESS NOTES
10/24/19 1003   Charting Type   Charting Type Shift assessment   Neurological   Neuro (WDL) X   Level of Consciousness Alert/awake   Orientation Level Oriented X4   Cognition Appropriate judgement; Follows commands   Extraocular Movements Full   Left Upper Visual Fields Diminished   Left Lower Visual Fields Diminished   Facial Symmetry Left facial drooping   Swallow Able to swallow solids and liquids without difficulty   R Pupil Size (mm) 3   L Pupil Size (mm) 3   L Hand  Moderate   L Foot Dorsiflexion Moderate   LUE Muscle Strength 4- Movement against gravity and limited resistance   LLE Muscle Strength 4- Movement against gravity and limited resistance   Neuro Symptoms None   Relieved by Rest   Neuro Additional Assessments No   [place] Coma Scale   Eye Opening 4   Best Verbal Response 5   Best Motor Response 6   [place] Coma Scale Score 15   HEENT   HEENT (WDL) X   Head and Face Asymmetrical   R Eye Mildly impaired vision   L Eye Blurred   Vision - Corrective Lenses (at bedside) Glasses   R Ear Intact   L Ear Intact   Teeth Intact   Respiratory   Respiratory (WDL) WDL   Respiratory Pattern Normal   Chest Assessment Chest expansion symmetrical   Bilateral Breath Sounds Clear   Respiratory Interventions   Respiratory Interventions Cough and deep breathe;Incentive spirometry   Cough and Deep Breathe   Cough and Deep Breathe Yes   Cardiac   Cardiac (WDL) WDL   Cardiac Regularity Regular   Cardiac Symptoms None   Pacemaker/ICD No   Pain Assessment   Pain Assessment No/denies pain   Pain Score No Pain   Peripheral Vascular   Peripheral Vascular (WDL) WDL   RUE Neurovascular Assessment   R Radial Pulse +2   LUE Neurovascular Assessment   L Radial Pulse +2   RLE Neurovascular Assessment   R Pedal Pulse +2   LLE Neurovascular Assessment   L Pedal Pulse +2   Integumentary   Integumentary (WDL) WDL   Skin Condition/Temp Warm;Dry   Tattoos/Piercings   Does patient have tattoos?  Yes   Piercings Remaining No   Sushil Scale   Sensory Perceptions 3   Moisture 4   Activity 4   Mobility 3   Nutrition 3   Friction and Shear 3   Sushil Scale Score 20   Musculoskeletal   Musculoskeletal (WDL) X   Level of Assistance Independent   Assistive Device None   LUE Limited movement   LLE Limited movement   Musculoskeletal Additional Assessments No   Gastrointestinal   Gastrointestinal (WDL) X   Last BM Date 10/22/19   GI Symptoms None   Gastrointestinal Additional Assessments No   Stool Assessment   Bowel Incontinence No   Genitourinary   Genitourinary (WDL) WDL   Bladder Shift Assessment   Bladder Device None   Bladder Incontinence No   Bladder Management Independent   Urine Assessment   Urinary Incontinence No   Urine Color Yellow/straw   Urine Appearance Clear   Urine Odor No odor   Genitalia   Male Genitalia Intact   Anal/Rectal   Anal/Rectal (WDL) WDL   Psychosocial   Psychosocial (WDL) WDL   Patient Behaviors/Mood Calm; Cooperative   Needs Expressed Denies   Ability to Express Feelings Able to express   Ability to Express Needs Able to express   Ability to Express Thoughts Able to express   Ability to Understand Others Understands

## 2019-10-25 NOTE — SPEECH THERAPY NOTE
Toan Kiser is a 45 y o  male who presented to the [facility] with facial droop and L arm weakness and was found to have rt BG hemorrhage in the setting of elevated BP  Patient was managed non-operatively with BP control  There was concern for SI and psych did evaluate the patient and he was cleared from psych perspective  Prior to admission, patient was essentially independent with mobility, transfers and ADL's  Seema Monique lives with his family (nephew and girlfriend) in a single family home  First floor set up is available  The patient will not have 24 hour supervision available upon discharge  Skilled speech assessment was completed  Patient presents with mild oral pharyngeal dysphagia secondary to left facial droop and numbness and mild incoordination and weakness  There is intermittent cough with thin liquids associated with mild pocketing left and larger sip size  He is able to protect the airway with strong cough  Speech is mildly slurred with associated anxiety  Speech comprehension and expression are supervision level at the multi step level due to distractibility and reduced sequencing  He benefits from repetition over time  Cognitively, patient presents as mod I for memory and reasoning his current situation  He is a good participant and uses visual aids well to maintain and understand his new therapy program   Skilled recommended on the short term to address the above  Patient has participated in skilled ST focusing on swallow oral function and speech cognitive communication skills  Recommended diet texture at the time of discharge is regular with thin liquids  Patient has gained in strengthening and use of compensatory safe swallow techniques for safety and increased function to manage regular solids and thin liquids    Speech comprehension and expression at the multi step level for cues, instructions and verbal sequencing of procedures in Mod I   Cognitively, patient presents as Mod I for memory and reasoning his level of assistance to comprehend when to ask for help to ensure his ongoing safety in the home setting

## 2019-10-28 ENCOUNTER — TELEPHONE (OUTPATIENT)
Dept: NEUROLOGY | Facility: CLINIC | Age: 44
End: 2019-10-28

## 2019-10-28 NOTE — TELEPHONE ENCOUNTER
The purpose of this phone call is to assess patient's general wellbeing or for any assistance needed with follow-up care  Called patient since discharge, he has not experienced any new or worsening stroke symptoms  Patient states he continues to drop objects from his left hand at times  No additional symptoms noted  States he is happy to announce not hexpierencing any headaches since discharge  Patient ambulates independently, preforms all his own ADLs, also manages his own medications, appointments, and affairs  Patient states he is trying to make an apportionment with a new PCP  States prior PCP retired, to hear back tomorrow  Still scheduled for 1/3 stroke hospital follow up with Dr Jose Henderson  I reviewed medications with him  There have not been any changes since discharge  he had no difficulties obtaining medications  Reports taking all as prescribed with no missed doses or medication side effects  Patient denies signs of bleeding  States he uses an willam called "[employer]" to assist with medication administration  I reviewed stroke symptoms and risk factor management with him  he verbalizes understanding of information  Patient does not monitor BP at home  he is a non smoker  No specific diet  Addressed all questions  No further questions or concerns at this time

## 2019-10-29 ENCOUNTER — EVALUATION (OUTPATIENT)
Dept: PHYSICAL THERAPY | Facility: CLINIC | Age: 44
End: 2019-10-29
Payer: COMMERCIAL

## 2019-10-29 DIAGNOSIS — I61.9 INTRAPARENCHYMAL HEMORRHAGE OF BRAIN (HCC): ICD-10-CM

## 2019-10-29 PROCEDURE — 97110 THERAPEUTIC EXERCISES: CPT | Performed by: PHYSICAL THERAPIST

## 2019-10-29 PROCEDURE — 97163 PT EVAL HIGH COMPLEX 45 MIN: CPT | Performed by: PHYSICAL THERAPIST

## 2019-10-29 NOTE — PROGRESS NOTES
PT Evaluation     Today's date: 10/29/2019  Patient name: Maranda Tomas  : 1975  MRN: [id number]  Referring provider: Safia Dia MD  Dx:   Encounter Diagnosis     ICD-10-CM    1  Intraparenchymal hemorrhage of brain (HCC) I61 9 Ambulatory referral to Physical Therapy                  Assessment  Assessment details: Patient is a 45y o  year old male presenting to OPPT s/p diagnosis of CVA  Patient demonstrates decreased residual left sided deficits with strength left UE/LE, endurance, balance, and functional independence  Mild decreased left sided attention with increased fatigue  Patient is unable to return to work at this time and requires assistance for ADLs and IADLs  He will benefit from skilled PT interventions to maximize return to PLOF and independence as able  Thank you for this referral and the ability to participate in the care of this patient  Impairments: abnormal coordination, abnormal gait, abnormal muscle firing, abnormal muscle tone, activity intolerance, impaired physical strength, lacks appropriate home exercise program and safety issue  Barriers to therapy: Does not drive  Understanding of Dx/Px/POC: good   Prognosis: good  Prognosis details: H/o chronic HAs    Goals  In 4 weeks, patient will:  1  Demonstrate ability to perform 30 minutes of activity without requiring seated rest break  2   Demonstrate ability to maintain upright balance unsupported by UEs while reaching above shoulder height without resistance to promote stability with ADLs  3  Demonstrate ability to lift up to 25 lbs from  knees to waist unsupported by UEs to promote stability with ADLs  4   Demonstrate increased strength of bilateral LEs to allow for improved transfer quality and stability    In 4-10 weeks, patient will:  1  Demonstrate reduced fall risk based on outcome measures  2  Demonstrate consistent carryover with HEP  3    Return to community ambulation with least restrictive AD for at least 1000 feet      Plan  Patient would benefit from: OT eval and speech eval  Other planned modality interventions: Modalities prn for symptom mangement  Planned therapy interventions: manual therapy, neuromuscular re-education, therapeutic exercise and home exercise program  Frequency: 2x week  Duration in visits: 8  Duration in weeks: 4  Plan of Care beginning date: 10/29/2019  Plan of Care expiration date: 11/28/2019  Treatment plan discussed with: patient        Subjective Evaluation    History of Present Illness  Date of onset: 10/14/2019  Mechanism of injury: Intense HAs started 7 years ago, followed for awhile, tried medications  Tried to manage HAs medically, nothing really helped so he didn't take the meds  Patient voices that two days before he had his stroke, he felt dizzy  This had happened before due to dehydration  Then two days later he was lifting weights and didn't feel well  Heel noted numbness in left UE, difficulty with fine motor control  Patient reports she called his sister and she said he sounded normal   He still wasn't feeling right, drove himself to the ED  Patient had a CT at the hospital, (+) bleed  Taken via ambulance [facility]  He was seen by neurosurgery and was deemed non-surgical at that point  He was admitted to the ICU for several days  Then ultimately transferred to ARU  He was there for approximately 1 5 weeks total   He was discharged this past Thursday, presents today for evaluation  Patient reports he is "just tired"  No problem with ADLs and this point, he has not returned to driving at this point  Patient was not working before this event, he does play the drums  He enjoys working out, [employer]        Of note:  Left LE numbness while showering  Limited endurance  Loses balance  Difficulty with divided attention  Quality of life: good    Pain  No pain reported  Progression: no change    Social Support  Steps to enter house: yes (2)  Lives in: [place] house  Lives with: Older brother staying with him  Employment status: not working  Hand dominance: right      Diagnostic Tests  MRI studies: abnormal (See results in imaging)  Treatments  Previous treatment: physical therapy, speech therapy and occupational therapy  Current treatment: physical therapy  Patient Goals  Patient goals for therapy: increased strength, improved balance, increased motion, independence with ADLs/IADLs and return to sport/leisure activities          Objective     Concurrent Complaints  Headaches: None at time of evaluation  Strength/Myotome Testing     Left Hip   Planes of Motion   Flexion: 4-  Extension: 4  Abduction: 4-    Right Hip   Planes of Motion   Flexion: 4  Extension: 4  Abduction: 4    Left Knee   Flexion: 4  Extension: 4+    Right Knee   Flexion: 4+  Extension: 4+    Left Ankle/Foot   Dorsiflexion: 4+  Plantar flexion: 4+    Right Ankle/Foot   Dorsiflexion: 4+  Plantar flexion: 4+  Neuro Exam:     Headaches   Headaches: None at time of evaluation       Oculomotor exam   Oculomotor ROM: WNL  Resting nystagmus: not present   Gaze holding nystagmus: not present left  and not present right  Smooth pursuits: within normal limits  Vertical saccades: normal  Horizontal saccades: normal  Convergence: normal  Cover test: normal    Sensation   Light touch LE: left impaired  Light touch LE: right WNL  Proprioception LE: left WNL and right WNL    Coordination   Heel to shin: left WNL and right WNL  Rapid alternating movements: UE impaired and LE impaired    Transfers Sit to stand: independent Sit to supine: independent Supine to sit: independent   Roll: independent    Functional outcomes   Functional outcome comment: Mini Best     Sit to Stand  2= Normal:  Comes to stand without use of hands and stabilizes independently     Rise to Toes  2= Normal:  Stable for 3 seconds with maximum height     Stand on one leg  1=Moderate: < 20 seconds  Right:  17 seconds  Left:  20 seconds Compensatory Stepping Correction - Forward  2= Normal:  Recovers independently with a single, large step (2nd realignment step is allowed)  Compensatory Stepping Correction - Backward  2= Normal:  Recovers independently with a single, large step (2nd realignment step is allowed)  Compensatory Stepping Correction - Lateral  2= Normal:  Recovers independently with a single, large step (cross over or lateral OK)  Stance (Feet Together); Eyes open, Firm surface  2= Normal:  30 seconds     Stance (Feet Together);  Eyes closed, Foam surface  1=Moderate: < 30 seconds    Incline - Eyes Closed  1=Moderate:  Stands independently <30 sec OR aligns with surface    Change in gait speed  1= Moderate:  Unable to change walking speed or signs of imbalance     Walk with Head Turns - Horizontal  1= Moderate:  Performs head turns with reduction in gait speed     Walk with Pivot Turns  2=  Normal:  Turns with feet close FAST (< or = to 3 steps) with good balance     Step over obstacles  2= Normal:  Able to step over box with minimal change of gait speed and with good balance     Timed up and go with dual task (3 meter walk)  TU Seconds  Dual Task TU 5 Seconds  1= Moderate: Dual Task affects counting OR walking (>10%) when compared to the TUG without Dual Task    Total Score: 22/28    Functional outcome gait comment: Mildly asymmetry in stance time left vs right   Mildly decreased heel strike and foot clearance with increased fatigue  Increased knee flexion on left with increased fatigue, slowed knee extension                 Precautions: Falls  Re-eval Date: 19    Date 10/29/19       Visit Count 1       FOTO See IE       Pain In See IE       Pain Out See IE           Manual          upcoming                   Exercise Diary  140s/90s with activity         Aerobic Elliptical L1  10 mins       SLR x 3 Stand   Foam         HR/TR Stand  Foam         Mini Squats Stand  Foam                 Natus                ADL Suite        Outdoors/obstacle course            Modalities          prn

## 2019-10-30 NOTE — PROGRESS NOTES
OT Evaluation     Today's date: 10/31/2019  Patient name: Emy Sarabia  : 1975  MRN: [id number]  Referring provider: Estella Doe MD  Dx:   Encounter Diagnosis     ICD-10-CM    1  Nontraumatic intracerebral hemorrhage, unspecified cerebral location, unspecified laterality ([facility] [address] ) I61 9    2  Intraparenchymal hemorrhage of brain [facility]) I61 9 Ambulatory referral to Occupational Therapy                  Assessment  Assessment details: Emy Sarabia is a 45 y o  male with a diagnosis of hemorrhagic CVA  A moderate complexity evaluation was completed today  The patient is having difficulty with picking up small objects, reacting quickly with left hand, sensing hot/cold temperature, opening jar lids, and tolerating activity for more than 1 hour without fatigue   Findings show an impairment with strength, activity tolerance, and pain which limits completion of ADL's/IADL's, and recreational activities  Patient will benefit from OT services to reach goals  Impairments: abnormal coordination, activity intolerance, impaired balance, impaired physical strength, lacks appropriate home exercise program and safety issue    Symptom irritability: moderateUnderstanding of Dx/Px/POC: good   Prognosis: good    Goals  STG's In 2 weeks:  1  Patient will increase strength of left  to 55# for lifting and carrying items  2  Patient will demonstrate good carryover and independence with HEP  3  Patient will improve sensitivity of numbness in left dorsum and palm of left hand by detecting the 4 56 monofilament with 80% accuracy  LTG's In 4-6 weeks:  1  Patient will improve fine motor coordination by completing the 9 hole peg test with left hand <35 seconds  2  Patient will increase strength of left  to 80#, pinch to 17#, left shld ABD/ER to 5/5 for lifting and carrying items  3   Patient will improve sensitivity of numbness in left dorsum and palm of left hand by detecting the 4 56 monofilament with 100% accuracy    4  Patient will tolerate at least 3 hours of activity with occasional rest breaks and no reported or signs of fatigue  Plan  Patient would benefit from: OT eval and skilled occupational therapy  Planned modality interventions: fluidotherapy  Planned therapy interventions: strengthening, therapeutic activities, therapeutic exercise, home exercise program, functional ROM exercises, patient education, fine motor coordination training, ADL retraining, manual therapy and self care  Duration in visits: 8  Duration in weeks: 4  Plan of Care beginning date: 10/31/2019  Plan of Care expiration date: 2019  Treatment plan discussed with: patient        Subjective Evaluation    History of Present Illness  Date of onset: 10/14/2019  Mechanism of injury: Intense HAs started 7 years ago, followed for awhile, tried medications  Tried to manage HAs medically, nothing really helped so he didn't take the meds  Patient voices that two days before he had his stroke, he felt dizzy  This had happened before due to dehydration  Then two days later he was lifting weights and didn't feel well  He noted numbness in left UE, difficulty with fine motor control  Patient reports she called his sister and she said he sounded normal   He still wasn't feeling right, drove himself to the ED  Patient had a CT at the hospital, (+) bleed  [facility]  He was seen by neurosurgery and was deemed non-surgical at that point  He was admitted to the ICU for several days  Then ultimately transferred to ARU  He was there for approximately 1 5 weeks total   He was discharged this past Thursday    Quality of life: excellent    Pain  Current pain ratin  At best pain ratin  At worst pain ratin    Social Support  Lives with: parents    Employment status: not working  Hand dominance: right    Treatments  Current treatment: occupational therapy  Patient Goals  Patient goals for therapy: increased strength, return to sport/leisure activities and independence with ADLs/IADLs          Objective     Observations     Additional Observation Details  Patient reported that his left hand felt as if it was burning when washing his hand with warm water this a m  Sensation on palm of hand 7/10 accuracy with 4 56 monofilament(impaired on distal palmar crease),  0/10 accuracy on dorsum of hand    Active Range of Motion   Left Shoulder   Normal active range of motion    Right Shoulder   Normal active range of motion    Left Elbow   Normal active range of motion    Right Elbow   Normal active range of motion    Left Wrist   Normal active range of motion    Right Wrist   Normal active range of motion    Left Thumb   Opposition: WFL's    Right Thumb   Opposition: WFL's    Passive Range of Motion     Additional Passive Range of Motion Details  9 hole peg test right hand: 30 sec  Left hand 57 sec      Strength/Myotome Testing     Left Shoulder     Planes of Motion   Flexion: 4   Extension: 4   Abduction: 4-   Adduction: 5   External rotation at 0°: 4   Internal rotation at 0°: 5     Isolated Muscles   Biceps: 5   Triceps: 4     Right Shoulder     Planes of Motion   Flexion: 5   Extension: 5   Abduction: 5   External rotation at 0°: 5   Internal rotation at 0°: 5     Isolated Muscles   Biceps: 5   Triceps: 5     Left Elbow   Flexion: 5  Extension: 5  Forearm supination: 5  Forearm pronation: 5    Right Elbow   Normal strength    Left Wrist/Hand   Wrist extension: 5  Wrist flexion: 5  Radial deviation: 5  Ulnar deviation: 5     (2nd hand position)     Trial 1: 45    Trial 2: 40    Thumb Strength  Key/Lateral Pinch     Trail 1: 13  Tip/Two-Point Pinch     Trial 1: 6    Right Wrist/Hand   Wrist extension: 5  Wrist flexion: 5  Radial deviation: 5  Ulnar deviation: 5     (2nd hand position)     Trial 1: 95    Trial 2: 92    Thumb Strength   Key/Lateral Pinch     Trial 1: 19  Tip/Two-Point Pinch     Trial 1: 13             Precautions: N/A              Manual  10/31/19                                                   Exercise Diary  10/31/19       T-putty  Without objects    With objects Red HEP   10x  Pinch 5x       Gripper  indiv digits        Tension pins        FMC  Grooved pegs  Rice w/ objects  Beans w/ tweezers  Nuts and bolts          Trampoline w/ medicine ball  Level 5 green ball 40x using both hands, right hand only   left hand only       Sensory sticks  Palm/dorsal left hand        UBC        Body blade        Catching ball        T-band   shld FF  ABD  ER        dynavision board                                                                                    Modalities

## 2019-10-31 ENCOUNTER — EVALUATION (OUTPATIENT)
Dept: SPEECH THERAPY | Facility: CLINIC | Age: 44
End: 2019-10-31
Payer: COMMERCIAL

## 2019-10-31 ENCOUNTER — EVALUATION (OUTPATIENT)
Dept: OCCUPATIONAL THERAPY | Facility: CLINIC | Age: 44
End: 2019-10-31
Payer: COMMERCIAL

## 2019-10-31 DIAGNOSIS — R47.1 DYSARTHRIA: ICD-10-CM

## 2019-10-31 DIAGNOSIS — I61.9 INTRAPARENCHYMAL HEMORRHAGE OF BRAIN (HCC): ICD-10-CM

## 2019-10-31 DIAGNOSIS — R13.11 DYSPHAGIA, ORAL PHASE: Primary | ICD-10-CM

## 2019-10-31 DIAGNOSIS — I61.9 NONTRAUMATIC INTRACEREBRAL HEMORRHAGE, UNSPECIFIED CEREBRAL LOCATION, UNSPECIFIED LATERALITY (HCC): Primary | ICD-10-CM

## 2019-10-31 PROCEDURE — 92522 EVALUATE SPEECH PRODUCTION: CPT | Performed by: NURSE PRACTITIONER

## 2019-10-31 PROCEDURE — 97166 OT EVAL MOD COMPLEX 45 MIN: CPT

## 2019-10-31 PROCEDURE — 92610 EVALUATE SWALLOWING FUNCTION: CPT | Performed by: NURSE PRACTITIONER

## 2019-10-31 PROCEDURE — 97110 THERAPEUTIC EXERCISES: CPT

## 2019-10-31 NOTE — PROGRESS NOTES
Speech Language Pathology Evaluation  Today's date: 10/31/2019  Patient name: Andreas Green    : 1975        Referring provider: Anette Celestin MD  Dx:   Encounter Diagnosis     ICD-10-CM    1  Intraparenchymal hemorrhage of brain (HCC) I61 9 Ambulatory referral to Speech Therapy       Start Time: 830  Stop Time: 930  Total time in clinic (min): 60 minutes    Subjective Comments: Patient arrived on time to evaluation today  Pt is s/p CVA on 10/14/19  Per physician chart review "Erik Meth a 45 y  o  male who presented to the [address] [place] [address] facial droop and L arm weakness and was found to have rt BG hemorrhage in the setting of elevated BP  Patient was managed non-operatively with BP control " Pt reports extreme fatigue since CVA, even after a full 8 hours of sleep  Pt's primary concern is articulation reporting it is "effortful to talk" and he feels his speech is "lazy " Pt also reports that since his CVA, he has to be mindful of his swallow while eating  Pt reports left sided buccal residue after swallow  Pt reports no difficulties with memory, attention, or word finding  Pt reports that both of his eyes have been watering more than usual for him since his CVA  Currently, pt receives PT services and has an OT evaluation after his Speech evaluation today  Pt has a follow-up with neurology on   Reason for Referral:Decreased speech intelligibility and Difficulty swallowing solids or liquids  Prior Functional Status:Communication appropriate and efficient in most situations  Minimal difficulty with self-monitoring, self-correction needed  Medical History significant for:   Past Medical History:   Diagnosis Date    Depression     Hypertension     Migraine     Stroke ([facility] [address] )      Clinically Complex Situations:Pt does have a history of depression       CT Head 10/22/19 "1   5 mm inferiorly oriented outpouching of the extradural right vertebral artery immediately proximal to the foramen magnum, 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  No intimal flap, however, is not identified currently  Differential diagnosis   of aneurysm not excluded although these are relatively rare in this location  Sequela of other vasculopathies/vasculitis not excluded although the remainder of the vessels appear normal   Potentially fibromuscular dysplasia could be considered  Surveillance CTA in 6-12 months suggested  2   Persistent, subacute right lentiform nucleus hematoma with surrounding edema, evolving slightly  No new hemorrhage  3   No hemodynamically significant stenosis major arteries of the neck  4   No definite etiology uncovered for intracranial hemorrhage by CTA  5   No major intracranial arterial occlusion "    Hearing:Other Pt reports tenitis but feels hearing is okay  Pt unsure the last time he had a hearing screening "it's been a long time "   Vision:Other Pt has prescription glasses from 2010 for night driving and reports he should go for a check up  Medication List:   Current Outpatient Medications   Medication Sig Dispense Refill    amLODIPine (NORVASC) 10 mg tablet Take 1 tablet (10 mg total) by mouth daily 30 tablet 0    aspirin (ECOTRIN LOW STRENGTH) 81 mg EC tablet Take 1 tablet (81 mg total) by mouth daily 30 tablet 0    lisinopril (ZESTRIL) 20 mg tablet Take 1 tablet (20 mg total) by mouth daily 30 tablet 0    metoclopramide (REGLAN) 10 mg tablet Take 1 tablet (10 mg total) by mouth 2 (two) times a day as needed (for headache) Not to be used more than 2 days per week  15 tablet 0    nortriptyline (PAMELOR) 10 mg capsule Take 1 capsule (10 mg total) by mouth daily at bedtime 30 capsule 0     No current facility-administered medications for this visit  Allergies:    Allergies   Allergen Reactions    Other      cats     Primary Language: English  Preferred Language: English     Home Environment/ Lifestyle: Stays with mom in [facility] [place] used to live with nephew in [facility] [address]  Lives with mom, older brother, and aunt  Not working and wasn't before either  Highest Level of Education: High school  Current / Prior Services being received: Physical Therapy and Occupational Therapy  in-patient to in-patient acute rehab now to outpatient    Mental Status: Alert  Behavior Status:Cooperative  Communication Modalities: Verbal  Recent Speech/ Language therapy:Acute hospital setting   Rehabilitation Prognosis:Excellent rehab potential to reach the established goals    Assessments:  1  Oral motor examination:  Labial symmetry (reduced left)   Labial strength (reduced left)  Labial ROM (reduced left)  Labial Sensation [facility])  Facial symmetry (reduced left)   Facial strength (reduced left)  Facial ROM (WFL)  Facial Sensation (WFL)  Lingual symmetry ( WFL)   Lingual strength (WFL)  LingualROM (WFL)  Lingual Sensation (WFL)  Velum ( WFL)  Gag (Pt reported present)  Mandible [facility])  Tracheostomy (no)  Oral secretions (WFL)  Volitional cough (Pt reports no concern)  Dentition (Full dentition)    *Unable to puff cheeks with air and transfer air back and forth between cheeks  Pt's vocal quality was judged to be mildly horse with occasional pitch breaks observed during vowel prolongation task but in no other instances during the evaluation  2  Motor Speech Evaluation:    -Facial appearance Asymmetric smile   -Mandible function Adequate ROM   -Dentition Adequate   -Labial function Decreased ROM (left side) and Decreased strength (left side)   -Lingual function WFL   -Velar function Symmetrical   -Oral Apraxia? Absent   -Vocal Quality Rough and Gurgly   -Volitional Cough not assessed   -Respiration WFL   -Drooling? No   -Tremor/Involuntary Movement? Not present   -Tracheostomy Present? No       1  Sustained phonation    -Vowel prolongation (norm=15-20 sec) Average of 14 49 sec across 3 trials       2   Diadochokinetic (DDK) Rates    -puh-puh-puh (norm=3 0-5 5 rep/1 sec) WFL   -tuh-tuh-tuh (norm=25 rep/10 sec) WFL   -kuh-kuh-kuh (norm=25 rep/10 sec) WFL   -puh-tuh-kuh (norm=8 rep/10 sec) WFL       3  Articulation    -Single syllable words 100% intelligible   -Multisyllabic words 100% intelligible   -Repetition of multisyllabic words 5x 100% intelligible   -Words of progressive length 100% intelligible   -Sentences 100% intelligible   -Reading ([place]) Not completed   -Conversation 100% intelligible       4  Counting    -1 to 20 Not completed   -20 to 1 100% acc 100% intelligible     Patient presents with Mild dysarthria characterized by Slow rate  3  DYSPHAGIA EVALUATION:    Previous VBS history: Pt has not participated in a VBS  -Subjective report of swallowing difficulty: Left buccal pocketing  Pt also reporting that directly after CVA, a "gagging" sensation while swallowing foods that were more difficuly to masticate  -Difficulty swallowing: Solids    -Current diet (solids): Regular  -Current diet (liquids): Thin  -Alternative Feeding Method?: No      Behavior/Cognition: alert    Speech/Language Status: able to participate in conversation    Patient Positioning: upright in chair    LIQUID CONSISTENCY TESTIN  Liquid Consistency (Thin)   Administered by: Cup and Straw Single and consecutive sips   Strategies, attempts, and responses: None    CLINICAL FINDINGS:   Oral phase impairments: WFL   Pharyngeal Phase Impairments: Other (Very mild delay when chugging thin liquid with straw )    *Laryngeal excursion upon palpation: Appeared WFL     No signs or symptoms of aspiration across all trials  SOLID CONSISTENCY TESTIN   Solid Consistency (Regular and Mixed)   Administered by: Rose Michael and Self-fed   Strategies, attempts, and responses: Other: Lingual sweep    CLINICAL FINDINGS:   Oral phase impairments: Stasis/coating/ pocketing with mixed and dry regular solids   Pharyngeal Phase Impairments: WFL    *Laryngeal excursion upon palpation: Appeared [facility]     No signs or symptoms of aspiration across all trials  SWALLOWING DIAGNOSTIC IMPRESSION:  -Swallowing diagnosis/severity: Mild oral phase dysphagia    -Factors affecting performance: Other Left weakness due to CVA    -Safety concerns: Other Aspiration risk reduced with close adherence to the below recommendations    SAFETY PRECAUTIONS:  -Supervision: Independent     -Strategies: Clear pocketing with tongue sweep and/or liquid wash    -Positioning: Upright position during meals    -Recommend (solids): Regular suggest extra sauces and gravy to keep food moist    -Recommend (liquids): Thin via cup straw single sips  Suggest cold or carbonated beverages to improve timing of swallow     -Recommend (medications): Continue as desired/tolerate  Pt denied any difficulty with meds taken with thin liquids  REFERRALS: None    Pt/Family Education: Initiated, verbal/written/visual demonstration regarding aspiration precautions, swallowing strategies, recommendations and oropharyngeal swallow function/anatomy  Goals  Short Term Goals:   1  Pt will produce short passages (5-8 sentence in length) with increased speaking rate during NMES with min cues to increase speed  2  Pt will complete OME at home 10x each 3x a day to increase ROM and strength of oral structures chelle  3  Pt will complete IOPI assessment to obtain a baseline measure Goals to follow as indicated  4  Pt will chelle utilize swallowing strategy, lingual sweep, when consuming solids to eliminate left buccal pocketing in all instances with NMES in place    Long Term Goals:   1  Pt will increase oral motor function in all environments  2  Pt will decrease signs and symptoms of dysphagia during all instances of consumption       Impressions/ Recommendations  Impressions: Pt presents with mild dysarthria characterized by a slow rate  Pt was 100% intelligible in all speaking tasks   Tongue ROM and strength were WFL, however; decreased left labial ROM and strength contribute to added effort for production and therefore a slower speaking rate  Pt was provided with oral motor exercises targeting the ROM and strength of lips and cheeks  Pt presents with a mild oral phase impairment with regular and soft textured solids characterized by pocketing in the left buccal cavity and a very mild oropharyngeal delay when chugging thin liquids with a straw  Pt was fully aware of all instances of left sided oral residue, clearing chelle with a lingual sweep  Pt was educated on lingual sweep and given oral motor exercises  During swallowing evaluation, pt presented with a rough/hoarse quality specifically during the vowel prolongation task  Pt reports that a change in vocal quality was a result of the CVA  Vocal quality will closely monitored and formally assessed as indicated  Pt is compliant and motivated to participate in therapy resulting in a good prognosis to reach target goals        Recommendations:   Patients would benefit from: Speech/ language therapy and Dysphagia therapy with NMES   Frequency:1-2x weekly   Duration:4-5 weeks   Pt's vocal quality will be monitored    Intervention certification from: 17/83/0900  Intervention certification to: 14/50/3260  Intervention Comments: Visit #1

## 2019-11-05 ENCOUNTER — OFFICE VISIT (OUTPATIENT)
Dept: SPEECH THERAPY | Facility: CLINIC | Age: 44
End: 2019-11-05
Payer: COMMERCIAL

## 2019-11-05 ENCOUNTER — OFFICE VISIT (OUTPATIENT)
Dept: PHYSICAL THERAPY | Facility: CLINIC | Age: 44
End: 2019-11-05
Payer: COMMERCIAL

## 2019-11-05 ENCOUNTER — OFFICE VISIT (OUTPATIENT)
Dept: OCCUPATIONAL THERAPY | Facility: CLINIC | Age: 44
End: 2019-11-05
Payer: COMMERCIAL

## 2019-11-05 DIAGNOSIS — R47.1 DYSARTHRIA: ICD-10-CM

## 2019-11-05 DIAGNOSIS — R13.11 DYSPHAGIA, ORAL PHASE: ICD-10-CM

## 2019-11-05 DIAGNOSIS — I61.9 INTRAPARENCHYMAL HEMORRHAGE OF BRAIN (HCC): Primary | ICD-10-CM

## 2019-11-05 DIAGNOSIS — I61.9 NONTRAUMATIC INTRACEREBRAL HEMORRHAGE, UNSPECIFIED CEREBRAL LOCATION, UNSPECIFIED LATERALITY (HCC): Primary | ICD-10-CM

## 2019-11-05 DIAGNOSIS — I61.9 INTRAPARENCHYMAL HEMORRHAGE OF BRAIN (HCC): ICD-10-CM

## 2019-11-05 PROCEDURE — 97110 THERAPEUTIC EXERCISES: CPT

## 2019-11-05 PROCEDURE — 97112 NEUROMUSCULAR REEDUCATION: CPT

## 2019-11-05 PROCEDURE — 92507 TX SP LANG VOICE COMM INDIV: CPT | Performed by: NURSE PRACTITIONER

## 2019-11-05 NOTE — PROGRESS NOTES
Speech-Language Pathology Treatment Note    Today's date: 2019  Patient name: Alphonzo Simmonds  : 1975  MRN: [id number]  Referring provider: Vivi Donnelly MD  Dx:   Encounter Diagnosis     ICD-10-CM    1  Intraparenchymal hemorrhage of brain (HCC) I61 9    2  Dysphagia, oral phase R13 11    3  Dysarthria R47 1      Medical History significant for:   Past Medical History:   Diagnosis Date    Depression     Hypertension     Migraine     Stroke ([facility] [address] )      Flowsheet:  Start Time: 1000  Stop Time: 1100  Total time in clinic (min): 60 minutes    Subjective: Blood pressure medication was adjusted yesterday  Pt feeling so tired this past week he was unable to do any home programs but "wants to " Pt stadning up and feeling light headed , "so close to fainting " Pt also did not "eat all day " Pt was not consistently eating or drinking properly  Now starting new regiman of eating and drinking more regularly throughout the day  Increased verbal communication at home to practice speech  Objective:  1  Pt will produce short passages (5-8 sentence in length) with increased speaking rate during NMES with min cues to increase speed 11/5 NMES 5 0 facial placement manipulatives comfortable <5  Min )  Naming abstract pictures 25 words 1 min  2  Pt will complete OME at home 10x each 3x a day to increase ROM and strength of oral structures chelle 11/5 lip trills unsuccessful today  Able to sustain intraoral pressure, transferring it is still difficult on the left, able to transfer 5 times succesfully without air loss  3  Pt will complete IOPI assessment to obtain a baseline measure Goals to follow as indicated  The IOPI Pro is used by medical professionals to measure, evaluate, and increase the strength and endurance of the tongue and lip in patients with oral motor disorders, including dysphagia and dysarthria    The IOPI measures the maximum pressure (Pmax) a patient can produce in an air-filled bulb when it is compressed as hard as possible by the tongue or lip against a hard surface (e g  The palate or teeth, respectively)  Pmax is a measure of strength, expressed in kilopascals (kPa, an international unit of pressure)  For patients with dysphagia or dysarthria, oral motor fatigability may be of interest   The IOPI Pro can be used to assess tongue fatigability  Low endurance values are an indicator of a high fatigability  Endurance is measured with the IOPI Pro by quantifying the length of time that a patient can maintain 50% of his or her Pmax  This procedure is conducted in Target Mode by setting the target value to 50% of the patient's Pmax and timing how long the patient can hold the top (green) light on  The medical professional determines what target value is appropriate for exercise therapy purposes and provides specific instructions to the patient for a particular exercise protocol  In Target Mode, the pressure required to illuminate the green light a the top of the light array can be adjusted using the Set Target arrow buttons  This green light is used as a visual target for the patient  Tongue tip mean peak max across 3 trials was 42 Normative data mean for a middle aged male (ages 38-57) is 61   Tongue back mean peak max across 3 trials was 40 Normative data mean for a middle aged male is usually 5-10% lower than anterior tongue strength   Right lip mean peak max across 3 trials was 29 Normative data mean for a male is 28  Left lip mean peak max across 3 trials was 16 Normative data mean for a male is 35        4  Pt will chelle utilize swallowing strategy, lingual sweep, when consuming solids to eliminate left buccal pocketing in all instances with NMES in place  11/5 pt denies any difficulty with oral residue since last session  4 oz of thin liquids via single controlled cup sips @ 100% chelle  4 oz thin liquids via single controlled straw sips with increased delayed swallow   Change at home with meals cup only and can use straw with liquids only and not during a meal      5  NEW  Patient will improve left lip strength to 80% max (13) 10x across 3 sets with 3 second holds  Assessment: Increased hiccups since stroke  Pt reports "Occasional coughing with thin liquids and sometimes solids after the swallow"         Plan:  Patients would benefit from: Speech/ language therapy and Dysphagia therapy with NMES   Frequency:1-2x weekly   Duration:4-5 weeks   Pt's vocal quality will be monitored    Intervention certification from: 78/59/9843  Intervention certification to: 22/64/2322  Intervention Comments: Visit #2    Homework: 11/5 start oral motor exercises    Initial evaluation 10/31/19

## 2019-11-05 NOTE — PROGRESS NOTES
Daily Note     Today's date: 2019  Patient name: Lara Quevedo  : 1975  MRN: [id number]  Referring provider: Hunter Hoyos MD  Dx:   Encounter Diagnosis     ICD-10-CM    1  Intraparenchymal hemorrhage of brain (HCC) I61 9                   Subjective: I have not been feeling great since I left hospital   reduce BP meds as BP low      Objective: See treatment diary below      Assessment: Tolerated treatment fair  Pt co's of increase feeling of overall weakness and sensation of possible passing out  Vitals monitor t/o, WNL  Pt required seated rest and required additional time to complete TE  Progress with POC as tolerate  Patient would benefit from continued PT      Plan: Continue per plan of care        Precautions: Falls  Re-eval Date: 19    Date 10/29/19 11/5      Visit Count 1 2      FOTO completed       Pain In See IE       Pain Out See IE         Manual  upcoming         Exercise Diary  140s/90s with activity   120/72      Aerobic Elliptical L1  10 mins Nustep  L3 10 min      SLR x 3 Stand   Foam   2x10 ea  1 HHA      HR/TR Stand  Foam   30x ea  2 HHA/finger tips      Mini Squats Stand  Foam   -->              Natus                ADL Suite        Outdoors/obstacle course            Modalities          prn

## 2019-11-05 NOTE — PROGRESS NOTES
Daily Note                    Today's date: 2019  Patient name: Felicia Chávez  : 1975  MRN: [id number]  Referring provider: Benjamin Spencer MD  Dx:   Encounter Diagnosis     ICD-10-CM    1  Nontraumatic intracerebral hemorrhage, unspecified cerebral location, unspecified laterality ([facility] [address] ) I61 9    2  Intraparenchymal hemorrhage of brain (HCC) I61 9             Subjective: ' I just don't feel good    I feel like I'm going to pass out ' ( Pt reports to  yesterday and that blood pressure meds are being monitored and adjusted '           Objective: See treatment diary below  Manual  10/31/19 11/5/19                                                  Exercise Diary  10/31/19 11/5/19      T-putty  Without objects    With objects Red HEP   10x  Pinch 5x       Digiflex    indiv digits  Green   30  difficulty      Tension pins        FMC  Grooved pegs  Rice w/ objects  Beans w/ tweezers  Nuts and bolts      3 rows 2:27        1x each size (4)      Trampoline w/ medicine ball  Level 5 green ball 40x using both hands, right hand only   left hand only       Sensory sticks  Palm/dorsal left hand        UBC  5 min      Body blade        Catching ball        T-band   Row   Ext   shld FF  ABD  ER  Red  10  10              dynavision board                                                                                              Modalities                                          Assessment: Tolerated treatment fair  Pt voices multiple c/o of not feeling well and apprehension of standing activity  Tx modified due to c/o's  Fine motor coordination slowed with decreased control as pt fatigues  Pt does become distracted easily, needing cues to use LUE as he tends to use only RUE on B tasks(t-band ex)  Patient demonstrated fatigue post treatment and would benefit from continued OT      Plan: Continue per plan of care        Precautions: Falls

## 2019-11-07 ENCOUNTER — OFFICE VISIT (OUTPATIENT)
Dept: PHYSICAL THERAPY | Facility: CLINIC | Age: 44
End: 2019-11-07
Payer: COMMERCIAL

## 2019-11-07 ENCOUNTER — OFFICE VISIT (OUTPATIENT)
Dept: SPEECH THERAPY | Facility: CLINIC | Age: 44
End: 2019-11-07
Payer: COMMERCIAL

## 2019-11-07 ENCOUNTER — OFFICE VISIT (OUTPATIENT)
Dept: OCCUPATIONAL THERAPY | Facility: CLINIC | Age: 44
End: 2019-11-07
Payer: COMMERCIAL

## 2019-11-07 DIAGNOSIS — I61.9 NONTRAUMATIC INTRACEREBRAL HEMORRHAGE, UNSPECIFIED CEREBRAL LOCATION, UNSPECIFIED LATERALITY (HCC): Primary | ICD-10-CM

## 2019-11-07 DIAGNOSIS — R47.1 DYSARTHRIA: ICD-10-CM

## 2019-11-07 DIAGNOSIS — I61.9 INTRAPARENCHYMAL HEMORRHAGE OF BRAIN (HCC): ICD-10-CM

## 2019-11-07 DIAGNOSIS — I61.9 INTRAPARENCHYMAL HEMORRHAGE OF BRAIN (HCC): Primary | ICD-10-CM

## 2019-11-07 DIAGNOSIS — R13.11 DYSPHAGIA, ORAL PHASE: ICD-10-CM

## 2019-11-07 PROCEDURE — 97112 NEUROMUSCULAR REEDUCATION: CPT

## 2019-11-07 PROCEDURE — 97112 NEUROMUSCULAR REEDUCATION: CPT | Performed by: PHYSICAL THERAPIST

## 2019-11-07 PROCEDURE — 97110 THERAPEUTIC EXERCISES: CPT

## 2019-11-07 PROCEDURE — 97110 THERAPEUTIC EXERCISES: CPT | Performed by: PHYSICAL THERAPIST

## 2019-11-07 PROCEDURE — 92507 TX SP LANG VOICE COMM INDIV: CPT | Performed by: NURSE PRACTITIONER

## 2019-11-07 NOTE — PROGRESS NOTES
Daily Note     Today's date: 2019  Patient name: Andreas Green  : 1975  MRN: [id number]  Referring provider: Anette Celestin MD  Dx:   Encounter Diagnosis     ICD-10-CM    1  Nontraumatic intracerebral hemorrhage, unspecified cerebral location, unspecified laterality ([facility] [address] ) I61 9    2  Intraparenchymal hemorrhage of brain (HCC) I61 9                   Subjective: I lack feeling in this left hand  Objective:   Manual  10/31/19 11/5/19                                                                                     Exercise Diary  10/31/19 11/5/19  11/7/19       T-putty  Without objects     With objects Red HEP   10x  Pinch 5x           Digiflex    indiv digits   Green   30  difficulty Green  30   30       Tension pins      all on and off       FMC  Grooved pegs  Rice w/ objects  Beans w/ tweezers  Nuts and bolts         3 rows 2:27           1x each size (4)   All in and out  4 min 10 sec  attempted       Trampoline w/ medicine ball  Level 5 green ball 40x using both hands, right hand only   left hand only           Sensory sticks  Palm/dorsal left hand             UBC   5 min  6 min       Body blade             Catching ball             T-band   Row   Ext   shld FF  ABD  ER   Red  10  10                     dynavision board      A 36/1 67  42/1 43  56/1 07  55/1 07                       beans off table thumb->IF,MF,RF,SF      x30                                                                                                                              Modalities                                                            Assessment: Tolerated treatment fair  Patient demonstrated fatigue post treatment and would benefit from continued OT  Pt demonstrated difficulty picking up beans with thumb and small finger  Unable to  beans from bowl or table using tweezers, pt kept dropping beans or unable to squeeze/pinch tweezers tight enough with left hand   Pt easily distracted forgets task or proper technique he's doing  Plan: Continue per plan of care  Progress treatment as tolerated

## 2019-11-07 NOTE — PROGRESS NOTES
Speech-Language Pathology Treatment Note    Today's date: 2019  Patient name: Alva Nichols  : 1975  MRN: [id number]  Referring provider: Oswaldo Lovett MD  Dx:   Encounter Diagnosis     ICD-10-CM    1  Intraparenchymal hemorrhage of brain (HCC) I61 9    2  Dysphagia, oral phase R13 11    3  Dysarthria R47 1      Medical History significant for:   Past Medical History:   Diagnosis Date    Depression     Hypertension     Migraine     Stroke ([facility] [address] )      Flowsheet:  Start Time: 1030  Stop Time: 1130  Total time in clinic (min): 60 minutes    Subjective: Pt reported that he is feeling much better today than in previous session  His blood pressure medication was recently adjusted and he reports being less tired all day  After today, pt feels that he can handle 3 disciplines in one day  Pt also reported that he is color blind  Objective:  1  Pt will produce short passages (5-8 sentence in length) with increased speaking rate during NMES with min cues to increase speed  NMES 5 0 facial placement manipulatives comfortable <5  Min )  Naming abstract pictures 25 words 1 min  : NMES 4 0 facial placement reading 5-8 sentence paragraphs and answering comprehension questions  Pt reporting increased sensations on left side today with NMES in place  2  Pt will complete OME at home 10x each 3x a day to increase ROM and strength of oral structures chelle  lip trills unsuccessful today  Able to sustain intraoral pressure, transferring it is still difficult on the left, able to transfer 5 times succesfully without air loss  3  Pt will complete IOPI assessment to obtain a baseline measure Goals met see note from      4  Pt will chelle utilize swallowing strategy, lingual sweep, when consuming solids to eliminate left buccal pocketing in all instances with NMES in place   pt denies any difficulty with oral residue since last session  4 oz of thin liquids via single controlled cup sips @ 100% chelle   4 oz thin liquids via single controlled straw sips with increased delayed swallow  Change at home with meals cup only and can use straw with liquids only and not during a meal      5  NEW  Patient will improve left lip strength to 80% max (13) 10x across 3 sets with 3 second holds  11/7: Completed with moderate cues initially and decreased to minimal cues over time  It was very effortful for the pt to control bulb with lips and therefore he required to hold it in with his fingers  5  NEW 11/7  Pt will improve attention during divided attention tasks with decreased rate and cueing over time 11/7: Pt verbally answering comprehension questions asked by the therapist after reading a paragraph with distraction (e g  Music) chelle @81% accy  Pt sorting Blink cards by shape/number with music distraction  Trial 1- 1 error in 3:50, Trial 2- 2 errors 3:50  Verbal labeling Blink cards by shape/number with music distraction  Trial 1- 5 errors in 2:35, Trial 2- 5 errors in 2:25, Trial 3- 2 errors in 2:20  Assessment: Pt benefited from a choice of 2 if he did not know the answer to a comprehension question  While sorting Blink cards (both self-sorting and verbal sorting) pt increased in accuracy and decreased in time as the trials progressed  Pt was quick to learn sorting task and noted that he was generally able to block out distracting music  Pt reported increased sensation on left side with NMES in place!       Plan:  Patients would benefit from: Speech/ language therapy and Dysphagia therapy with NMES   Frequency:1-2x weekly   Duration:4-5 weeks   Pt's vocal quality will be monitored    Intervention certification from: 79/04/5822  Intervention certification to: 72/84/4147  Intervention Comments: Visit #3    Homework: 11/5 start oral motor exercises    Initial evaluation 10/31/19

## 2019-11-07 NOTE — PROGRESS NOTES
Daily Note     Today's date: 2019  Patient name: Angel Pimentel  : 1975  MRN: [id number]  Referring provider: Caitlin Otto MD  Dx:   Encounter Diagnosis     ICD-10-CM    1  Intraparenchymal hemorrhage of brain (HCC) I61 9                   Subjective: Patient reports that he is very tired today  Patient reports almost fainting at home recently due to decreased BP (~88/60 mmHg)  Monitored patient's BP after warm up: 120/90 mmHg  Patient notes physician decreased meds  Objective: See treatment diary below      Assessment: Tolerated treatment well  Patient demonstrated fatigue post treatment and would benefit from continued PT intervention  Patient has impaired balance and coordination during functional mobility and left sided weakness noted in L hip musculature  Patient continuously neglected LUE and dropped object from hand during [address] activities  Plan: Continue per plan of care  Patient treated by DANY Colon under direct PT supervision  Precautions: Falls  Re-eval Date: 19    Date 10/29/19 11/5 11/7     Visit Count 1 2 3     FOTO completed       Pain In See IE       Pain Out See IE         Manual  upcoming         Exercise Diary  140s/90s with activity   120/72 120/90     Aerobic Elliptical L1  10 mins Nustep  L3 10 min Nustep   L3 15 min     SLR x 3 Stand   Foam   2x10 ea  1 HHA      HR/TR Stand  Foam   30x ea  2 HHA/finger tips      Mini Squats Stand  Foam   -->              Natus   20 mins              ADL Suite        Hip strength   Tall kneeling while pressing 2 kg ball overheard, UE rows w/ green TB, tricep ext with green TB, 10 ea  exercise    Half kneeling while perf D1 w/ green theraband, 2 kg ball diagonal x 10 ea       Core strength   Plank with alt UE rotation x 10     Quad alt UE/LE ext 2 x 10     Outdoors/obstacle course            Modalities          prn

## 2019-11-08 ENCOUNTER — TRANSCRIBE ORDERS (OUTPATIENT)
Dept: ADMINISTRATIVE | Facility: HOSPITAL | Age: 44
End: 2019-11-08

## 2019-11-08 DIAGNOSIS — I61.9 NONTRAUMATIC INTRACEREBRAL HEMORRHAGE, UNSPECIFIED CEREBRAL LOCATION, UNSPECIFIED LATERALITY (HCC): Primary | ICD-10-CM

## 2019-11-11 NOTE — UTILIZATION REVIEW
URGENT/EMERGENT  INPATIENT/SPU AUTHORIZATION REQUEST    Date: 11/11/19            # Pages in this Request:     x New Request   Additional Information for PA#:     Office Contact Name:  Lisa Pulido Title: Utilization Review, Alvaro Nurse     Phone: [phone]  Ext  Availability (Date/Time): Wednesday - Friday 8 am- 4 pm    x Inpatient Review  SPU Review       x Current        Late Pick-up   · How your facility was first notified of the Late Pick-up:  · When your facility was first notified of the Late Pick-up (date):         RECIPIENT INFORMATION    Recipient ID#: [id number]   Recipient Name: Pillo Brunson   YOB: 1975  45 y o  Recipient Alias:     Gender:  x Male  Female Medicaid Eligibility ([address] [place] [address]): INSURANCE INFORMATION    (All other private or governmental health insurance benefits must be utilized prior to billing the [place] Program)    Was this admission the result of an MVA, other accident, assault, injury, fall, gunshot, bite etc ? Yes x No                   If yes, provide a brief description of the incident  Does the recipient have other insurance coverage? Yes x No        Insurance Company Name/Policy #      Did that insurance pay on this claim? Yes  No        Did that insurance deny this claim? Yes  No    If yes, reason for denial:      Does the recipient have Medicare? Yes x No        Did Medicare exhaust prior to this admission? Yes  No        Did Medicare partially pay this claim? Yes  No        Did that insurance deny this claim? Yes  No    If yes, reason for denial:          Was the recipient a prisoner at the time of admission?   Yes x No            PROVIDER INFORMATION    Hospital Name: [facility] Provider ID#: [id number]    Admitting Physician Name: [address] [place] [address] Provider ID#: [id number]        ADMISSION INFORMATION    Type of Admission: (please choose one)     ED     x Direct    If yes, from where? [facility]     Transfer    If yes, transferring hospital (inpatient, rehab, or psych) Provider Name/Provider ID#: Admission Floor or Unit Type: Critical care     Dates/Times:        ED Date/Time:         Observation Date/Time:        Admission Date/Time: 10/14/19 1646        Discharge or Transfer Date/Time: 10/19/2019  1:18 PM        DIAGNOSIS/PROCEDURE CODES    Primary Diagnosis Code/Primary Diagnosis Code description:  I61 9 Nontraumatic intracerebral hemorrhage, unspecified   G81 94 Hemiplegia, unspecified affecting left nondominant side   R45 851 Suicidal ideations    Additional Diagnosis Code(s) and Description(s)-(up to three additional codes):    Procedure Code (one) and description:        CLINICAL INFORMATION - PRIOR ADMISSION ONLY    Is there a prior admission with a discharge date within 30 days of the date of this admission?    x No (Proceed to the next section - "Clinical Information - General Review Checklist:)      Yes (Provide the following information)     Prior admission dates:    MA Prior Authorization Number:        Review Outcome:     Diagnosis Code(s)/Description:    Procedure Code/Description:    Findings:    Treatment:    Condition on Discharge:   Vitals:    Labs:   Imaging:   Medications:     Follow-up Instructions:    Disposition:        CLINICAL INFORMATION - GENERAL REVIEW CHECKLIST    EMERGENCY DEPARTMENT: (Proceed to "ADMISSION" if Direct Admission)    Presenting Signs/Symptoms:    Medication/treatment prior to arrival in the ED:    Past Medical History: x  Clinical Exam:    Initial Vital Signs: (Temp, Pulse, Resp, and BP)    Vitals   Temperature Pulse Respirations Blood Pressure SpO2   10/14/19 1651 10/14/19 1651 10/14/19 1656 10/14/19 1651 10/14/19 1651   98 °F (36 7 °C) 84 17 151/76 97 %      Temp Source Heart Rate Source Patient Position - Orthostatic VS BP Location FiO2 (%)   10/14/19 1651 10/14/19 2000 10/14/19 2000 10/14/19 2000 --   Oral Monitor Lying Left arm       Pain Score       10/14/19 1651       Worst Possible Pain           Pertinent Repeat Vital Signs: (include times they were obtained)    Pertinent Sustained Findings: (include times they were obtained)    Weight in Kilograms:    Pertinent Labs (results):    Radiology (results):    EKG (results): Other tests (results):    Tests pending final results:    Treatment in the ED:      Other treatments:      Change in condition while in the ED:     Response to ED Treatment:          OBSERVATION: (Proceed to "ADMISSION" if Direct Admission)    Orders written during the observation period  Meds Name, dose, route, time, how may doses given:  PRN Meds Name, dose, route, time, how many doses given within the first 24 hrs :  IVs Type, rate, and total amt  ordered/given:  Labs, imaging, other:  Consults and findings:    Test Results during the observation period  Pertinent Lab tests (dates/results):  Culture results (blood, urine, spinal, wound, respiratory, etc ):  Imaging tests (dates/results):  EKG (dates/results): Other test (dates/results):  Tests pending (dates/results):    Surgical or Invasive Procedures during the observation period  Name of surgery/procedure:  Date & Time:  Patient Response:  Post-operative orders:  Operative Report/Findings:    Response to Treatment, Major Change in Condition, Major Charge in Treatment during the observation period          ADMISSION:    DIRECT Admissions Only:    Presenting Signs/Symptoms: 46 yo m with a PMH of migraine headaches and depression  He presented to the ER at [facility] with acute onset of left side facial weakness and LUE weakness  Of 20 minutes duration  He reports feeling something was wrong while lifting weights this morning  CT head revealed acute intraparenchymal hemorrhage centered at the right lentiform nucleus with mild surrounding vasogenic edema; no significant midline shift  CTA head/neck unremarkable   NIHSS score of 2; ICH score of 0   Patient was subsequently transferred to and admitted inpatient to  [facility] for evaluation and management of intraparenchymal brain hemorrhage  ·    ·   · Medication/treatment prior to arrival:  ·   · Past Medical History: Active Ambulatory Problems     Diagnosis Date Noted    Junctional bradycardia 10/19/2019    Thrombocytopenia ([address] ) 10/19/2019     Past Medical History:   Diagnosis Date    Depression     Hypertension     Migraine     Stroke [facility])      ·   Clinical Exam on admission: Neurological: He is alert and oriented to person, place, and time  He displays abnormal reflex  A cranial nerve deficit and sensory deficit is present  He exhibits abnormal muscle tone    Bilateral left visual field loss; complete loss of sensation over left forehead, cheek, and chin; persistent left facial droop with interval improvement in ability to raise left eye brow, wrinkle left forehead, and squeeze left eye shut (increased resistance to opening); left-sided tongue deviation (unable to deviate tongue to right side); weak shoulder shrug on left side; flaccid paralysis of left upper extremity with interval recovery of sensation over left upper extremity (biceps and triceps compartment); 4/5 left lower extremity strength, interval recovery of sensation over left lower extremity (left foot), and left-sided patellar hyperreflexia; able to wiggle left toes slightly on command    · Occasionally places hand over right eye and grimaces secondary to ongoing retro-orbital pain    ·   · Vital Signs on admission: (Temp, Pulse, Resp, and BP)   Vitals   Temperature Pulse Respirations Blood Pressure SpO2   10/14/19 1651 10/14/19 1651 10/14/19 1656 10/14/19 1651 10/14/19 1651   98 °F (36 7 °C) 84 17 151/76 97 %      Temp Source Heart Rate Source Patient Position - Orthostatic VS BP Location FiO2 (%)   10/14/19 1651 10/14/19 2000 10/14/19 2000 10/14/19 2000 --   Oral Monitor Lying Left arm       Pain Score       10/14/19 1651       Worst Possible Pain     Date/Time   Temp   Pulse   Resp   BP   MAP (mmHg)   SpO2   O2 Device   Patient Position - Orthostatic VS   10/15/19 1001            135/73               10/15/19 1000      74   10Abnormal    122/68   89   98 %         10/15/19 0953      78   15   122/68   89   99 %      Lying   10/15/19 0923      54Abnormal    11Abnormal          99 %         10/15/19 0803      68   13         97 %         10/15/19 0800      64   13   135/82   106   97 %   None (Room air)   Lying   10/15/19 0733      72   15   139/79   104   98 %         10/15/19 0715   98 2 °F (36 8 °C)   70   16         98 %         10/15/19 0700      54Abnormal    10Abnormal    129/80   96   98 %   None (Room air)      10/15/19 0600      58   10Abnormal    133/78   96   98 %      Lying   10/15/19 0500      62   10Abnormal    131/72   94   99 %      Lying   10/15/19 0400   98 1 °F (36 7 °C)   62   10Abnormal    124/71   88   97 %   None (Room air)   Lying   10/15/19 0300      84   21   116/69   89   98 %         10/15/19 0200      74   13   127/72   86   98 %         10/15/19 0100      70   10Abnormal    117/66   84   98 %      Lying   10/15/19 0000   98 2 °F (36 8 °C)   76   13   131/71   93   98 %      Lying   10/14/19 2318      72   12   127/72   91   98 %   Nasal cannula   Lying   10/14/19 2100      76   13   138/71   91   98 %      Lying   10/14/19 2000      72   13   136/71   93   99 %      Lying      ·   · Weight in kilograms:   Wt Readings from Last 1 Encounters:   10/22/19 69 7 kg (153 lb 11 2 oz)     ALL Admissions:    Admission Orders and Other Orders written within the first 24 hrs after admission    Nursing Orders - VS q 2 Neuro checks q 2 - 1:1 behavioral health Watch - SCD's to le's- I & O q shift - PT/OT  Speech eval  - NPO     Meds Name, dose, route, time, how may doses given:  acetaminophen 650 mg Oral Once 10/14   amLODIPine 5 mg Oral Daily         PRN Meds Name, dose, route, time, how many doses given within the first 24 hrs :  acetaminophen 650 mg Oral Q6H PRN     butalbital-acetaminophen-caffeine 1 tablet Oral Q4H PRN 10/15 x 1    fentanyl citrate (PF) 25 mcg Intravenous Q1H PRN 10/14 x 1  10/15 x 4   ondansetron 4 mg Intravenous Q4H PRN 10/14 x 1  10/15 x 1    promethazine 25 mg Intravenous Q6H PRN  10/15 x 1         IVs Type, rate, and total amt   ordered/given:  niCARdipine 1-15 mg/hr Intravenous Titrated      Lactated Ringers 100 ml/hr Intravenous continuous  d/c'd 10/15 @ 09:35       Labs, imaging, other:  Results from last 7 days   Lab Units 10/15/19  0533 10/14/19  1500   WBC Thousand/uL  --  5 20   HEMOGLOBIN g/dL 15 2 15 3   HEMATOCRIT % 42 4 43 6   PLATELETS Thousands/uL  --  147*            Results from last 7 days   Lab Units 10/15/19  0533 10/14/19  1500   SODIUM mmol/L 137 141   POTASSIUM mmol/L 3 6 3 6   CHLORIDE mmol/L 104 105   CO2 mmol/L 23 28   ANION GAP mmol/L 10 8   BUN mg/dL 9 13   CREATININE mg/dL 0 87 1 18   EGFR ml/min/1 73sq m 106 76   CALCIUM mg/dL 8 8 9 5   MAGNESIUM mg/dL 1 9  --    PHOSPHORUS mg/dL 3 1  --            Results from last 7 days   Lab Units 10/14/19  1500   AST U/L 19   ALT U/L 26   ALK PHOS U/L 58   TOTAL PROTEIN g/dL 6 9   ALBUMIN g/dL 4 7   TOTAL BILIRUBIN mg/dL 0 60           Results from last 7 days   Lab Units 10/14/19  1445   POC GLUCOSE mg/dl 89            Results from last 7 days   Lab Units 10/15/19  0533 10/14/19  1500   GLUCOSE RANDOM mg/dL 107 101*           Results from last 7 days   Lab Units 10/15/19  0533   HEMOGLOBIN A1C % 4 2   EAG mg/dl 74           Results from last 7 days   Lab Units 10/14/19  1500   TROPONIN I ng/mL <0 03           Results from last 7 days   Lab Units 10/14/19  1500   PROTIME seconds 12 0   INR   1 03   PTT seconds 37           Results from last 7 days   Lab Units 10/14/19  1752   AMPH/METH   Negative   BARBITURATE UR   Negative   BENZODIAZEPINE UR   Negative   COCAINE UR   Negative   METHADONE URINE   Negative   OPIATE UR   Negative   PCP UR   Negative   THC UR   Negative         Consults and findings:    Test Results after admission  Pertinent Lab tests (dates/results):  Culture results (blood, urine, spinal, wound, respiratory, etc ):  Imaging tests (dates/results):  CXR 10/14 - no acute  CTA Head & Neck - 10/14 - 1   Unremarkable CT angiogram of the brain   No CTA spot sign  2   Unremarkable CT angiogram of the neck      CT Brain - 10/14 - Acute intraparenchymal hemorrhage centered at the right lentiform nucleus with mild surrounding vasogenic edema   No significant mass effect or midline shift      MRI Brain 10/15 - Interval expansion of the right lentiform nucleus hematoma with hyperacute, acute and subacute blood products  The hematoma cavity demonstrates interval expansion when compared to the recent CT scan, now measuring 22 mL  Presumed etiology is hypertension however, correlation for underlying coagulopathy is recommended         There is mild surrounding edema and mass effect on the right ventricular system with asymmetric dilatation of the right temporal horn which may represent early entrapment  Follow-up CT evaluation is recommended  Ct Head 10/16 -  No significant interval change in size of a right basal ganglia intraparenchymal hematoma since 10/15/2019  No new areas of acute intracranial hemorrhage are identified        EKG (dates/results):EKG- 10/14 - NSR - with nonspecific ST & T wave changes  Other test (dates/results):  Tests pending (dates/results):    Surgical or Invasive Procedures  Name of surgery/procedure:  Date & Time:  Patient Response:  Post-operative orders:  Operative Report/Findings:    Response to Treatment, Major Change in Condition, Major Charge in Treatment anytime during admission     10/15 - On exam he has persistent left facial droop and hemiparesis with interval symptomatic improvement- will needs formal speech eval prior to PO intake, following failure of bedside dysphagia assessment  remains on IVF @ 100 ml/hr - cardene gtt  For BP control - He had an episode of nausea and emesis  Due to persistent  Right- side retro- orbital pain         45 y o with chronic migraine headaches and depression treated with citalopram presents as a transfer from [facility]  Patient originally presented for evaluation of acute left-sided facial weakness, left arm weakness  This occurred while lifting weights earlier that day  At the time, vital Signs significant for blood pressure of 208/129 otherwise stable  CT head demonstrated acute intraparenchymal hemorrhage centered at the right lentiform nucleus with surrounding vasogenic edema  NIH stroke scale score of 2, ICH score of 0  Patient was transferred to [facility] for management of intra parenchymal brain hemorrhage       Upon arrival, patient was alert and oriented x4 and saturating well on room air  Endorsed right eye pain and drowsiness; denied headaches, fevers, chills, vision changes, lightheadedness, dizziness, chest pain, and shortness of breath  Physical examination was significant for bilateral left visual field loss, complete loss of sensation over left face; left facial droop, inability to deviate tongue towards right side, flaccid paralysis of left upper extremity with complete loss of sensation, 3/5 left lower extremity extremity strength with complete loss of sensation, and left patellar hyperreflexia  Patient was subsequently started on nicardipine drip with Q1 neuro checks  Upon chart review, patient was also noted to have experienced suicidal ideation over the last three months, prompting inpatient psychiatry consultation and continuous 1-to-1 observation (seen and evaluate; 1-to-1 discontinued and started on zyprexa for headaches and depression)   Over the next two days patient exhibited progressive clinical improvement with nearly full recovery of strength in left upper and lower extremity, however repeat imaging indicated that initial hematoma had undergone interval increase in size  Patient was deemed stable for transfer out of the critical care unit the morning of 10/16/2019, however unit transfer was postponed until the afternoon pending results of repeat CT head       Once out of ICU, patient endorsed improvement of focal neurologic deficits  Psychiatry evaluated patient and he was more euthymic resulting in discontinuation of 1 to 1 once patient denied further suicidal ideation, plan, or intent  Patient continued to have migraines, especially overnight, which improved with IV Bendaryl, IV Reglan, and IV MgSO4  Patient also continued to have systolic pressures >997 requiring IV hydralazine  He was started on lisinopril 10 mg  For migraine headaches, patient was started on nortriptyline for prophylaxis  Prior to discharge, lisinopril increased to 20 mg  Patient will continue both amlodipine and lisinopril for hypertension management  Neurosurgery recommends CTA within the next week, start 81 mg ASA qd on 10/21  Continue migraine ppx with nortriptyline  For migraine abortive therapy, tyelenol 650 mg until 10/21 when Excedrin can be restarted as needed  Neurology outpatient follow-up and PCP follow-up within the month  Disposition on Discharge  Home, Rehab, SNF, LTC, Shelter, etc : Home/Self Care    Cease to Breathe (CTB)  If a patient expires during an admission, in addition to the above information, please include:    Summary/timeline of the patient's decline in condition:    Medications and treatment:    Patient response to treatment:    Date and time patient ceased to breathe:        Is there a Readmission that follows this admission?    Yes x No    If yes, provide dates:          InterQual Review    InterQual Criteria Met: x Yes  No  N/A        Please include the InterQual Review, InterQual year/version used, and the criteria selected:   Created Using Review Status Review Karen Hernandez In Primary 10/15/2019 10:37       Criteria Set Name - Subset   LOC:Acute Adult-Stroke       Criteria Review   REVIEW SUMMARY     Patient: Venkat Fitch  Review Number: [id number]  Review Status: In Primary  Criteria Status: Intermediate Met  Day Met: Episode Day 1     Condition Specific: Yes        OUTCOMES  Outcome Type: Primary           REVIEW DETAILS     Product: Alex Cocks Adult  Subset: Stroke      (Symptom or finding within 24h)         (Excludes PO medications unless noted)          [X] Select Day, One:              [X] Episode Day 1, One:                  [X] INTERMEDIATE, All:                      [X] Contraindicated, Both:                          [X] Thrombolysis, contraindicated                          [X] Endovascular intervention, contraindicated                      [X] Finding, Both:                          [X] Neurological deficit, >= One:                              [X] Paresis                              [X] Visual field loss                          [X] Finding by computed tomography (CT) or magnetic resonance imaging (MRI), >= One:                              [X] Hemorrhage                      [X] Intervention, >= One:                          [X] Neurological assessment at least every 3h, <= 2d                          [X] Oxygen >= 40%(0 40) <= 2d                          [ ] Seizure, Both:                              [X] Seizure activity resolved     Version: InterQual® 2018  2  InterQual® and InterQual®  © 2018 [employer] and/or one of its [place]  All Rights Reserved  CPT only © 2017 [employer]  All Rights Reserved  PLEASE SUBMIT THE COMPLETED FORM TO THE DEPARTMENT OF HUMAN SERVICES - DIVISION OF CLINICAL  REVIEW VIA FAX [phone] or VIA E-MAIL TO [email or web address]    Signature: Manjeet Trinh Date:  11/11/19    Confidentiality Notice:  The documents accompanying this telecopy may contain confidential information belonging to the sender  The information is intended only for the use of the individual named above  If you are not the intended recipient, you are hereby notified  That any disclosure, copying, distribution or taking of any telecopy is strictly prohibited

## 2019-11-12 ENCOUNTER — OFFICE VISIT (OUTPATIENT)
Dept: OCCUPATIONAL THERAPY | Facility: CLINIC | Age: 44
End: 2019-11-12
Payer: COMMERCIAL

## 2019-11-12 ENCOUNTER — OFFICE VISIT (OUTPATIENT)
Dept: PHYSICAL THERAPY | Facility: CLINIC | Age: 44
End: 2019-11-12
Payer: COMMERCIAL

## 2019-11-12 ENCOUNTER — OFFICE VISIT (OUTPATIENT)
Dept: SPEECH THERAPY | Facility: CLINIC | Age: 44
End: 2019-11-12
Payer: COMMERCIAL

## 2019-11-12 DIAGNOSIS — I61.9 INTRAPARENCHYMAL HEMORRHAGE OF BRAIN (HCC): Primary | ICD-10-CM

## 2019-11-12 DIAGNOSIS — R47.1 DYSARTHRIA: ICD-10-CM

## 2019-11-12 DIAGNOSIS — I61.9 INTRAPARENCHYMAL HEMORRHAGE OF BRAIN (HCC): ICD-10-CM

## 2019-11-12 DIAGNOSIS — R13.11 DYSPHAGIA, ORAL PHASE: ICD-10-CM

## 2019-11-12 DIAGNOSIS — I61.9 NONTRAUMATIC INTRACEREBRAL HEMORRHAGE, UNSPECIFIED CEREBRAL LOCATION, UNSPECIFIED LATERALITY (HCC): Primary | ICD-10-CM

## 2019-11-12 PROCEDURE — 97110 THERAPEUTIC EXERCISES: CPT

## 2019-11-12 PROCEDURE — 97110 THERAPEUTIC EXERCISES: CPT | Performed by: PHYSICAL THERAPIST

## 2019-11-12 PROCEDURE — 97112 NEUROMUSCULAR REEDUCATION: CPT | Performed by: PHYSICAL THERAPIST

## 2019-11-12 PROCEDURE — 92507 TX SP LANG VOICE COMM INDIV: CPT | Performed by: NURSE PRACTITIONER

## 2019-11-12 PROCEDURE — 97112 NEUROMUSCULAR REEDUCATION: CPT

## 2019-11-12 NOTE — PROGRESS NOTES
Speech-Language Pathology Treatment Note    Today's date: 2019  Patient name: Mally Brown  : 1975  MRN: [id number]  Referring provider: Zachary Christensen MD  Dx:   Encounter Diagnosis     ICD-10-CM    1  Intraparenchymal hemorrhage of brain (HCC) I61 9    2  Dysphagia, oral phase R13 11    3  Dysarthria R47 1      Medical History significant for:   Past Medical History:   Diagnosis Date    Depression     Hypertension     Migraine     Stroke ([facility] [address] )      Flowsheet:  Start Time: 1100  Stop Time: 1200  Total time in clinic (min): 60 minutes    Subjective: Pt reported that he is feeling much better again today, he attended after PT/OT  He continues to tolerate all three services well in one day  Objective:  1  Pt will produce short passages (5-8 sentence in length) with increased speaking rate during NMES with min cues to increase speed  NMES 5 0 facial placement manipulatives comfortable <5  Min )  Naming abstract pictures 25 words 1 min  : NMES 4 0 facial placement reading 5-8 sentence paragraphs and answering comprehension questions  Pt reporting increased sensations on left side today with NMES in place  2  Pt will complete OME at home 10x each 3x a day to increase ROM and strength of oral structures chelle  lip trills unsuccessful today  Able to sustain intraoral pressure, transferring it is still difficult on the left, able to transfer 5 times succesfully without air loss   pt can do lip trials but unable to sustain for beyond 1-2 seconds  3  Pt will complete IOPI assessment to obtain a baseline measure Goal met see note from      4  Pt will chelle utilize swallowing strategy, lingual sweep, when consuming solids to eliminate left buccal pocketing in all instances with NMES in place   pt denies any difficulty with oral residue since last session  4 oz of thin liquids via single controlled cup sips @ 100% chelle   4 oz thin liquids via single controlled straw sips with increased delayed swallow  Change at home with meals cup only and can use straw with liquids only and not during a meal  11/12 chewing starbursts without difficulty reported at home  Swallowing still "doesn't feel normal"  Still hiccups while eating  Pt will start swallowing exercises today, taught and provided with copy  5  NEW  Patient will improve left lip strength to 80% max (13) 10x across 3 sets with 3 second holds  11/7: Completed with moderate cues initially and decreased to minimal cues over time  It was very effortful for the pt to control bulb with lips and therefore he required to hold it in with his fingers  11/12 reporting "it's tough" needed cue to hold for full 3 seconds  Needed to replace in correct spot with hands after each repetition as unable to hold in same position with mouth only  No breaks needed in between sets of 10, successful at 30 repetitions  5  NEW  Pt will improve attention during divided attention tasks with decreased timing and cueing over time 11/7: Pt verbally answering comprehension questions asked by the therapist after reading a paragraph with distraction (e g  Music) chelle @81% accy  Pt sorting Blink cards by shape/number with music distraction  Trial 1- 1 error in 3:50, Trial 2- 2 errors 3:50  Verbal labeling Blink cards by shape/number with music distraction  Trial 1- 5 errors in 2:35, Trial 2- 5 errors in 2:25, Trial 3- 2 errors in 2:20  11/12 scattegories (verbal naming) while sitting in chair with music on named 11 items w/ list 2 letter /s/<2 min, list 1 letter / i/ 6 items 4 min  Balancing on foam mat mental maipulation 4 words bwd 100%, 5 words related bwd 50% chelle ( occasional transposition) ,5#'s low to high 100% chelle and 6 #'s low to high @ 3/4 trials chelle  Assessment: Pt performed well during tasks  Pt feels that his he is performing well at home on divided attention tasks   He was aware of the difficulty he had out in the gym last week ( doing exercises while talking to someone) once it was brought to his attention  He has denied any difficulty since  No mental fatigue or symptoms reported at end of session        Plan:  Patients would benefit from: Speech/ language therapy and Dysphagia therapy with NMES   Frequency:1-2x weekly   Duration:4-5 weeks   Pt's vocal quality will be monitored    Intervention certification from: 48/84/9944  Intervention certification to: 81/09/4295  Intervention Comments: Visit #4    Homework: 11/5 start oral motor exercises 11/12 swallowing exericses    Initial evaluation 10/31/19

## 2019-11-12 NOTE — PROGRESS NOTES
Daily Note     Today's date: 2019  Patient name: Lara Quevedo  : 1975  MRN: [id number]  Referring provider: Hunter Hoyos MD  Dx:   Encounter Diagnosis     ICD-10-CM    1  Intraparenchymal hemorrhage of brain (HCC) I61 9                   Subjective: Patient reported one medication was stopped which has increased his BP but decreased fatigue patient was feeling  Patient reported that his biggest difficulty currently in using his LUE and commented that the left arm/hand "is dead to me "      Objective: See treatment diary below      Assessment: Tolerated treatment well  Patient demonstrated fatigue post treatment and would benefit from continued PT intervention to improve balance, coordination and activity tolerance to facilitate return to PLOF  Plan: Continue per plan of care  Patient treated by DANY Knox under direct PT supervision  Precautions: Falls  Re-eval Date: 19    Date 10/29/19 11/5 11/7 11/12    Visit Count 1 2 3 4    FOTO completed       Pain In See IE   0    Pain Out See IE   0      Manual  upcoming         Exercise Diary  140s/90s with activity   120/72 120/90 160/90  180/85 after elliptical  140/95      Aerobic Elliptical L1  10 mins Nustep  L3 10 min Nustep   L3 15 min Elliptical L1  5 mins     SLR x 3 Stand   Foam   2x10 ea  1 HHA      HR/TR Stand  Foam   30x ea  2 HHA/finger tips      Mini Squats Stand  Foam   -->              Natus   20 mins              ADL Suite        Hip strength   Tall kneeling while pressing 2 kg ball overheard, UE rows w/ green TB, tricep ext with green TB, 10 ea  exercise    Half kneeling while perf D1 w/ green theraband, 2 kg ball diagonal x 10 ea       Core strength   Plank with alt UE rotation x 10     Quad alt UE/LE ext 2 x 10     Outdoors/obstacle course        Dynavision    On foam narrow RADHA, tandem stance, R foot on box for forced LLE   20 mins    Balance / Coordination    SLS while tossing and receiving weighted ball from trampoline    Single limb sequence stepping activity on 8 inch box  20 mins        Modalities          prn

## 2019-11-12 NOTE — PROGRESS NOTES
Daily Note     Today's date: 2019  Patient name: Lara Quevedo  : 1975  MRN: [id number]  Referring provider: Hunter Hoyos MD  Dx:   Encounter Diagnosis     ICD-10-CM    1  Nontraumatic intracerebral hemorrhage, unspecified cerebral location, unspecified laterality ([facility] [address] ) I61 9    2  Intraparenchymal hemorrhage of brain (HCC) I61 9                   Subjective: I'm trying to use my left hand more  Objective:   Manual  10/31/19 11/5/19                                                                                     Exercise Diary  10/31/19 11/5/19  11/7/19  11/12/19     T-putty  Without objects     With objects Red HEP   10x  Pinch 5x      Red  10 pennies  3 beans  3 buttons     Digiflex    indiv digits   Green   30  difficulty Green  30   30 Green   30  30     Tension pins      all on and off  all on and off     FMC  Grooved pegs  Rice w/ objects  Beans w/ tweezers  Nuts and bolts         3 rows 2:27           1x each size (4)  All in and out  4 min 10 sec  attempted  2 min 50 sec      13 beans     Trampoline w/ medicine ball  Level 5 green ball 40x using both hands, right hand only   left hand only           Sensory sticks  Palm/dorsal left hand             UBC   5 min  6 min  8 min     Body blade             Catching ball             T-band   Row   Ext   shld FF  ABD  ER   Red  10  10                     dynavision board      A 36/1 67  42/1 43  56/1 07  55/1 07                       beans off table thumb->IF,MF,RF,SF      x30       MRMT place one row         R 11 sec  L 19 sec  17 sec     MRMT turn 1 row        R 16 sec  L 26                                                                                                Modalities                                                               Assessment: Tolerated treatment well  Patient demonstrated fatigue post treatment and would benefit from continued OT  Pt demonstrated increase FM coordination   Able to use tweezers to pick up beans vs unable to do last week, also decreased time on grooved peg board  Plan: Continue per plan of care  Progress treatment as tolerated

## 2019-11-14 ENCOUNTER — OFFICE VISIT (OUTPATIENT)
Dept: PHYSICAL THERAPY | Facility: CLINIC | Age: 44
End: 2019-11-14
Payer: COMMERCIAL

## 2019-11-14 ENCOUNTER — OFFICE VISIT (OUTPATIENT)
Dept: SPEECH THERAPY | Facility: CLINIC | Age: 44
End: 2019-11-14
Payer: COMMERCIAL

## 2019-11-14 ENCOUNTER — OFFICE VISIT (OUTPATIENT)
Dept: OCCUPATIONAL THERAPY | Facility: CLINIC | Age: 44
End: 2019-11-14
Payer: COMMERCIAL

## 2019-11-14 DIAGNOSIS — I61.9 NONTRAUMATIC INTRACEREBRAL HEMORRHAGE, UNSPECIFIED CEREBRAL LOCATION, UNSPECIFIED LATERALITY (HCC): Primary | ICD-10-CM

## 2019-11-14 DIAGNOSIS — R47.1 DYSARTHRIA: ICD-10-CM

## 2019-11-14 DIAGNOSIS — I61.9 INTRAPARENCHYMAL HEMORRHAGE OF BRAIN (HCC): Primary | ICD-10-CM

## 2019-11-14 DIAGNOSIS — I61.9 INTRAPARENCHYMAL HEMORRHAGE OF BRAIN (HCC): ICD-10-CM

## 2019-11-14 DIAGNOSIS — R13.11 DYSPHAGIA, ORAL PHASE: Primary | ICD-10-CM

## 2019-11-14 PROCEDURE — 97112 NEUROMUSCULAR REEDUCATION: CPT

## 2019-11-14 PROCEDURE — 92507 TX SP LANG VOICE COMM INDIV: CPT | Performed by: NURSE PRACTITIONER

## 2019-11-14 PROCEDURE — 97110 THERAPEUTIC EXERCISES: CPT | Performed by: PHYSICAL THERAPIST

## 2019-11-14 PROCEDURE — 97112 NEUROMUSCULAR REEDUCATION: CPT | Performed by: PHYSICAL THERAPIST

## 2019-11-14 NOTE — PROGRESS NOTES
Daily Note     Today's date: 2019  Patient name: Alva Nichols  : 1975  MRN: [id number]  Referring provider: Oswaldo Lovett MD  Dx:   Encounter Diagnosis     ICD-10-CM    1  Nontraumatic intracerebral hemorrhage, unspecified cerebral location, unspecified laterality ([facility] ) I61 9    2  Intraparenchymal hemorrhage of brain ([facility] ) I61 9                   Subjective:       Objective:   Manual  10/31/19 11/5/19                                                                                     Exercise Diary  10/31/19 11/5/19  11/7/19  11/12/19  11/14/19   T-putty  Without objects     With objects Red HEP   10x  Pinch 5x      Red  10 pennies  3 beans  3 buttons     Digiflex    indiv digits   Green   30  difficulty Green  30   30 Green   30  30  Green  30  30   Tension pins      all on and off  all on and off     FMC  Grooved pegs  Rice w/ objects  Beans w/ tweezers  Nuts and bolts         3 rows 2:27           1x each size (4)  All in and out  4 min 10 sec  attempted  2 min 50 sec        13 beans Purdue pegboard 1 row  L 25 beans  R 56 beans  2 min timed   Trampoline w/ medicine ball  Level 5 green ball 40x using both hands, right hand only   left hand only           Sensory sticks  Palm/dorsal left hand             UBC   5 min  6 min  8 min     Body blade             Catching ball             T-band   Row   Ext   shld FF  ABD  ER   Red  10  10                     dynavision board      A 36/1 67  42/1 43  56/1 07  55/1 07        Various nuts and bolts           assembled and disassembled     beans off table thumb->IF,MF,RF,SF      x30       MRMT place one row         R 11 sec  L 19 sec  17 sec     MRMT turn 1 row        R 16 sec  L 26     lacing beads          40 beads on/off    brain game         30 min                                                    Assessment: Tolerated treatment fair   Patient demonstrated mod difficulty with brain game, primarily with word finding and remembering to alternate hands  Plan: Continue per plan of care  Progress treatment as tolerated

## 2019-11-14 NOTE — PROGRESS NOTES
Daily Note     Today's date: 2019  Patient name: Jessica Fajardo  : 1975  MRN: [id number]  Referring provider: Josefa Yates MD  Dx:   Encounter Diagnosis     ICD-10-CM    1  Intraparenchymal hemorrhage of brain (HCC) I61 9                   Subjective: Patient denies any headaches or dizziness  Patient has been monitoring his BP at home and reports that it is consistently within the range recommended by the doctor  Patient reports that his biggest limitation is lack of awareness of LUE  Objective: See treatment diary below      Assessment: Tolerated treatment well  Patient demonstrated fatigue post treatment  Patient continues to struggle with dual task activities and often is unable to maintain focus on cognitive task  Pt would benefit from continued PT intervention to improve balance and coordination during dual task activities for increased safety when ambulating in community  Plan: Continue per plan of care  Patient treated by DANY Brandt under direct PT supervision  Precautions: Falls  Re-eval Date: 19    Date 10/29/19 11/5 11/7 11/12 11/14   Visit Count 1 2 3 4 5   FOTO completed       Pain In See IE   0 0   Pain Out See IE   0 0     Manual  upcoming         Exercise Diary  140s/90s with activity   120/72 120/90 160/90  180/85 after elliptical  140/95   175/85 after ellipitical   140/90   Aerobic Elliptical L1  10 mins Nustep  L3 10 min Nustep   L3 15 min Elliptical L1  5 mins  Elliptical L1 10 mins    SLR x 3 Stand   Foam   2x10 ea  1 HHA   Stand   Foam  1 HHA  2x10 ea  3lb ankle weight   HR/TR Stand  Foam   30x ea  2 HHA/finger tips      Mini Squats Stand  Foam   -->              Natus   20 mins              ADL Suite        Hip strength   Tall kneeling while pressing 2 kg ball overheard, UE rows w/ green TB, tricep ext with green TB, 10 ea  exercise    Half kneeling while perf D1 w/ green theraband, 2 kg ball diagonal x 10 ea       Core strength   Plank with alt UE rotation x 10     Quad alt UE/LE ext 2 x 10     Outdoors/obstacle course        Dynavision    On foam narrow RADHA, tandem stance, R foot on box for forced LLE   20 mins Stand  Foam  Semi tandem  Cognitive tasks: naming objects, green light L hand, red light R hand  Reaching across body   15 mins    Balance / Coordination    SLS while tossing and receiving weighted ball from [email or web address]    Single limb sequence stepping activity on 8 inch box  20 mins Quadruped and stand on foam  Blink cards for cog task of naming exercise & multiplication of number for reps  Squats, alt UE/LE, push ups   15 mins        Modalities          prn

## 2019-11-14 NOTE — PROGRESS NOTES
Speech-Language Pathology Treatment Note    Today's date: 2019  Patient name: Wing Hall  : 1975  MRN: [id number]  Referring provider: Brady Bajwa MD  Dx:   No diagnosis found  Medical History significant for:   Past Medical History:   Diagnosis Date    Depression     Hypertension     Migraine     Stroke ([facility] [address] )      Flowsheet:  Start Time: 1100  Stop Time: 1200  Total time in clinic (min): 60 minutes    Subjective: Pt attended speech therapy after PT/OT today  Pt appeared to tolerate all three therapies well today and had great participation  Objective:  1  Pt will produce short passages (5-8 sentence in length) with increased speaking rate during NMES with min cues to increase speed  NMES 5 0 facial placement manipulatives comfortable <5  Min )  Naming abstract pictures 25 words 1 min  : NMES 4 0 facial placement reading 5-8 sentence paragraphs and answering comprehension questions  Pt reporting increased sensations on left side today with NMES in place  : NMES 4 0 Right side and 5 0 Left side facial placement reading 5-8 sentence paragraphs and answering compression questions  Pt initial reporting he felt his L eye twitching at 5 0, but there was no visual eye movement  Later on, pt noted he could barely feel the 5 0, however, after it was increased to 5 5, pt's eye was visibly twitching, so it was decreased back down to 5 0     2  Pt will complete OME at home 10x each 3x a day to increase ROM and strength of oral structures chelle  lip trills unsuccessful today  Able to sustain intraoral pressure, transferring it is still difficult on the left, able to transfer 5 times succesfully without air loss   pt can do lip trials but unable to sustain for beyond 1-2 seconds       3  Pt will complete IOPI assessment to obtain a baseline measure Goal met see note from      4  Pt will chelle utilize swallowing strategy, lingual sweep, when consuming solids to eliminate left buccal pocketing in all instances with NMES in place  11/5 pt denies any difficulty with oral residue since last session  4 oz of thin liquids via single controlled cup sips @ 100% chelle  4 oz thin liquids via single controlled straw sips with increased delayed swallow  Change at home with meals cup only and can use straw with liquids only and not during a meal  11/12 chewing starbursts without difficulty reported at home  Swallowing still "doesn't feel normal"  Still hiccups while eating  Pt will start swallowing exercises today, taught and provided with copy  5  NEW  Patient will improve left lip strength to 80% max (13) 10x across 3 sets with 3 second holds  11/7: Completed with moderate cues initially and decreased to minimal cues over time  It was very effortful for the pt to control bulb with lips and therefore he required to hold it in with his fingers  11/12 reporting "it's tough" needed cue to hold for full 3 seconds  Needed to replace in correct spot with hands after each repetition as unable to hold in same position with mouth only  No breaks needed in between sets of 10, successful at 30 repetitions  11/14 no breaks needed better control but still felt "effortful"  Next week increase threshold  5  NEW  Pt will improve attention during divided attention tasks with decreased timing and cueing over time 11/7: Pt verbally answering comprehension questions asked by the therapist after reading a paragraph with distraction (e g  Music) chelle @81% accy  Pt sorting Blink cards by shape/number with music distraction  Trial 1- 1 error in 3:50, Trial 2- 2 errors 3:50  Verbal labeling Blink cards by shape/number with music distraction  Trial 1- 5 errors in 2:35, Trial 2- 5 errors in 2:25, Trial 3- 2 errors in 2:20  11/12 scattegories (verbal naming) while sitting in chair with music on named 11 items w/ list 2 letter /s/<2 min, list 1 letter / i/ 6 items 4 min   Balancing on foam mat mental maipulation 4 words bwd 100%, 5 words related bwd 50% chelle ( occasional transposition) ,5#'s low to high 100% chelle and 6 #'s low to high @ 3/4 trials chelle  11/14: Balance on black foam mat during NMES 5 words bwd chelle @30% increasing to 90% with transposition, 4 words ABC chelle @70%  Balance on blue beam mat during NMES 6 #s low to high chelle @70%, 6 #s high to low chelle @80%    Assessment: Pt was very compliant and worked hard through activities despite being his third therapy of the day  During manipulation tasks, pt noted that balancing on the beam along with having the NMES facial placement on was very distracting for him         Plan:  Patients would benefit from: Speech/ language therapy and Dysphagia therapy with NMES   Frequency:1-2x weekly   Duration:4-5 weeks   Pt's vocal quality will be monitored    Intervention certification from: 78/56/6807  Intervention certification to: 32/53/8413  Intervention Comments: Visit #5    Homework: 11/5 start oral motor exercises 11/12 swallowing exericses    Initial evaluation 10/31/19

## 2019-11-19 ENCOUNTER — OFFICE VISIT (OUTPATIENT)
Dept: OCCUPATIONAL THERAPY | Facility: CLINIC | Age: 44
End: 2019-11-19
Payer: COMMERCIAL

## 2019-11-19 ENCOUNTER — TELEPHONE (OUTPATIENT)
Dept: NEUROSURGERY | Facility: CLINIC | Age: 44
End: 2019-11-19

## 2019-11-19 ENCOUNTER — OFFICE VISIT (OUTPATIENT)
Dept: PHYSICAL THERAPY | Facility: CLINIC | Age: 44
End: 2019-11-19
Payer: COMMERCIAL

## 2019-11-19 ENCOUNTER — OFFICE VISIT (OUTPATIENT)
Dept: SPEECH THERAPY | Facility: CLINIC | Age: 44
End: 2019-11-19
Payer: COMMERCIAL

## 2019-11-19 DIAGNOSIS — R47.1 DYSARTHRIA: Primary | ICD-10-CM

## 2019-11-19 DIAGNOSIS — I61.9 INTRAPARENCHYMAL HEMORRHAGE OF BRAIN (HCC): Primary | ICD-10-CM

## 2019-11-19 DIAGNOSIS — I61.9 NONTRAUMATIC INTRACEREBRAL HEMORRHAGE, UNSPECIFIED CEREBRAL LOCATION, UNSPECIFIED LATERALITY (HCC): Primary | ICD-10-CM

## 2019-11-19 PROCEDURE — 97112 NEUROMUSCULAR REEDUCATION: CPT

## 2019-11-19 PROCEDURE — 97110 THERAPEUTIC EXERCISES: CPT | Performed by: PHYSICAL THERAPIST

## 2019-11-19 PROCEDURE — 97110 THERAPEUTIC EXERCISES: CPT

## 2019-11-19 PROCEDURE — 92507 TX SP LANG VOICE COMM INDIV: CPT

## 2019-11-19 PROCEDURE — 97112 NEUROMUSCULAR REEDUCATION: CPT | Performed by: PHYSICAL THERAPIST

## 2019-11-19 NOTE — TELEPHONE ENCOUNTER
Received a call from Tabitha Matthew requesting if it is ok to have CTA completed around 1/5/2019 - she states that insurance authorization only covers the imaging until then and would like to avoid having to resubmit auth  Discussed with  to determine if reauth would be needed or if we could just change the date  Will discuss if getting CTA 1 month early will be acceptable and contact her back to advise

## 2019-11-19 NOTE — TELEPHONE ENCOUNTER
Spoke with Dr Cindy Fajardo about moving up the CTA  He said ok to have before 1/5/2020 but should be as close to that date as possible  Jacki Parker of this, she will call central and reschedule to a later date

## 2019-11-19 NOTE — PROGRESS NOTES
Daily Note     Today's date: 2019  Patient name: Carlene Patel  : 1975  MRN: [id number]  Referring provider: Krishna Reynolds MD  Dx:   Encounter Diagnosis     ICD-10-CM    1  Intraparenchymal hemorrhage of brain (HCC) I61 9                   Subjective: Patient reports he is feeling confident during mobility and isn't worried about the L leg buckling on him  He has returned to walking outdoors and does not feel limited functionally due to his LEs  His biggest complaint is the lack of awareness in his L arm / hand  Objective: See treatment diary below      Assessment: Tolerated treatment well  Patient demonstrated fatigue post treatment  Patient showed improvements in dynamic standing balance compared to previous sessions  Patient showed improved activity tolerance as he was completed entire session with minimal rest after speech and OT earlier  Plan: Continue per plan of care  Patient treated by DANY Mayers under direct PT supervision       Precautions: Falls  Re-eval Date: 19    Date        Visit Count 5 6       FOTO         Pain In 0 0       Pain Out 0 0         Manual  upcoming          Exercise Diary  175/85 after ellipitical   140/90        Aerobic Elliptical L1 10 mins  SRB   15 mins       SLR x 3 Stand   Foam  1 HHA  2x10 ea  3lb ankle weight        HR/TR         Mini Squats                  Natus                  ADL Suite         Hip strength  Rosaura  30 lbs backward walking x 10  15 lbs Sidestep x10 ea        Core strength  Antitrunk rotation (8lbs)   Firm  Foam  x10 ea       Outdoors/obstacle course  Side shuffle, forward/backward shuffle around cones       Dynavision Stand  Foam  Semi tandem  Cognitive tasks: naming objects, green light L hand, red light R hand  Reaching across body   15 mins         Balance / Coordination Quadruped and stand on foam  Blink cards for cog task of naming exercise & multiplication of number for reps  Squats, alt UE/LE, push ups   15 mins  Forward and side walking on treadmill (0 7-1 2 speed) while turning head and adding numbers           Modalities           prn def

## 2019-11-19 NOTE — PROGRESS NOTES
Daily Note     Today's date: 2019  Patient name: Nancy Fernandez  : 1975  MRN: [id number]  Referring provider: Gita Robledo MD  Dx:   Encounter Diagnosis     ICD-10-CM    1  Nontraumatic intracerebral hemorrhage, unspecified cerebral location, unspecified laterality ([facility] [address] ) I61 9                   Subjective: I have a hard time remembering what I am doing        Objective:   Manual   19                                                                                     Exercise Diary  19   T-putty  Without objects     With objects       Red  10 pennies  3 beans  3 buttons     Digiflex    indiv digits  Green  30  30 Green   30  difficulty Green  30   30 Green   30  30  Green  30  30   Tension pins      all on and off  all on and off     FMC  Grooved pegs  Rice w/ objects  Beans w/ tweezers  Nuts and bolts     3min 18 secs    3 rows 2:27           1x each size (4)  All in and out  4 min 10 sec  attempted  2 min 50 sec        13 beans Purdue pegboard 1 row  L 25 beans  R 56 beans  2 min timed   Trampoline w/ medicine ball  Level 5 green ball 5 min using both hands, right hand only   left hand only  Stand on 1 leg doing scategories           Sensory sticks  Palm/dorsal left hand             UBC   5 min  6 min  8 min     Body blade             Catching ball             T-band   Row   Ext   shld FF  ABD  ER   Red  10  10                     dynavision board  A 57/1 05  Left 55/1 09  Math 51/51 1 16 miss 2  46/47 1 21 miss1  Word 59/1 02      A 36/1 67  42/1 43  56/1 07  55/1 07        Various nuts and bolts           assembled and disassembled     beans off table thumb->IF,MF,RF,SF      x30       MRMT place one row         R 11 sec  L 19 sec  17 sec     MRMT turn 1 row        R 16 sec  L 26     lacing beads          40 beads on/off    brain game  30 min       30 min    flex bar  Wrist flex and ext  Sup/pro  Green  15/15  15/15                                                Assessment: Tolerated treatment well  Patient demonstrated min-mod difficulty with divided attention task, difficulty with word finding  Plan: Continue per plan of care  Progress treatment as tolerated

## 2019-11-19 NOTE — PROGRESS NOTES
Speech-Language Pathology Treatment Note    Today's date: 2019  Patient name: Jovita Lora  : 1975  MRN: [id number]  Referring provider: Amee Duncan MD  Dx:   Encounter Diagnosis     ICD-10-CM    1  Dysarthria R47 1      Medical History significant for:   Past Medical History:   Diagnosis Date    Depression     Hypertension     Migraine     Stroke ([address] )      Flowsheet:  Start Time: 0900  Stop Time: 1000  Total time in clinic (min): 60 minutes    Subjective: Pt attending speech therapy first today in a string of three therapies  Objective:  1  Pt will produce short passages (5-8 sentence in length) with increased speaking rate during NMES with min cues to increase speed  NMES 5 0 facial placement manipulatives comfortable <5  Min )  Naming abstract pictures 25 words 1 min  : NMES 4 0 facial placement reading 5-8 sentence paragraphs and answering comprehension questions  Pt reporting increased sensations on left side today with NMES in place  : NMES 4 0 Right side and 5 0 Left side facial placement reading 5-8 sentence paragraphs and answering compression questions  Pt initial reporting he felt his L eye twitching at 5 0, but there was no visual eye movement  Later on, pt noted he could barely feel the 5 0, however, after it was increased to 5 5, pt's eye was visibly twitching, so it was decreased back down to 5 0   :  Pt at 5 0 on both sides, pt with visible R eye twitching toward end of session  2  Pt will complete OME at home 10x each 3x a day to increase ROM and strength of oral structures chelle  lip trills unsuccessful today  Able to sustain intraoral pressure, transferring it is still difficult on the left, able to transfer 5 times succesfully without air loss   pt can do lip trials but unable to sustain for beyond 1-2 seconds       3  Pt will complete IOPI assessment to obtain a baseline measure Goal met see note from      4  Pt will chelle utilize swallowing strategy, lingual sweep, when consuming solids to eliminate left buccal pocketing in all instances with NMES in place  11/5 pt denies any difficulty with oral residue since last session  4 oz of thin liquids via single controlled cup sips @ 100% chelle  4 oz thin liquids via single controlled straw sips with increased delayed swallow  Change at home with meals cup only and can use straw with liquids only and not during a meal  11/12 chewing starbursts without difficulty reported at home  Swallowing still "doesn't feel normal"  Still hiccups while eating  Pt will start swallowing exercises today, taught and provided with copy  5   Patient will improve left lip strength to 80% max (13) 10x across 3 sets with 3 second holds  11/7: Completed with moderate cues initially and decreased to minimal cues over time  It was very effortful for the pt to control bulb with lips and therefore he required to hold it in with his fingers  11/12 reporting "it's tough" needed cue to hold for full 3 seconds  Needed to replace in correct spot with hands after each repetition as unable to hold in same position with mouth only  No breaks needed in between sets of 10, successful at 30 repetitions  11/14 no breaks needed better control but still felt "effortful"  Next week increase threshold  6   Pt will improve attention during divided attention tasks with decreased timing and cueing over time 11/7: Pt verbally answering comprehension questions asked by the therapist after reading a paragraph with distraction (e g  Music) chelle @81% accy  Pt sorting Blink cards by shape/number with music distraction  Trial 1- 1 error in 3:50, Trial 2- 2 errors 3:50  Verbal labeling Blink cards by shape/number with music distraction   Trial 1- 5 errors in 2:35, Trial 2- 5 errors in 2:25, Trial 3- 2 errors in 2:20  11/12 scattegories (verbal naming) while sitting in chair with music on named 11 items w/ list 2 letter /s/<2 min, list 1 letter / i/ 6 items 4 min  Balancing on foam mat mental maipulation 4 words bwd 100%, 5 words related bwd 50% chelle ( occasional transposition) ,5#'s low to high 100% chelle and 6 #'s low to high @ 3/4 trials chelle  11/14: Balance on black foam mat during NMES 5 words bwd chelle @30% increasing to 90% with transposition, 4 words ABC chelle @70%  Balance on blue beam mat during NMES 6 #s low to high chelle @70%, 6 #s high to low chelle @ 80%    Date Task type Task Average  SRO Early  Late  Variance    11/19 Baseline  "  (+) visual and VS  "  "  (+) guide  "  "  "  " 69  77  46  60  45  71  70  59  41  46 6  10  21  13  24  19  16  17  29  18 85  84  78  90  79  83  79  78  61  75 15  16  22  10  21  17  21  22  39  25 60  63  46  42  40  64  76  61  49  48                                                             Assessment: Pt did very well in therapy today  He tolerated IM training with high accuracy and reports that he's optimistic it will help with his drumming timing going forward        Plan:  Patients would benefit from: Speech/ language therapy and Dysphagia therapy with NMES   Frequency:1-2x weekly   Duration:4-5 weeks   Pt's vocal quality will be monitored    Intervention certification from: 08/83/0126  Intervention certification to: 05/67/4186  Intervention Comments: Visit #5    Homework: 11/5 start oral motor exercises 11/12 swallowing exericses    Initial evaluation 10/31/19

## 2019-11-21 ENCOUNTER — TELEPHONE (OUTPATIENT)
Dept: NEUROLOGY | Facility: CLINIC | Age: 44
End: 2019-11-21

## 2019-11-21 ENCOUNTER — OFFICE VISIT (OUTPATIENT)
Dept: OCCUPATIONAL THERAPY | Facility: CLINIC | Age: 44
End: 2019-11-21
Payer: COMMERCIAL

## 2019-11-21 ENCOUNTER — OFFICE VISIT (OUTPATIENT)
Dept: SPEECH THERAPY | Facility: CLINIC | Age: 44
End: 2019-11-21
Payer: COMMERCIAL

## 2019-11-21 DIAGNOSIS — I61.9 INTRAPARENCHYMAL HEMORRHAGE OF BRAIN (HCC): ICD-10-CM

## 2019-11-21 DIAGNOSIS — R47.1 DYSARTHRIA: Primary | ICD-10-CM

## 2019-11-21 DIAGNOSIS — I61.9 NONTRAUMATIC INTRACEREBRAL HEMORRHAGE, UNSPECIFIED CEREBRAL LOCATION, UNSPECIFIED LATERALITY (HCC): Primary | ICD-10-CM

## 2019-11-21 DIAGNOSIS — R13.11 DYSPHAGIA, ORAL PHASE: ICD-10-CM

## 2019-11-21 PROCEDURE — 92507 TX SP LANG VOICE COMM INDIV: CPT | Performed by: NURSE PRACTITIONER

## 2019-11-21 PROCEDURE — 97112 NEUROMUSCULAR REEDUCATION: CPT

## 2019-11-21 PROCEDURE — 97110 THERAPEUTIC EXERCISES: CPT

## 2019-11-21 PROCEDURE — 97530 THERAPEUTIC ACTIVITIES: CPT

## 2019-11-21 NOTE — PROGRESS NOTES
Speech-Language Pathology Treatment Note    Today's date: 2019  Patient name: Yanick Hyde  : 1975  MRN: [id number]  Referring provider: Allyn Posey MD  Dx:   Encounter Diagnosis     ICD-10-CM    1  Dysarthria R47 1    2  Dysphagia, oral phase R13 11    3  Intraparenchymal hemorrhage of brain (HCC) I61 9      Medical History significant for:   Past Medical History:   Diagnosis Date    Depression     Hypertension     Migraine     St[address] ([facility] [address] )      Flowsheet:  Start Time: 1100  Stop Time: 1200  Total time in clinic (min): 60 minutes    Subjective: Pt attending speech therapy following PT/OT today  Objective:  1  Pt will produce short passages (5-8 sentence in length) with increased speaking rate during NMES with min cues to increase speed  NMES 5 0 facial placement manipulatives comfortable <5  Min )  Naming abstract pictures 25 words 1 min  : NMES 4 0 facial placement reading 5-8 sentence paragraphs and answering comprehension questions  Pt reporting increased sensations on left side today with NMES in place  : NMES 4 0 Right side and 5 0 Left side facial placement reading 5-8 sentence paragraphs and answering compression questions  Pt initial reporting he felt his L eye twitching at 5 0, but there was no visual eye movement  Later on, pt noted he could barely feel the 5 0, however, after it was increased to 5 5, pt's eye was visibly twitching, so it was decreased back down to 5 0   :  Pt at 5 0 on both sides, pt with visible R eye twitching toward end of session  2  Pt will complete OME at home 10x each 3x a day to increase ROM and strength of oral structures chelle  lip trills unsuccessful today  Able to sustain intraoral pressure, transferring it is still difficult on the left, able to transfer 5 times succesfully without air loss   pt can do lip trials but unable to sustain for beyond 1-2 seconds       3  Pt will complete IOPI assessment to obtain a baseline measure Goal met see note from 11/5     4  Pt will chelle utilize swallowing strategy, lingual sweep, when consuming solids to eliminate left buccal pocketing in all instances with NMES in place  11/5 pt denies any difficulty with oral residue since last session  4 oz of thin liquids via single controlled cup sips @ 100% chelle  4 oz thin liquids via single controlled straw sips with increased delayed swallow  Change at home with meals cup only and can use straw with liquids only and not during a meal  11/12 chewing starbursts without difficulty reported at home  Swallowing still "doesn't feel normal"  Still hiccups while eating  Pt will start swallowing exercises today, taught and provided with copy  5   Patient will improve left lip strength to 80% max (13) 10x across 3 sets with 3 second holds  11/7: Completed with moderate cues initially and decreased to minimal cues over time  It was very effortful for the pt to control bulb with lips and therefore he required to hold it in with his fingers  11/12 reporting "it's tough" needed cue to hold for full 3 seconds  Needed to replace in correct spot with hands after each repetition as unable to hold in same position with mouth only  No breaks needed in between sets of 10, successful at 30 repetitions  11/14 no breaks needed better control but still felt "effortful"  Next week increase threshold  11/21 target set at 15 completed 38 repetitions and only 1 break needed with 3 second holds  6   Pt will improve attention during divided attention tasks with decreased timing and cueing over time 11/7: Pt verbally answering comprehension questions asked by the therapist after reading a paragraph with distraction (e g  Music) chelle @81% accy  Pt sorting Blink cards by shape/number with music distraction  Trial 1- 1 error in 3:50, Trial 2- 2 errors 3:50  Verbal labeling Blink cards by shape/number with music distraction   Trial 1- 5 errors in 2:35, Trial 2- 5 errors in 2:25, Trial 3- 2 errors in 2:20  11/12 scattegories (verbal naming) while sitting in chair with music on named 11 items w/ list 2 letter /s/<2 min, list 1 letter / i/ 6 items 4 min  Balancing on foam mat mental maipulation 4 words bwd 100%, 5 words related bwd 50% chelle ( occasional transposition) ,5#'s low to high 100% chelle and 6 #'s low to high @ 3/4 trials chelle  11/14: Balance on black foam mat during NMES 5 words bwd chelle @30% increasing to 90% with transposition, 4 words ABC chelle @70%  Balance on blue beam mat during NMES 6 #s low to high chelle @70%, 6 #s high to low chelle @ 80% 11/21 door open rush hour level 1 <2 min, level 5 <1 min chelle, and level 10 solved 3 min chelle  Date Task type Task Average  SRO Early  Late  Variance    11/19 Baseline  "  (+) visual and VS  "  "  (+) guide  "  "  "  " 69  77  46  60  45  71  70  59  41  46 6  10  21  13  24  19  16  17  29  18 85  84  78  90  79  83  79  78  61  75 15  16  22  10  21  17  21  22  39  25 60  63  46  42  40  64  76  61  49  48   11/21  seated vis/guide 2 min VS  " abc standing  *RT sit aud  "  * sort cards by suit    38    57  88  63  52 27    12  11  21  19 59    56  89  75  66 41    44  11  25  34 47    71  70  60  61                                                    Assessment: Pt did very well in therapy today  Patient did well with oral motor exercises and cognitive tasks         Plan:  Patients would benefit from: Speech/ language therapy and Dysphagia therapy with NMES   Frequency:1-2x weekly   Duration:4-5 weeks   Pt's vocal quality will be monitored    Intervention certification from: 44/67/3253  Intervention certification to: 97/04/9677  Intervention Comments: Visit #6    Homework: 11/5 start oral motor exercises 11/12 swallowing exericses    Initial evaluation 10/31/19

## 2019-11-21 NOTE — TELEPHONE ENCOUNTER
21/30 day post discharge follow up call  Hospitalization 10/14-10/19, [address] through 10/24/2019  The purpose of this phone call is to assess patient's general wellbeing or for any assistance needed with follow-up care  Please see my telephone encounter for 7 day post discharge follow up call  -    Called patient with no answer  Reached voice prompt stating caller not available, no option to leave voicemail  Will follow up

## 2019-11-21 NOTE — PROGRESS NOTES
Daily Note     Today's date: 2019  Patient name: Felicia Chávez  : 1975  MRN: [id number]  Referring provider: Benjamin Spencer MD  Dx:   Encounter Diagnosis     ICD-10-CM    1  Nontraumatic intracerebral hemorrhage, unspecified cerebral location, unspecified laterality ([facility] [place] [address] ) I61 9    2   Intraparenchymal hemorrhage of brain (HCC) I61 9                   Subjective:       Objective:   Manual                                                                                         Exercise Diary  19   T-putty  Without objects     With objects        Red  10 pennies  3 beans  3 buttons     Digiflex    indiv digits  Green  30  30 Green   30  difficulty Green  30   30 Green   30  30  Green  30  30   Tension pins      all on and off  all on and off     FMC  Grooved pegs  Rice w/ objects  Beans w/ tweezers  Nuts and bolts     3min 18 secs      L 27 beans       All in and out  4 min 10 sec  attempted  2 min 50 sec        13 beans Purdue pegboard 1 row  L 25 beans  R 56 beans  2 min timed   Trampoline w/ medicine ball  Level 5 green ball 5 min using both hands, right hand only   left hand only  Stand on 1 leg doing scategories  10 min stand on balance master, alternate hands, also stood tandem on aerofoam, doing scategories          Sensory sticks  Palm/dorsal left hand             UBC   10 min  6 min  8 min     Body blade             Catching ball             T-band   Row   Ext   shld FF  ABD  ER                        dynavision board  A 57/1 05  Left 55/1 09  Math 51/51 1 16 miss 2  46/47 1 21 miss1  Word 59/1 02       A 36/1 67  42/1 43  56/1 07  55/1 07        Various nuts and bolts           assembled and disassembled     beans off table thumb->IF,MF,RF,SF    x50  x30       MRMT place one row         R 11 sec  L 19 sec  17 sec     MRMT turn 1 row        R 16 sec  L 26     lacing beads          40 beads on/off    brain game  30 min  35 min     30 min    flex bar  Wrist flex and ext  Sup/pro  Green  15/15  15/15  Nils  15/15    15/15                                              Assessment: Tolerated treatment fair  Patient demonstrated fatigue post treatment and would benefit from continued OT  Pt demonstrated mod-max difficulty with word finding and multitasking task  Instructed pt in strategies to work on word finding at home  Pt reports poor carryover of HEP at home  Plan: Continue per plan of care  Progress treatment as tolerated

## 2019-11-25 ENCOUNTER — OFFICE VISIT (OUTPATIENT)
Dept: SPEECH THERAPY | Facility: CLINIC | Age: 44
End: 2019-11-25
Payer: COMMERCIAL

## 2019-11-25 ENCOUNTER — OFFICE VISIT (OUTPATIENT)
Dept: OCCUPATIONAL THERAPY | Facility: CLINIC | Age: 44
End: 2019-11-25
Payer: COMMERCIAL

## 2019-11-25 DIAGNOSIS — I61.9 INTRAPARENCHYMAL HEMORRHAGE OF BRAIN (HCC): ICD-10-CM

## 2019-11-25 DIAGNOSIS — R47.1 DYSARTHRIA: Primary | ICD-10-CM

## 2019-11-25 DIAGNOSIS — I61.9 NONTRAUMATIC INTRACEREBRAL HEMORRHAGE, UNSPECIFIED CEREBRAL LOCATION, UNSPECIFIED LATERALITY (HCC): Primary | ICD-10-CM

## 2019-11-25 PROCEDURE — 97112 NEUROMUSCULAR REEDUCATION: CPT

## 2019-11-25 PROCEDURE — 97530 THERAPEUTIC ACTIVITIES: CPT

## 2019-11-25 PROCEDURE — 97110 THERAPEUTIC EXERCISES: CPT

## 2019-11-25 PROCEDURE — 92507 TX SP LANG VOICE COMM INDIV: CPT | Performed by: NURSE PRACTITIONER

## 2019-11-25 NOTE — PROGRESS NOTES
Daily Note     Today's date: 2019  Patient name: Earnest Munoz  : 1975  MRN: [id number]  Referring provider: Lance Bryant MD  Dx:   Encounter Diagnosis     ICD-10-CM    1  Nontraumatic intracerebral hemorrhage, unspecified cerebral location, unspecified laterality ([facility] [address] ) I61 9    2   Intraparenchymal hemorrhage of brain (HCC) I61 9                   Subjective:       Objective:   Manual                                                                                          Exercise Diary  19   T-putty  Without objects     With objects      Green  6 pennies  4 beans  1 button  Red  10 pennies  3 beans  3 buttons     Digiflex    indiv digits  Green  30  30 Green   30  difficulty Blue  30   30 Green   30  30  Green  30  30   Tension pins       all on and off     FMC  Grooved pegs  Rice w/ objects  Beans w/ tweezers  Nuts and bolts     3min 18 secs       L 27 beans         2 min 50 sec        13 beans Purdue pegboard 1 row  L 25 beans  R 56 beans  2 min timed   Trampoline w/ medicine ball  Level 5 green ball 5 min using both hands, right hand only   left hand only  Stand on 1 leg doing scategories  10 min stand on balance master, alternate hands, also stood tandem on aerofoam, doing scategories          Sensory sticks  Palm/dorsal left hand             UBC   10 min  10 min  8 min     Body blade             Catching ball             T-band   Row   Ext   shld FF  ABD  ER                         dynavision board  A 57/1 05  Left 55/1 09  Math 51/51 1 16 miss 2  46/47 1 21 miss1  Word 59/1 02              Various nuts and bolts           assembled and disassembled     beans off table thumb->IF,MF,RF,SF    x50        MRMT place one row         R 11 sec  L 19 sec  17 sec     MRMT turn 1 row        R 16 sec  L 26     lacing beads          40 beads on/off    brain game  30 min  35 min     30 min    flex bar  Wrist flex and ext  Sup/pro  Master mind  Nils  15/15  15/15  Green  15/15     15/15  Nils  20/20    20/20  20 min                                                 Assessment: Tolerated treatment well  Patient exhibited good technique with therapeutic exercises and would benefit from continued OT  Pt demonstrated good short term memory, able to rememeber directions to [employer]  Plan: Continue per plan of care  Progress treatment as tolerated

## 2019-11-25 NOTE — PROGRESS NOTES
Speech-Language Pathology Treatment Note    Today's date: 2019  Patient name: Carlene Patel  : 1975  MRN: [id number]  Referring provider: Krishna Reynolds MD  Dx:   Encounter Diagnosis     ICD-10-CM    1  Dysarthria R47 1      Medical History significant for:   Past Medical History:   Diagnosis Date    Depression     Hypertension     Migraine     Stroke ([address] )      Flowsheet:  Start Time: 1000  Stop Time: 1100  Total time in clinic (min): 60 minutes    Subjective: Patient reporting he went for a walk this weekend and went just over a mile  He intends to increase this distance as able  Patient continues with PT and OT therapies today  Objective:  1  Pt will produce short passages (5-8 sentence in length) with increased speaking rate during NMES with min cues to increase speed  NMES 5 0 facial placement manipulatives comfortable <5  Min )  Naming abstract pictures 25 words 1 min  : NMES 4 0 facial placement reading 5-8 sentence paragraphs and answering comprehension questions  Pt reporting increased sensations on left side today with NMES in place  : NMES 4 0 Right side and 5 0 Left side facial placement reading 5-8 sentence paragraphs and answering compression questions  Pt initial reporting he felt his L eye twitching at 5 0, but there was no visual eye movement  Later on, pt noted he could barely feel the 5 0, however, after it was increased to 5 5, pt's eye was visibly twitching, so it was decreased back down to 5 0   :  Pt at 5 0 on both sides, pt with visible R eye twitching toward end of session  :  4 0 on both sides for 47 minutes    2  Pt will complete OME at home 10x each 3x a day to increase ROM and strength of oral structures chelle  lip trills unsuccessful today  Able to sustain intraoral pressure, transferring it is still difficult on the left, able to transfer 5 times succesfully without air loss    pt can do lip trials but unable to sustain for beyond 1-2 seconds  3  Pt will complete IOPI assessment to obtain a baseline measure Goal met see note from 11/5     4  Pt will chelle utilize swallowing strategy, lingual sweep, when consuming solids to eliminate left buccal pocketing in all instances with NMES in place  11/5 pt denies any difficulty with oral residue since last session  4 oz of thin liquids via single controlled cup sips @ 100% chelle  4 oz thin liquids via single controlled straw sips with increased delayed swallow  Change at home with meals cup only and can use straw with liquids only and not during a meal  11/12 chewing starbursts without difficulty reported at home  Swallowing still "doesn't feel normal"  Still hiccups while eating  Pt will start swallowing exercises today, taught and provided with copy  5   Patient will improve left lip strength to 80% max (13) 10x across 3 sets with 3 second holds  11/7: Completed with moderate cues initially and decreased to minimal cues over time  It was very effortful for the pt to control bulb with lips and therefore he required to hold it in with his fingers  11/12 reporting "it's tough" needed cue to hold for full 3 seconds  Needed to replace in correct spot with hands after each repetition as unable to hold in same position with mouth only  No breaks needed in between sets of 10, successful at 30 repetitions  11/14 no breaks needed better control but still felt "effortful"  Next week increase threshold  11/21 target set at 15 completed 38 repetitions and only 1 break needed with 3 second holds  6   Pt will improve attention during divided attention tasks with decreased timing and cueing over time 11/7: Pt verbally answering comprehension questions asked by the therapist after reading a paragraph with distraction (e g  Music) chelle @81% accy  Pt sorting Blink cards by shape/number with music distraction  Trial 1- 1 error in 3:50, Trial 2- 2 errors 3:50   Verbal labeling Blink cards by shape/number with music distraction  Trial 1- 5 errors in 2:35, Trial 2- 5 errors in 2:25, Trial 3- 2 errors in 2:20  11/12 scattegories (verbal naming) while sitting in chair with music on named 11 items w/ list 2 letter /s/<2 min, list 1 letter / i/ 6 items 4 min  Balancing on foam mat mental maipulation 4 words bwd 100%, 5 words related bwd 50% chelle ( occasional transposition) ,5#'s low to high 100% chelle and 6 #'s low to high @ 3/4 trials chelle  11/14: Balance on black foam mat during NMES 5 words bwd chelle @30% increasing to 90% with transposition, 4 words ABC chelle @70%  Balance on blue beam mat during NMES 6 #s low to high chelle @70%, 6 #s high to low chelle @ 80% 11/21 door open rush hour level 1 <2 min, level 5 <1 min chelle, and level 10 solved 3 min chelle  Date Task type Task Average  SRO Early  Late  Variance    11/19 Baseline  "  (+) visual and VS  "  "  (+) guide  "  "  "  " 69  77  46  60  45  71  70  59  41  46 6  10  21  13  24  19  16  17  29  18 85  84  78  90  79  83  79  78  61  75 15  16  22  10  21  17  21  22  39  25 60  63  46  42  40  64  76  61  49  48   11/21  seated vis/guide 2 min VS  " abc standing  *RT sit aud  "  * sort cards by suit    38    57  88  63  52 27    12  11  21  19 59    56  89  75  66 41    44  11  25  34 47    71  70  60  61   11/25  vis/guide 2m  "  RT guide only  (+) colors *color blind  (+) shapes  "  (+) shapes & number alt 43  42  58  70  72  65  68 24  24  16  13  10  18  12 60  74  83  86  77  78  83 40  26  17  14  23  22  17 57  50  57  60  70  68  58                                           Assessment: Pt did very well in therapy today  Patient did well with oral motor exercises and cognitive tasks         Plan:  Patients would benefit from: Speech/ language therapy and Dysphagia therapy with NMES   Frequency:1-2x weekly   Duration:4-5 weeks   Pt's vocal quality will be monitored    Intervention certification from: 34/51/9233  Intervention certification to: 84/34/8426  Intervention Comments: Visit #6    Homework: 11/5 start oral motor exercises 11/12 swallowing exericses    Initial evaluation 10/31/19

## 2019-11-25 NOTE — TELEPHONE ENCOUNTER
Called patient, line busy  Called again, there was no answer  No option to leave a voicemail  Second failed attempt to contact patient for follow up call  Letter mailed to home address on file to let patient know office is trying to contact them  Closing encounter  Will reopen when/if call is returned

## 2019-11-26 ENCOUNTER — OFFICE VISIT (OUTPATIENT)
Dept: OCCUPATIONAL THERAPY | Facility: CLINIC | Age: 44
End: 2019-11-26
Payer: COMMERCIAL

## 2019-11-26 ENCOUNTER — OFFICE VISIT (OUTPATIENT)
Dept: SPEECH THERAPY | Facility: CLINIC | Age: 44
End: 2019-11-26
Payer: COMMERCIAL

## 2019-11-26 DIAGNOSIS — I61.9 INTRAPARENCHYMAL HEMORRHAGE OF BRAIN (HCC): ICD-10-CM

## 2019-11-26 DIAGNOSIS — R13.11 DYSPHAGIA, ORAL PHASE: ICD-10-CM

## 2019-11-26 DIAGNOSIS — I61.9 NONTRAUMATIC INTRACEREBRAL HEMORRHAGE, UNSPECIFIED CEREBRAL LOCATION, UNSPECIFIED LATERALITY (HCC): Primary | ICD-10-CM

## 2019-11-26 DIAGNOSIS — R47.1 DYSARTHRIA: Primary | ICD-10-CM

## 2019-11-26 PROCEDURE — 97112 NEUROMUSCULAR REEDUCATION: CPT

## 2019-11-26 PROCEDURE — 97530 THERAPEUTIC ACTIVITIES: CPT

## 2019-11-26 PROCEDURE — 92507 TX SP LANG VOICE COMM INDIV: CPT

## 2019-11-26 NOTE — PROGRESS NOTES
Daily Note     Today's date: 2019  Patient name: Earnest Munoz  : 1975  MRN: [id number]  Referring provider: Lance Bryant MD  Dx:   Encounter Diagnosis     ICD-10-CM    1  Nontraumatic intracerebral hemorrhage, unspecified cerebral location, unspecified laterality ([facility] [address] ) I61 9    2  Intraparenchymal hemorrhage of brain (HCC) I61 9                   Subjective: I feel like I have an issue with my peripheral vision since my stroke        Objective:   Manual                                                                                          Exercise Diary  19   T-putty  Without objects     With objects      Green  6 pennies  4 beans  1 button      Digiflex    indiv digits  Green  30  30 Green   30  difficulty Blue  30   30 Green   30  30  Green  30  30   Tension pins            FMC  Grooved pegs  Rice w/ objects  Beans w/ tweezers  Nuts and bolts     3min 18 secs       L 27 beans         2 min 46 sec       Purdue pegboard 1 row  L 25 beans  R 56 beans  2 min timed   Trampoline w/ medicine ball  Level 5 green ball 5 min using both hands, right hand only   left hand only  Stand on 1 leg doing scategories  10 min stand on balance master, alternate hands, also stood tandem on aerofoam, doing scategories    10 min stand tandem on aerofoam, alternate hands doing scategories     Sensory sticks  Palm/dorsal left hand             UBC   10 min  10 min      Body blade             Catching ball             T-band   Row   Ext   shld FF  ABD  ER                         dynavision board  A 57/1 05  Left 55/1 09  Math 51/51 1 16 miss 2  46/47 1 21 miss1  Word 59/1 02         A 63/ 95  Math 40/43/1 26  41/44 1 22  Word   42/1 43  48/1 25  R/R   /   L/G    1 23      Various nuts and bolts           assembled and disassembled     beans off table thumb->IF,MF,RF,SF    x50         MRMT place one row             MRMT turn 1 row            lacing beads          40 beads on/off    brain game  30 min  35 min     30 min    flex bar  Wrist flex and ext  Sup/pro  Master mind  Green  15/15  15/15  Green  15/15     15/15  Green  20/20     20/20  20 min  Green  20/20    20/20      Rush hour        card 7,12                         Assessment: Tolerated treatment well  Patient demonstrated fair+ balance on aerofoam in tandem  RLE front is harder for pt  Fair attention span , somewhat eaily distracted today  Min A with problem solving during rush hour  Plan: Continue per plan of care  Progress treatment as tolerated

## 2019-11-26 NOTE — PROGRESS NOTES
Speech-Language Pathology Treatment Note    Today's date: 2019  Patient name: Jeanie Copeland  : 1975  MRN: [id number]  Referring provider: Desiree Menendez MD  Dx:   No diagnosis found  Medical History significant for:   Past Medical History:   Diagnosis Date    Depression     Hypertension     Migraine     Stroke ([facility] [address] )      Flowsheet:  Start Time: 0800  Stop Time: 0900  Total time in clinic (min): 60 minutes    Subjective: Pt arrived on time to the session which was before occupational therapy today  Pt was compliant with all therapy tasks and noted multiple times "this is good for me," illustrating his motivation during the therapy process  Objective:1  Pt will produce short passages (5-8 sentence in length) with increased speaking rate during NMES with min cues to increase speed  NMES 5 0 facial placement manipulatives comfortable <5  Min )  Naming abstract pictures 25 words 1 min  : NMES 4 0 facial placement reading 5-8 sentence paragraphs and answering comprehension questions  Pt reporting increased sensations on left side today with NMES in place  : NMES 4 0 Right side and 5 0 Left side facial placement reading 5-8 sentence paragraphs and answering compression questions  Pt initial reporting he felt his L eye twitching at 5 0, but there was no visual eye movement  Later on, pt noted he could barely feel the 5 0, however, after it was increased to 5 5, pt's eye was visibly twitching, so it was decreased back down to 5 0   :  Pt at 5 0 on both sides, pt with visible R eye twitching toward end of session  :  4 0 on both sides for 47 minutes    2  Pt will complete OME at home 10x each 3x a day to increase ROM and strength of oral structures chelle  lip trills unsuccessful today  Able to sustain intraoral pressure, transferring it is still difficult on the left, able to transfer 5 times succesfully without air loss    pt can do lip trials but unable to sustain for beyond 1-2 seconds  3  Pt will complete IOPI assessment to obtain a baseline measure Goal met see note from 11/5     4  Pt will chelle utilize swallowing strategy, lingual sweep, when consuming solids to eliminate left buccal pocketing in all instances with NMES in place  11/5 pt denies any difficulty with oral residue since last session  4 oz of thin liquids via single controlled cup sips @ 100% chelle  4 oz thin liquids via single controlled straw sips with increased delayed swallow  Change at home with meals cup only and can use straw with liquids only and not during a meal  11/12 chewing starbursts without difficulty reported at home  Swallowing still "doesn't feel normal"  Still hiccups while eating  Pt will start swallowing exercises today, taught and provided with copy  5   Patient will improve left lip strength to 80% max (13) 10x across 3 sets with 3 second holds  11/7: Completed with moderate cues initially and decreased to minimal cues over time  It was very effortful for the pt to control bulb with lips and therefore he required to hold it in with his fingers  11/12 reporting "it's tough" needed cue to hold for full 3 seconds  Needed to replace in correct spot with hands after each repetition as unable to hold in same position with mouth only  No breaks needed in between sets of 10, successful at 30 repetitions  11/14 no breaks needed better control but still felt "effortful"  Next week increase threshold  11/21 target set at 15 completed 38 repetitions and only 1 break needed with 3 second holds  6   Pt will improve attention during divided attention tasks with decreased timing and cueing over time 11/7: Pt verbally answering comprehension questions asked by the therapist after reading a paragraph with distraction (e g  Music) chelle @81% accy  Pt sorting Blink cards by shape/number with music distraction  Trial 1- 1 error in 3:50, Trial 2- 2 errors 3:50   Verbal labeling Blink cards by shape/number with music distraction  Trial 1- 5 errors in 2:35, Trial 2- 5 errors in 2:25, Trial 3- 2 errors in 2:20  11/12 scattegories (verbal naming) while sitting in chair with music on named 11 items w/ list 2 letter /s/<2 min, list 1 letter / i/ 6 items 4 min  Balancing on foam mat mental maipulation 4 words bwd 100%, 5 words related bwd 50% chelle ( occasional transposition) ,5#'s low to high 100% chelle and 6 #'s low to high @ 3/4 trials chelle  11/14: Balance on black foam mat during NMES 5 words bwd chelle @30% increasing to 90% with transposition, 4 words ABC chelle @70%  Balance on blue beam mat during NMES 6 #s low to high chelle @70%, 6 #s high to low chelle @ 80% 11/21 door open rush hour level 1 <2 min, level 5 <1 min chelle, and level 10 solved 3 min chelle  11/26: gravity maze door open level 1- 1 minute, level 2- 1 minute, level 5- 30 seconds, level 10- 1 minute, level 15- 4 minute min cue, level 16- 5:30 minute min cue   Cat crimes door open level 1- <1 minute, level 5- 3 minutes min cue, level 6- 9 minutes min cue    Date Task type Task Average  SRO Early  Late  Variance    11/19 Baseline  "  (+) visual and VS  "  "  (+) guide  "  "  "  " 69  77  46  60  45  71  70  59  41  46 6  10  21  13  24  19  16  17  29  18 85  84  78  90  79  83  79  78  61  75 15  16  22  10  21  17  21  22  39  25 60  63  46  42  40  64  76  61  49  48   11/21  seated vis/guide 2 min VS  " abc standing  *RT sit aud  "  * sort cards by suit    38    57  88  63  52 27    12  11  21  19 59    56  89  75  66 41    44  11  25  34 47    71  70  60  61   11/25  vis/guide 2m  "  RT guide only  (+) colors *color blind  (+) shapes  "  (+) shapes & number alt 43  42  58  70  72  65  68 24  24  16  13  10  18  12 60  74  83  86  77  78  83 40  26  17  14  23  22  17 57  50  57  60  70  68  58   11/26  vis/guide 2 min  RT aud guide 2 min  + sort shape & number  " 37  55  79  46 32  21  9  23 58  70  90  69 42  30  10  31 47  65  65  51 Assessment: Pt worked well in therapy today and was very motivated  He noted that gravity maze was a good cognitive task for him to increase flexible thinking, "my brain refuses to believe that there is a solution " Pt did great with the metronome today and benefited from reminders to slow down since he consistently comes in early  Pt noted that he did better on metronome tasks when he was paying attention to sorting cards rather than paying attention to the beat  Pt self-corrected all errors made during sorting task        Plan:  Patients would benefit from: Speech/ language therapy and Dysphagia therapy with NMES   Frequency:1-2x weekly   Duration:4-5 weeks   Pt's vocal quality will be monitored    Intervention certification from: 14/05/4643  Intervention certification to: 83/56/4669  Intervention Comments: Visit #7    Homework: 11/5 start oral motor exercises 11/12 swallowing exericses    Initial evaluation 10/31/19

## 2019-11-27 ENCOUNTER — APPOINTMENT (OUTPATIENT)
Dept: SPEECH THERAPY | Facility: CLINIC | Age: 44
End: 2019-11-27
Payer: COMMERCIAL

## 2019-11-27 ENCOUNTER — APPOINTMENT (OUTPATIENT)
Dept: OCCUPATIONAL THERAPY | Facility: CLINIC | Age: 44
End: 2019-11-27
Payer: COMMERCIAL

## 2019-11-29 NOTE — PROGRESS NOTES
OT Evaluation     Today's date: 2019  Patient name: Angel Pimentel  : 1975  MRN: [id number]  Referring provider: Caitlin Otto MD  Dx:   Encounter Diagnosis     ICD-10-CM    1  Nontraumatic intracerebral hemorrhage, unspecified cerebral location, unspecified laterality [facility]) I61 9                   Assessment  Assessment details: Angel Pimentel is a 45 y o  male with a diagnosis of hemorrhagic CVA  A moderate complexity evaluation was completed today  The patient is having difficulty with picking up small objects, reacting quickly with left hand, sensing hot/cold temperature, opening jar lids, and tolerating activity for more than 1 hour without fatigue   Findings show an impairment with strength, activity tolerance, and pain which limits completion of ADL's/IADL's, and recreational activities  Patient will benefit from OT services to reach goals  REASSESSMENT (19): Patient was seen for   Impairments: abnormal coordination, activity intolerance, impaired balance, impaired physical strength, lacks appropriate home exercise program and safety issue    Symptom irritability: moderateUnderstanding of Dx/Px/POC: good   Prognosis: good    Goals  STG's In 2 weeks:  1  Patient will increase strength of left  to 55# for lifting and carrying items  2  Patient will demonstrate good carryover and independence with HEP  3  Patient will improve sensitivity of numbness in left dorsum and palm of left hand by detecting the 4 56 monofilament with 80% accuracy  LTG's In 4-6 weeks:  1  Patient will improve fine motor coordination by completing the 9 hole peg test with left hand <35 seconds  2  Patient will increase strength of left  to 80#, pinch to 17#, left shld ABD/ER to 5/5 for lifting and carrying items  3   Patient will improve sensitivity of numbness in left dorsum and palm of left hand by detecting the 4 56 monofilament with 100% accuracy    4  Patient will tolerate at least 3 hours of activity with occasional rest breaks and no reported or signs of fatigue  Plan  Patient would benefit from: OT eval and skilled occupational therapy  Planned modality interventions: fluidotherapy  Planned therapy interventions: strengthening, therapeutic activities, therapeutic exercise, home exercise program, functional ROM exercises, patient education, fine motor coordination training, ADL retraining, manual therapy and self care  Frequency: 2x week  Duration in visits: 8  Duration in weeks: 4  Plan of Care beginning date: 2019  Plan of Care expiration date: 2019  Treatment plan discussed with: patient        Subjective Evaluation    History of Present Illness  Date of onset: 10/14/2019  Mechanism of injury: Intense HAs started 7 years ago, followed for awhile, tried medications  Tried to manage HAs medically, nothing really helped so he didn't take the meds  Patient voices that two days before he had his stroke, he felt dizzy  This had happened before due to dehydration  Then two days later he was lifting weights and didn't feel well  He noted numbness in left UE, difficulty with fine motor control  Patient reports she called his sister and she said he sounded normal   He still wasn't feeling right, drove himself to the ED  Patient had a CT at the hospital, (+) bleed  Taken via ambulance to [facility]  He was seen by neurosurgery and was deemed non-surgical at that point  He was admitted to the ICU for several days  Then ultimately transferred to ARU  He was there for approximately 1 5 weeks total   He was discharged this past Thursday    Quality of life: excellent    Pain  Current pain ratin  At best pain ratin  At worst pain ratin    Social Support  Lives with: parents    Employment status: not working  Hand dominance: right    Treatments  Current treatment: occupational therapy  Patient Goals  Patient goals for therapy: increased strength, return to sport/leisure activities and independence with ADLs/IADLs          Objective     Observations     Additional Observation Details  Patient reported that his left hand felt as if it was burning when washing his hand with warm water this a m  Sensation on palm of hand 7/10 accuracy with 4 56 monofilament(impaired on distal palmar crease),  0/10 accuracy on dorsum of hand    Active Range of Motion   Left Shoulder   Normal active range of motion    Right Shoulder   Normal active range of motion    Left Elbow   Normal active range of motion    Right Elbow   Normal active range of motion    Left Wrist   Normal active range of motion    Right Wrist   Normal active range of motion    Left Thumb   Opposition: WFL's    Right Thumb   Opposition: WFL's    Passive Range of Motion     Additional Passive Range of Motion Details  9 hole peg test right hand: 30 sec  Left hand 57 sec      Strength/Myotome Testing     Left Shoulder     Planes of Motion   Flexion: 4   Extension: 4   Abduction: 4-   Adduction: 5   External rotation at 0°: 4   Internal rotation at 0°: 5     Isolated Muscles   Biceps: 5   Triceps: 4     Right Shoulder     Planes of Motion   Flexion: 5   Extension: 5   Abduction: 5   External rotation at 0°: 5   Internal rotation at 0°: 5     Isolated Muscles   Biceps: 5   Triceps: 5     Left Elbow   Flexion: 5  Extension: 5  Forearm supination: 5  Forearm pronation: 5    Right Elbow   Normal strength    Left Wrist/Hand   Wrist extension: 5  Wrist flexion: 5  Radial deviation: 5  Ulnar deviation: 5     (2nd hand position)     Trial 1: 45    Trial 2: 40    Thumb Strength  Key/Lateral Pinch     Trail 1: 13  Tip/Two-Point Pinch     Trial 1: 6    Right Wrist/Hand   Wrist extension: 5  Wrist flexion: 5  Radial deviation: 5  Ulnar deviation: 5     (2nd hand position)     Trial 1: 95    Trial 2: 92    Thumb Strength   Key/Lateral Pinch     Trial 1: 19  Tip/Two-Point Pinch     Trial 1: 13             Precautions: N/A         Manual                                                                                          Exercise Diary  11/19/19 11/21/19 11/25/19 11/26/19    T-putty  Without objects     With objects      Green  6 pennies  4 beans  1 button      Digiflex    indiv digits  Green  30  30 Green   30  difficulty Blue  30   30 Green   30  30    Tension pins           FMC  Grooved pegs  Rice w/ objects  Beans w/ tweezers  Nuts and bolts     3min 18 secs       L 27 beans         2 min 46 sec          Trampoline w/ medicine ball  Level 5 green ball 5 min using both hands, right hand only   left hand only  Stand on 1 leg doing scategories  10 min stand on balance master, alternate hands, also stood tandem on aerofoam, doing scategories    10 min stand tandem on aerofoam, alternate hands doing scategories     Sensory sticks  Palm/dorsal left hand             UBC   10 min  10 min      Body blade             Catching ball             T-band   Row   Ext   shld FF  ABD  ER                         dynavision board  A 57/1 05  Left 55/1 09  Math 51/51 1 16 miss 2  46/47 1 21 miss1  Word 59/1 02         A 63/ 95  Math 40/43/1 26  41/44 1 22  Word   42/1 43  48/1 25  R/R   27/27/ 97  L/G   27/27 1 23      Various nuts and bolts               beans off table thumb->IF,MF,RF,SF    x50        MRMT place one row            MRMT turn 1 row           lacing beads             brain game  30 min  35 min         flex bar  Wrist flex and ext  Sup/pro  Master mind  Green  15/15  15/15  Green  15/15     15/15  Green  20/20     20/20  20 min  Green  20/20    20/20      Rush hour        card 7,12

## 2019-12-02 ENCOUNTER — APPOINTMENT (OUTPATIENT)
Dept: OCCUPATIONAL THERAPY | Facility: CLINIC | Age: 44
End: 2019-12-02
Payer: COMMERCIAL

## 2019-12-02 ENCOUNTER — APPOINTMENT (OUTPATIENT)
Dept: SPEECH THERAPY | Facility: CLINIC | Age: 44
End: 2019-12-02
Payer: COMMERCIAL

## 2019-12-05 ENCOUNTER — OFFICE VISIT (OUTPATIENT)
Dept: SPEECH THERAPY | Facility: CLINIC | Age: 44
End: 2019-12-05
Payer: COMMERCIAL

## 2019-12-05 ENCOUNTER — EVALUATION (OUTPATIENT)
Dept: OCCUPATIONAL THERAPY | Facility: CLINIC | Age: 44
End: 2019-12-05
Payer: COMMERCIAL

## 2019-12-05 DIAGNOSIS — R13.11 DYSPHAGIA, ORAL PHASE: ICD-10-CM

## 2019-12-05 DIAGNOSIS — I61.9 INTRAPARENCHYMAL HEMORRHAGE OF BRAIN (HCC): ICD-10-CM

## 2019-12-05 DIAGNOSIS — R47.1 DYSARTHRIA: Primary | ICD-10-CM

## 2019-12-05 DIAGNOSIS — I61.9 NONTRAUMATIC INTRACEREBRAL HEMORRHAGE, UNSPECIFIED CEREBRAL LOCATION, UNSPECIFIED LATERALITY (HCC): Primary | ICD-10-CM

## 2019-12-05 PROCEDURE — 96125 COGNITIVE TEST BY HC PRO: CPT

## 2019-12-05 PROCEDURE — 97110 THERAPEUTIC EXERCISES: CPT

## 2019-12-05 PROCEDURE — 97112 NEUROMUSCULAR REEDUCATION: CPT

## 2019-12-05 NOTE — PROGRESS NOTES
Daily Note     Today's date: 2019  Patient name: Andreas Green  : 1975  MRN: [id number]  Referring provider: Anette Celestin MD  Dx:   Encounter Diagnosis     ICD-10-CM    1  Nontraumatic intracerebral hemorrhage, unspecified cerebral location, unspecified laterality ([facility] [address] ) I61 9    2  Intraparenchymal hemorrhage of brain (HCC) I61 9                   Subjective: I think I'm doing better, I can tell I'm still not right        Objective:   Manual                                                                                          Exercise Diary  19   T-putty  Without objects     With objects      Green  6 pennies  4 beans  1 button       Digiflex    indiv digits  Green  30  30 Green   30  difficulty Blue  30   30 Green   30  30 Blue   30  30   Tension pins             FMC  Grooved pegs  Rice w/ objects  Beans w/ tweezers  Nuts and bolts     3min 18 secs       L 27 beans          2 min 46 sec       L 2 min  R 1 min 21sec   Trampoline w/ medicine ball  Level 5 green ball 5 min using both hands, right hand only   left hand only  Stand on 1 leg doing scategories  10 min stand on balance master, alternate hands, also stood tandem on aerofoam, doing scategories    10 min stand tandem on aerofoam, alternate hands doing scategories     Sensory sticks  Palm/dorsal left hand             UBC   10 min  10 min    10 min   Body blade             Catching ball             T-band   Row   Ext   shld FF  ABD  ER                         dynavision board  A 57/1 05  Left 55/1 09  Math 51/51 1 16 miss 2  46/47 1 21 miss1  Word 59/1 02         A 63/ 95  Math 40/43/1 26  41/44 1 22  Word   42/1 43  48/1 25  R/R     L/G    1 23      Various nuts and bolts               beans off table thumb->IF,MF,RF,SF    x50         MRMT place one row              MRMT turn 1 row             lacing beads             brain game  30 min  35 min         flex bar  Wrist flex and ext  Sup/pro  Master mind  Green  15/15  15/15  Green  15/15     15/15  Green  20/20     20/20  20 min  Green  20/20     20/20      Rush hour        card 7,12      geolastic bands          butterfly, letter B            Assessment: Tolerated treatment well  Patient exhibited good technique with therapeutic exercises and would benefit from continued OT  Increase FM coordination noted this date  Plan: Continue per plan of care

## 2019-12-05 NOTE — PROGRESS NOTES
Speech Language Pathology Re-Evaluation  Today's date: 2019  Patient name: Robel Julio    : 1975        Referring provider: Amber Green MD  Dx:   No diagnosis found  Start Time: 09  Stop Time: 1000  Total time in clinic (min): 60 minutes    Subjective Comments: Patient arrived on time session  He reports decreased taste sensation and reports hiccups have improved with slowing rate of intake  Pt reports hot/cold sensation has improved significantly  Dysphagia symptoms have resolved  Pt no longer has headaches which he suffered from for "years" prior to his stroke  Blood pressure medication decrease in beginning-mid of November which patient reports "I feel so much better now"  Next Neurology appointment January  Repeat MRI brain   Repeat CT head/neck beginning   Medical History significant for:   Past Medical History:   Diagnosis Date    Depression     Hypertension     Migraine     Stroke [facility])        Hearing:Other Pt reports tenitis but feels hearing is okay  Pt unsure the last time he had a hearing screening "it's been a long time "  Pt no longer has "ringing in ears" which he suffered from for "years" prior to stroke  Vision:Pt will be following up with eye doctor once finances improve  Medication List:   Current Outpatient Medications   Medication Sig Dispense Refill    amLODIPine (NORVASC) 10 mg tablet Take 1 tablet (10 mg total) by mouth daily 30 tablet 0    aspirin (ECOTRIN LOW STRENGTH) 81 mg EC tablet Take 1 tablet (81 mg total) by mouth daily 30 tablet 0    lisinopril (ZESTRIL) 20 mg tablet Take 1 tablet (20 mg total) by mouth daily 30 tablet 0    metoclopramide (REGLAN) 10 mg tablet Take 1 tablet (10 mg total) by mouth 2 (two) times a day as needed (for headache) Not to be used more than 2 days per week   15 tablet 0    nortriptyline (PAMELOR) 10 mg capsule Take 1 capsule (10 mg total) by mouth daily at bedtime 30 capsule 0 No current facility-administered medications for this visit  Allergies: Allergies   Allergen Reactions    Other      cats     Primary Language: English  Preferred Language: English     Home Environment/ Lifestyle: Stays with mom in [facility] [place] used to live with nephew in [facility] [address]  Lives with mom, older brother, and aunt  Not working and wasn't before either due to chronic migraines  Hasn't returned back to driving yet  Highest Level of Education: High school    Current / Prior Services being received: He has been discharged from physical therapy and continues occupational therapy and speech  Mental Status: Alert  Behavior Status:Cooperative  Communication Modalities: Verbal  Recent Speech/ Language therapy:Acute hospital setting   Rehabilitation Prognosis:Excellent rehab potential to reach the established goals    Assessments:  1  Oral motor examination:  Labial symmetry [facility])   Labial strength (slightly reduced left)  Labial ROM (WFLreduced left)  Labial Sensation (WFL)  Facial symmetry (WFL)   Facial strength (WFL)  Facial ROM (WFL)  Facial Sensation (WFL)  Lingual symmetry ( WFL)   Lingual strength (WFL)  LingualROM (WFL)  Lingual Sensation (WFL)  Velum ( WFL)  Gag (WFL)  Mandible (WFL)  Tracheostomy (no)  Oral secretions (WFL)  Volitional cough (Pt reports no concern)  Dentition (Full dentition)    Can puff cheeks with air and can minimally transfer back and forth  Vocal quality back to "baseline"  The IOPI Pro is used by medical professionals to measure, evaluate, and increase the strength and endurance of the tongue and lip in patients with oral motor disorders, including dysphagia and dysarthria  The IOPI measures the maximum pressure (Pmax) a patient can produce in an air-filled bulb when it is compressed as hard as possible by the tongue or lip against a hard surface (e g  The palate or teeth, respectively)   Pmax is a measure of strength, expressed in kilopascals (kPa, an international unit of pressure)  For patients with dysphagia or dysarthria, oral motor fatigability may be of interest   The IOPI Pro can be used to assess tongue fatigability  Low endurance values are an indicator of a high fatigability  Endurance is measured with the IOPI Pro by quantifying the length of time that a patient can maintain 50% of his or her Pmax  This procedure is conducted in Target Mode by setting the target value to 50% of the patient's Pmax and timing how long the patient can hold the top (green) light on  The medical professional determines what target value is appropriate for exercise therapy purposes and provides specific instructions to the patient for a particular exercise protocol  In Target Mode, the pressure required to illuminate the green light a the top of the light array can be adjusted using the Set Target arrow buttons  This green light is used as a visual target for the patient  Tongue tip mean peak max across 3 trials was 42 Normative data mean for a middle aged male (ages 38-57) is 61   Tongue back mean peak max across 3 trials was 40 Normative data mean for a middle aged male is usually 5-10% lower than anterior tongue strength   Right lip mean peak max across 3 trials was 29 Normative data mean for a male is 28  Left lip mean peak max across 3 trials was 16 Normative data mean for a male is 28  The Repeatable Battery for the Assessment of Neuropsychological Status (RBANS) is a brief, individually-administered assessment which measures attention, language, visuospatial/constructional abilities, and immediate & delayed memory  The RBANS is intended for use with adolescents to adults, ages 15 to 80 years  The following results were obtained during the administration of the assessment  Form: A    Cognitive Domain/Subtest: Index Score: Percentile Rank: Classification:   IMMEDIATE MEMORY 103 58%ile Average        1  List Learning (28/40)        2   Story Memory (20/24)       VISUOSPATIAL/  CONSTRUCTIONAL 126 96%ile Superior        3  Figure Copy (20/20)        4  Line Orientation (20/20)       LANGUAGE 95 37%ile Average        5  Picture Naming (10/10)        6  Semantic Fluency (67/25)       ATTENTION 97 42%ile Average        7  Digit Span (13/16)        8  Coding (41/89)       DELAYED MEMORY 107 68%ile Average        9  List Recall (6/10)        10  List Recognition (20/20)        11  Story Recall (9/12)        12  Figure Recall (18/20)         Sum of Index Scores:  528   Total Score:  107   Percentile: 68%   Classification: Average         Goals  Short Term Goals:   1  Pt will produce short passages (5-8 sentence in length) with increased speaking rate during NMES with min cues to increase speed  This goal is ongoing, patient continues to present with improvement and symmetry across all measures  2  Pt will complete OME at home 10x each 3x a day to increase ROM and strength of oral structures chelle  This goal has been met  3  Pt will complete IOPI assessment to obtain a baseline measure  This goal has been met  The IOPI Pro is used by medical professionals to measure, evaluate, and increase the strength and endurance of the tongue and lip in patients with oral motor disorders, including dysphagia and dysarthria  The IOPI measures the maximum pressure (Pmax) a patient can produce in an air-filled bulb when it is compressed as hard as possible by the tongue or lip against a hard surface (e g  The palate or teeth, respectively)  Pmax is a measure of strength, expressed in kilopascals (kPa, an international unit of pressure)  For patients with dysphagia or dysarthria, oral motor fatigability may be of interest   The IOPI Pro can be used to assess tongue fatigability  Low endurance values are an indicator of a high fatigability  Endurance is measured with the IOPI Pro by quantifying the length of time that a patient can maintain 50% of his or her Pmax  This procedure is conducted in Target Mode by setting the target value to 50% of the patient's Pmax and timing how long the patient can hold the top (green) light on  The medical professional determines what target value is appropriate for exercise therapy purposes and provides specific instructions to the patient for a particular exercise protocol  In Target Mode, the pressure required to illuminate the green light a the top of the light array can be adjusted using the Set Target arrow buttons  This green light is used as a visual target for the patient  Tongue tip mean peak max across 3 trials was Normative data mean for a middle aged male (ages 38-57) is   Tongue back mean peak max across 3 trials was Normative data mean for a middle aged male is usually 5-10% lower than anterior tongue strength   Right lip mean peak max across 3 trials was Normative data mean for a male is 28  Left lip mean peak max across 3 trials was Normative data mean for a male is 35        4  Pt will chelle utilize swallowing strategy, lingual sweep, when consuming solids to eliminate left buccal pocketing in all instances with NMES in place  This goal has been met  5   Patient will improve left lip strength to 80% max (13) 10x across 3 sets with 3 second holds  This goal has been met  6   Pt will improve attention during divided attention tasks with decreased timing and cueing over time  This goal is ongoing at this time, please reference last treatment note for most recent areas of attention  Patient continues to make improvements but requires additional cues to complete all tasks  Updated short term goals:  1  Patient will produce short passages (5-8 sentence in length) with increased speaking rate during NMES with min cues to increase speed  2   Patient will improve attention during divided attention tasks with decreased timing and cueing over time    3   Patient will report increased tasks at home as appropriate (cooking, cleaning, shopping, etc )      Long Term Goals:   1  Pt will increase oral motor function in all environments  2  Pt will decrease signs and symptoms of dysphagia during all instances of consumption  Impressions/ Recommendations  Impressions: Nany Foy has made significant improvements in speech therapy in the areas of dysarthria, swallowing and cognition  He is very motivated and continues to report improvement across all areas in therapy sessions  He is approaching a plan for discharged and will continue therapy over the next 2-3 weeks focusing on NMES and oral motor exercise program to continue to improve left sided labial weakness and divided attention  Plan of care has been discussed with patient and he verbalizes his understanding       Recommendations:   Patients would benefit from: Speech/ language therapy and Dysphagia therapy with NMES   Frequency:1-2x weekly   Duration:2-3 weeks   Pt's vocal quality will be monitored    Intervention certification from:  89/8/8824  Intervention certification to: 1/65/2575  Intervention Comments: Visit #8

## 2019-12-05 NOTE — PROGRESS NOTES
OT RE-EVALUATION    Today's date: 2019  Patient name: Alva Nichols  : 1975  MRN: [id number]  Referring provider: Oswaldo Lovett MD  Dx:   Encounter Diagnosis     ICD-10-CM    1  Nontraumatic intracerebral hemorrhage, unspecified cerebral location, unspecified laterality ([facility] [address] ) I61 9    2  Intraparenchymal hemorrhage of brain [facility]) I61 9                   Assessment  Assessment details: Alva Nichols is a 45 y o  male with a diagnosis of hemorrhagic CVA  A moderate complexity evaluation was completed today  The patient is having difficulty with picking up small objects, reacting quickly with left hand, sensing hot/cold temperature, opening jar lids, and tolerating activity for more than 1 hour without fatigue   Findings show an impairment with strength, activity tolerance, and pain which limits completion of ADL's/IADL's, and recreational activities  Patient will benefit from OT services to reach goals  REASSESSMENT (19): Patient was seen for 10 treatment sessions  Patient reports improvement with opening jar lids, hot/cold sensation, picking up small objects, and activity tolerance  Patient reports independence with self-care tasks  Improvement noted with  and pinch strength, fine motor coordination, and   Patient admits that his attention skills need to be improved  Patient would benefit from continued OT services to reach goals  Impairments: abnormal coordination, activity intolerance, impaired balance, impaired physical strength, lacks appropriate home exercise program and safety issue    Symptom irritability: moderateUnderstanding of Dx/Px/POC: good   Prognosis: good    Goals  STG's In 2 weeks:  1  Patient will increase strength of left  to 55# for lifting and carrying items  MET  2  Patient will demonstrate good carryover and independence with HEP  MET  3   Patient will improve sensitivity of numbness in left dorsum and palm of left hand by detecting the 4 56 monofilament with 80% accuracy  MET  LTG's In 4-6 weeks:  1  Patient will improve fine motor coordination by completing the 9 hole peg test with left hand <35 seconds  Not met  2  Patient will increase strength of left  to 80#, pinch to 17#, left shld ABD/ER to 5/5 for lifting and carrying items  NOT MET  3  Patient will improve sensitivity of numbness in left dorsum and palm of left hand by detecting the 4 56 monofilament with 100% accuracy  MET  4  Patient will tolerate at least 3 hours of activity with occasional rest breaks and no reported or signs of fatigue  MET    ADDED GOALS (12/5/19):  1  Patient will detect the 2 83 monofilament on dorsum and palm of left hand with at least 80% accuracy  2  Patient will improve activity tolerance with no rest breaks for 2 hours of therapy and reported no naps for the day  3  Patient will improve attention skills by reaction time of <1 second by touching random lit buttons on [employer] board and completing math or reading activities  Plan  Patient would benefit from: OT eval and skilled occupational therapy  Planned modality interventions: fluidotherapy  Planned therapy interventions: strengthening, therapeutic activities, therapeutic exercise, home exercise program, functional ROM exercises, patient education, fine motor coordination training, ADL retraining, manual therapy, self care and neuromuscular re-education  Frequency: 2x week  Duration in visits: 8  Duration in weeks: 4  Plan of Care beginning date: 12/5/2019  Plan of Care expiration date: 1/5/2020  Treatment plan discussed with: patient        Subjective Evaluation    History of Present Illness  Date of onset: 10/14/2019  Mechanism of injury: Intense HAs started 7 years ago, followed for awhile, tried medications  Tried to manage HAs medically, nothing really helped so he didn't take the meds  Patient voices that two days before he had his stroke, he felt dizzy  This had happened before due to dehydration    Then two days later he was lifting weights and didn't feel well  He noted numbness in left UE, difficulty with fine motor control  Patient reports she called his sister and she said he sounded normal   He still wasn't feeling right, drove himself to the ED  Patient had a CT at the hospital, (+) bleed  Taken via ambulance to [facility]  He was seen by neurosurgery and was deemed non-surgical at that point  He was admitted to the ICU for several days  Then ultimately transferred to ARU  He was there for approximately 1 5 weeks total   He was discharged this past Thursday  Quality of life: excellent    Pain  Current pain ratin  At best pain ratin  At worst pain ratin    Social Support  Lives with: parents    Employment status: not working  Hand dominance: right    Treatments  Current treatment: occupational therapy  Patient Goals  Patient goals for therapy: increased strength, return to sport/leisure activities and independence with ADLs/IADLs          Objective     Observations     Additional Observation Details  EVAL:Patient reported that his left hand felt as if it was burning when washing his hand with warm water this a m  Sensation on palm of hand 7/10 accuracy with 4 56 monofilament(impaired on distal palmar crease),  0/10 accuracy on dorsum of hand    REASSESSMENT on : 100% accuracy on dorsum and palm of hand with 4 56 and 4 31 monofilament  100% accuracy on left palm with 3 61 and 80% on dorsum of hand with 3 61 monofilament  The 3 61 filament indicates diminished light touch        Active Range of Motion   Left Shoulder   Normal active range of motion    Right Shoulder   Normal active range of motion    Left Elbow   Normal active range of motion    Right Elbow   Normal active range of motion    Left Wrist   Normal active range of motion    Right Wrist   Normal active range of motion    Left Thumb   Opposition: WFL's    Right Thumb   Opposition: WFL's    Passive Range of Motion     Additional Passive Range of Motion Details  9 hole peg test right hand: 30 sec     Left hand 36 seconds (was 57 sec )    Strength/Myotome Testing     Left Shoulder     Planes of Motion   Flexion: 4   Extension: 4   Abduction: 4-   Adduction: 5   External rotation at 0°: 4   Internal rotation at 0°: 5     Isolated Muscles   Biceps: 5   Triceps: 4     Right Shoulder     Planes of Motion   Flexion: 5   Extension: 5   Abduction: 5   External rotation at 0°: 5   Internal rotation at 0°: 5     Isolated Muscles   Biceps: 5   Triceps: 5     Left Elbow   Flexion: 5  Extension: 5  Forearm supination: 5  Forearm pronation: 5    Right Elbow   Normal strength    Left Wrist/Hand   Wrist extension: 5  Wrist flexion: 5  Radial deviation: 5  Ulnar deviation: 5     (2nd hand position)     Trial 1: 70    Trial 2: 65    Trial 3: 65    Thumb Strength  Key/Lateral Pinch     Trail 1: 15  Tip/Two-Point Pinch     Trial 1: 11    Right Wrist/Hand   Wrist extension: 5  Wrist flexion: 5  Radial deviation: 5  Ulnar deviation: 5     (2nd hand position)     Trial 1: 95    Trial 2: 92    Thumb Strength   Key/Lateral Pinch     Trial 1: 19  Tip/Two-Point Pinch     Trial 1: 13             Precautions: N/A         Manual                                                                                          Exercise Diary  11/19/19 11/21/19 11/25/19 11/26/19 12/5/19   T-putty  Without objects     With objects      Green  6 pennies  4 beans  1 button   REASSESSMENT   Digiflex    indiv digits  Green  30  30 Green   30  difficulty Blue  30   30 Green   30  30    Tension pins           FMC  Grooved pegs  Rice w/ objects  Beans w/ tweezers  Nuts and bolts     3min 18 secs       L 27 beans         2 min 46 sec          Trampoline w/ medicine ball  Level 5 green ball 5 min using both hands, right hand only   left hand only  Stand on 1 leg doing scategories  10 min stand on balance master, alternate hands, also stood tandem on aerofoam, doing scategories    10 min stand tandem on aerofoam, alternate hands doing scategories     Sensory sticks  Palm/dorsal left hand             UBC   10 min  10 min      Body blade             Catching ball             T-band   Row   Ext   shld FF  ABD  ER                         dynavision board  A 57/1 05  Left 55/1 09  Math 51/51 1 16 miss 2  46/47 1 21 miss1  Word 59/1 02         A 63/ 95  Math 40/43/1 26  41/44 1 22  Word   42/1 43  48/1 25  R/R   27/27/ 97  L/G   27/27 1 23      Various nuts and bolts               beans off table thumb->IF,MF,RF,SF    x50        MRMT place one row            MRMT turn 1 row           lacing beads             brain game  30 min  35 min         flex bar  Wrist flex and ext  Sup/pro  Master mind  Green  15/15  15/15  Green  15/15     15/15  Green  20/20     20/20  20 min  Green  20/20    20/20      Rush hour        card 7,12

## 2019-12-09 ENCOUNTER — OFFICE VISIT (OUTPATIENT)
Dept: SPEECH THERAPY | Facility: CLINIC | Age: 44
End: 2019-12-09
Payer: COMMERCIAL

## 2019-12-09 ENCOUNTER — OFFICE VISIT (OUTPATIENT)
Dept: OCCUPATIONAL THERAPY | Facility: CLINIC | Age: 44
End: 2019-12-09
Payer: COMMERCIAL

## 2019-12-09 DIAGNOSIS — I61.9 NONTRAUMATIC INTRACEREBRAL HEMORRHAGE, UNSPECIFIED CEREBRAL LOCATION, UNSPECIFIED LATERALITY (HCC): Primary | ICD-10-CM

## 2019-12-09 DIAGNOSIS — I61.9 INTRAPARENCHYMAL HEMORRHAGE OF BRAIN (HCC): ICD-10-CM

## 2019-12-09 DIAGNOSIS — R13.11 DYSPHAGIA, ORAL PHASE: Primary | ICD-10-CM

## 2019-12-09 PROCEDURE — 97112 NEUROMUSCULAR REEDUCATION: CPT

## 2019-12-09 PROCEDURE — 97110 THERAPEUTIC EXERCISES: CPT

## 2019-12-09 PROCEDURE — 92507 TX SP LANG VOICE COMM INDIV: CPT

## 2019-12-09 NOTE — PROGRESS NOTES
Daily Note     Today's date: 2019  Patient name: Nancy Fernandez  : 1975  MRN: [id number]  Referring provider: Gita Robledo MD  Dx:   Encounter Diagnosis     ICD-10-CM    1  Nontraumatic intracerebral hemorrhage, unspecified cerebral location, unspecified laterality ([facility] [address] ) I61 9    2  Intraparenchymal hemorrhage of brain ([facility]) I61 9                   Subjective: I helped my mom hand up brisa decorations this weekend  Objective: See treatment diary below      Assessment: Tolerated treatment well  Patient exhibited good technique with therapeutic exercises and would benefit from continued OT  Pt demonstrates improvement with increase reaction time on dynavision  Plan: Continue per plan of care  Progress treatment as tolerated         Precautions: N/A         Manual                                                                                          Exercise Diary  19   T-putty  Without objects     With objects      Green  6 pennies  4 beans  1 button   REASSESSMENT   Digiflex    indiv digits Blue  30  30 Green   30  difficulty Blue  30   30 Green   30  30    Tension pins           FMC  Grooved pegs  Rice w/ objects  Beans w/ tweezers  Nuts and bolts          L 42       L 27 beans         2 min 46 sec          Trampoline w/ medicine ball  Level 5 green ball   10 min stand on balance master, alternate hands, also stood tandem on aerofoam, doing scategories    10 min stand tandem on aerofoam, alternate hands doing scategories     Sensory sticks  Palm/dorsal left hand             UBC  10 min 10 min  10 min      Body blade             Catching ball             T-band   Row   Ext   shld FF  ABD  ER                         dynavision board  A 57/1 05  64/ 94  Left 69/ 87  Math 51/52 1 11 miss 1  60/60/ 98   Word 55/1 09  50/1 20   R/R  / 23   21  L/G    08   00  Fast 1sec  70/81/ 69        A 63/ 95  Math 4043/1 26  41/44 1  22  Word   42/1 43  48/1 25  R/R   27/27/ 97  L/G   27/27 1 23      Various nuts and bolts               beans off table thumb->IF,MF,RF,SF    x50        MRMT place one row            MRMT turn 1 row           lacing beads             brain game    35 min         flex bar  Wrist flex and ext  Sup/pro  Master mind  Green  25/25  25/25  Green  15/15     15/15  Green  20/20     20/20  20 min  Green  20/20    20/20      Rush hour        card 7,12      dominikWase[facility] Hospital and Clinicflora  78,90,46

## 2019-12-09 NOTE — PROGRESS NOTES
Speech-Language Pathology Treatment Note    Today's date: 2019  Patient name: Karen Fragoso  : 1975  MRN: [id number]  Referring provider: Marcela Salas MD  Dx:   Encounter Diagnosis     ICD-10-CM    1  Dysphagia, oral phase R13 11      Medical History significant for:   Past Medical History:   Diagnosis Date    Depression     Hypertension     Migraine     Stroke ([address] )      Flowsheet:  Start Time: 0900  Stop Time: 1000  Total time in clinic (min): 60 minutes    Subjective: Pt arrived on time to the session which was before occupational therapy today  Pt was compliant with all therapy tasks and noted multiple times "this is good for me," illustrating his motivation during the therapy process  Objective:  1  Patient will produce short passages (5-8 sentence in length) with increased speaking rate during NMES with min cues to increase speed  2   Patient will improve attention during divided attention tasks with decreased timing and cueing over time  Date Task type Task Average  SRO Early  Late  Variance     BL 3m guide RF  (+) sorting shape  (+) sorting shape/number  "  (+) remove flowers  " 41  47  70  49  56  47 31  19  14  21  20  18 66  73  76  82  76  77 44  27  24  18  24  23 49  52  84  62  69  57                                                             3   Patient will report increased tasks at home as appropriate (cooking, cleaning, shopping, etc )  :  Patient reporting he helped with brisa decor without difficulty  Will attempt to start cooking and cleaning as able  Assessment: Patient did very well in therapy today  Patient will be placed on hold following his next appointment in order to determine carryover for all tasks      Plan:  Patients would benefit from: Patients would benefit from: Speech/ language therapy and Dysphagia therapy with NMES   Frequency:1-2x weekly   Duration:2-3 weeks   Pt's vocal quality will be monitored    Intervention certification from: 65/1/8192  Intervention certification to: 3/13/4017  Intervention Comments: Visit # 11/24

## 2019-12-12 ENCOUNTER — OFFICE VISIT (OUTPATIENT)
Dept: SPEECH THERAPY | Facility: CLINIC | Age: 44
End: 2019-12-12
Payer: COMMERCIAL

## 2019-12-12 ENCOUNTER — APPOINTMENT (OUTPATIENT)
Dept: OCCUPATIONAL THERAPY | Facility: CLINIC | Age: 44
End: 2019-12-12
Payer: COMMERCIAL

## 2019-12-12 ENCOUNTER — OFFICE VISIT (OUTPATIENT)
Dept: OCCUPATIONAL THERAPY | Facility: CLINIC | Age: 44
End: 2019-12-12
Payer: COMMERCIAL

## 2019-12-12 DIAGNOSIS — I61.9 NONTRAUMATIC INTRACEREBRAL HEMORRHAGE, UNSPECIFIED CEREBRAL LOCATION, UNSPECIFIED LATERALITY (HCC): Primary | ICD-10-CM

## 2019-12-12 DIAGNOSIS — I61.9 INTRAPARENCHYMAL HEMORRHAGE OF BRAIN (HCC): ICD-10-CM

## 2019-12-12 DIAGNOSIS — R47.1 DYSARTHRIA: Primary | ICD-10-CM

## 2019-12-12 PROCEDURE — 97112 NEUROMUSCULAR REEDUCATION: CPT

## 2019-12-12 PROCEDURE — 92507 TX SP LANG VOICE COMM INDIV: CPT

## 2019-12-12 NOTE — PROGRESS NOTES
Speech and Language Therapy Discharge Report    Patient Name: Katie Jimenez   DHWCW'F Date: 12/12/19    Most Recent Assessment:  Patient arrives on time to all of his appointments and has been very compliant with all recommendations  He reports that he has been doing "anything he can, I feel like I'm doing everything I can to be able to take care of myself "  Patient has demonstrated great understanding of all of his goals including high level deductive reasoning and processing speed  He reports that his swallow function is back to normal with the exception of a few isolated incidents  He continues to work on his exercises at home and will follow up as needed  Short Term Goals at the Time of Discharge:  1  Patient will produce short passages (5-8 sentence in length) with increased speaking rate during NMES with min cues to increase speed  2   Patient will improve attention during divided attention tasks with decreased timing and cueing over time  3   Patient will report increased tasks at home as appropriate (cooking, cleaning, shopping, etc )    Discharge Information:  Patient will have follow up CT and MRI in three weeks with subsequent neurology and neurosx consulations  Participation in Treatment (at discharge):   Cooperative    Patient is discharged to [address] [place] [address] name/phone number for follow up: [phone]

## 2019-12-12 NOTE — PROGRESS NOTES
Daily Note     Today's date: 2019  Patient name: Emy Sarabia  : 1975  MRN: [id number]  Referring provider: Estella Doe MD  Dx:   Encounter Diagnosis     ICD-10-CM    1  Nontraumatic intracerebral hemorrhage, unspecified cerebral location, unspecified laterality ([facility] [address] ) I61 9    2  Intraparenchymal hemorrhage of brain (HCC) I61 9                   Subjective: I going to help my mom make cookies  Objective: See treatment diary below      Assessment: Tolerated treatment well  Patient demonstrated fatigue post treatment, exhibited good technique with therapeutic exercises and would benefit from continued OT  Good dynamic standing balance on bosu ball while multitasking  Pt reports being distracted while completing dynavision in noisy environment  Plan: Continue per plan of care  Progress treatment as tolerated         Precautions: N/A         Manual                                                                                          Exercise Diary  19   T-putty  Without objects     With objects      Green  6 pennies  4 beans  1 button   REASSESSMENT   Digiflex    indiv digits Blue  30  30 Blue  30  30   Blue  30   30 Green   30  30    Tension pins           FMC  Grooved pegs  Rice w/ objects  Beans w/ tweezers  Nuts and bolts          L 42                2 min 46 sec          Trampoline w/ medicine ball  Level 5 green ball   10 min stand on bosu ball  doing scategories    10 min stand tandem on aerofoam, alternate hands doing scategories     Sensory sticks  Palm/dorsal left hand             UBC  10 min   10 min      Body blade             Catching ball             T-band   Row   Ext   shld FF  ABD  ER  IR    Black  20  20         10   10         dynavision board  A 57/1 05  64/ 94  Left 69/ 87  Math 51/52 1 11 miss 1  60/60/ 98   Word 55/1 09  50/1 20   R/R   23   21  L/G    08  / 00  Fast 1sec  70/81/ 69    A 58/1 03  69/ 87  Left 77/ 78  Word  60/1 00  Math  48/49/1 16 miss 1  55/56 1 03  R/R   25/25/1 24  L/G  26/26/1 08  Fast  49/73/ 72      A 63/ 95  Math 40/43/1 26  41/44 1 22  Word   42/1 43  48/1 25  R/R   27/27/ 97  L/G   27/27 1 23      Various nuts and bolts               beans off table thumb->IF,MF,RF,SF           MRMT place one row            MRMT turn 1 row           lacing beads             brain game            flex bar  Wrist flex and ext  Sup/pro  Master mind  Green  25/25  25/25  Green  25/25  25/25  Green  20/20     20/20  20 min  Green  20/20    20/20      Rush hour    16,21 stand on [place] ball    card 7,12      leydi  89,57,53

## 2019-12-13 NOTE — PROGRESS NOTES
Gillian Griffith will be discharged from outpatient physical therapy care due to expiration of plan of care  No objective measures updated, Gillian Griffith is not present at time of discharge

## 2019-12-17 ENCOUNTER — APPOINTMENT (OUTPATIENT)
Dept: OCCUPATIONAL THERAPY | Facility: CLINIC | Age: 44
End: 2019-12-17
Payer: COMMERCIAL

## 2019-12-18 ENCOUNTER — OFFICE VISIT (OUTPATIENT)
Dept: OCCUPATIONAL THERAPY | Facility: CLINIC | Age: 44
End: 2019-12-18
Payer: COMMERCIAL

## 2019-12-18 DIAGNOSIS — I61.9 INTRAPARENCHYMAL HEMORRHAGE OF BRAIN (HCC): ICD-10-CM

## 2019-12-18 DIAGNOSIS — I61.9 NONTRAUMATIC INTRACEREBRAL HEMORRHAGE, UNSPECIFIED CEREBRAL LOCATION, UNSPECIFIED LATERALITY (HCC): Primary | ICD-10-CM

## 2019-12-18 PROCEDURE — 97110 THERAPEUTIC EXERCISES: CPT

## 2019-12-18 PROCEDURE — 97112 NEUROMUSCULAR REEDUCATION: CPT

## 2019-12-18 NOTE — PROGRESS NOTES
Daily Note     Today's date: 2019  Patient name: Grayson Vines  : 1975  MRN: [id number]  Referring provider: Brendon Wilson MD  Dx:   Encounter Diagnosis     ICD-10-CM    1  Nontraumatic intracerebral hemorrhage, unspecified cerebral location, unspecified laterality ([facility] [address] ) I61 9    2  Intraparenchymal hemorrhage of brain (HCC) I61 9                   Subjective: I did good baking cookies with my mom  Objective: See treatment diary below      Assessment: Tolerated treatment well  Patient demonstrated fatigue post treatment, exhibited good technique with therapeutic exercises and would benefit from continued OT  Mod difficulty standing on L foot on aerofoam mat throwing red ball doing scategories and maintaining balance  Increase fine motor coordination, fair in hand manipulation  Plan: Continue per plan of care  Progress treatment as tolerated         Precautions: N/A         Manual                                                                                          Exercise Diary  19   T-putty  Without objects     With objects        REASSESSMENT   Digiflex    indiv digits Blue  30  30 Blue  30  30   Metal Gripper  55# 30 Green   30  30    Irena Torres balls      cwand ccw 1 min     FMC  Grooved pegs  Rice w/ objects  Beans w/ tweezers  Nuts and bolts          L 42                1 min 53 sec  2 min 46 sec          Trampoline w/ medicine ball  Level 5 green ball   10 min stand on bosu ball  doing scategories   stand on left foot on aerofoam mat, red ball doing scategories 10 min stand tandem on aerofoam, alternate hands doing scategories     Sensory sticks  Palm/dorsal left hand             UBC  10 min   10 min      Body blade             Catching ball             T-band   Row   Ext   shld FF  ABD  ER  IR  Tri  bi    Black  20  20         10   10 Ros[place]  20# 30  20# 30      10# 30  6# 30  25# 30  30# 30       dynavision board  A 57/1 05  64/ 94  Left 69/ 87  Math 51/52 1 11 miss 1  60/60/ 98   Word 55/1 09  50/1 20   R/R  26/26/1 23  26/26/1 21  L/G   25/25/1 08  28/28/1 00  Fast 1sec  70/81/ 69    A 58/1 03  69/ 87  Left 77/ 78  Word  60/1 00  Math  48/49/1 16 miss 1  55/56 1 03  R/R   25/25/1 24  L/G  26/26/1 08  Fast  49/73/ 72      A 63/ 95  Math 40/43/1 26  41/44 1 22  Word   42/1 43  48/1 25  R/R   27/27/ 97  L/G   27/27 1 23      Various nuts and bolts               beans off table thumb->IF,MF,RF,SF           MRMT place one row            MRMT turn 1 row           lacing beads             brain game            flex bar  Wrist flex and ext  Sup/pro  Master mind  Green  25/25  25/25  Green  25/25  25/25  Green  25/25  25/25  Green  20/20    20/20      Rush hour    16,21 stand on [employer] ball    card 7,12      [facility]  25,26,61

## 2019-12-19 ENCOUNTER — OFFICE VISIT (OUTPATIENT)
Dept: OCCUPATIONAL THERAPY | Facility: CLINIC | Age: 44
End: 2019-12-19
Payer: COMMERCIAL

## 2019-12-19 DIAGNOSIS — I61.9 NONTRAUMATIC INTRACEREBRAL HEMORRHAGE, UNSPECIFIED CEREBRAL LOCATION, UNSPECIFIED LATERALITY (HCC): Primary | ICD-10-CM

## 2019-12-19 DIAGNOSIS — I61.9 INTRAPARENCHYMAL HEMORRHAGE OF BRAIN (HCC): ICD-10-CM

## 2019-12-19 PROCEDURE — 97110 THERAPEUTIC EXERCISES: CPT

## 2019-12-19 PROCEDURE — 97530 THERAPEUTIC ACTIVITIES: CPT

## 2019-12-19 NOTE — PROGRESS NOTES
Daily Note     Today's date: 2019  Patient name: Melida Real  : 1975  MRN: [id number]  Referring provider: Kishan Medina MD  Dx:   Encounter Diagnosis     ICD-10-CM    1  Nontraumatic intracerebral hemorrhage, unspecified cerebral location, unspecified laterality ([facility] [address] ) I61 9    2  Intraparenchymal hemorrhage of brain (HCC) I61 9                   Subjective: I feel like my word finding is getting worse  Objective: See treatment diary below      Assessment: Tolerated treatment well  Patient demonstrated mod difficulty with divided attention task requiring 3 steps  difficulty with word finding noted  Plan: Continue per plan of care  Progress treatment as tolerated         Precautions: N/A         Manual                                                                                          Exercise Diary  19   T-putty  Without objects     With objects        REASSESSMENT   Digiflex    indiv digits Blue  30  30 Blue  30  30   Metal Gripper  35# 30 Metal Gripper  35# 30    Irena Torres balls      cwand ccw 1 min     FMC  Grooved pegs  Rice w/ objects  Beans w/ tweezers  Nuts and bolts          L 42                1 min 53 sec           Trampoline w/ medicine ball  Level 5 green ball   10 min stand on bosu ball  doing scategories   stand on left foot on aerofoam mat, red ball doing scategories      Sensory sticks  Palm/dorsal left hand             UBC  10 min   10 min 10 min     Body blade             Catching ball             T-band   Row   Ext   shld FF  ABD  ER  IR  Tri  bi    Black  20  20         10   10 [place]  20# 30  20# 30      6# 30  10# 30  25# 30  30# 30  Rosaura  30# 30  20# 30      6# 30  10# 30  25# 30  30# 30     dynavision board  A 57/1 05  64/ 94  Left 69/ 87  Math 51/52 1 11 miss 1  60/60/ 98   Word 55/1 09  50/1 20   R/R  / 23  / 21  L/G    08  / 00  Fast 1sec  70/81/ 69    A 58/1 03  69 87  Left 77/ 78  Word  60/1 00  Math  48/49/1 16 miss 1  55/56 1 03  R/R   25/25/1 24  L/G  26/26/1 08  Fast  49/73/ 72           Various nuts and bolts               beans off table thumb->IF,MF,RF,SF           MRMT place one row            MRMT turn 1 row           lacing beads             brain game       40 min     flex bar  Wrist flex and ext  Sup/pro  Master mind  Green  25/25  25/25  Green  25/25 25/25  Green  25/25 25/25 Green  30/30  30/30      Rush hour    16,21 stand on [employer] ball         [employer]  53,53,56

## 2019-12-20 ENCOUNTER — TRANSCRIBE ORDERS (OUTPATIENT)
Dept: NEUROLOGY | Facility: CLINIC | Age: 44
End: 2019-12-20

## 2019-12-20 DIAGNOSIS — G43.909 MIGRAINE, UNSPECIFIED, NOT INTRACTABLE, WITHOUT STATUS MIGRAINOSUS: ICD-10-CM

## 2019-12-20 DIAGNOSIS — R53.1 WEAKNESS: ICD-10-CM

## 2019-12-20 DIAGNOSIS — I72.6 ANEURYSM OF VERTEBRAL ARTERY (HCC): ICD-10-CM

## 2019-12-20 DIAGNOSIS — I10 ESSENTIAL (PRIMARY) HYPERTENSION: ICD-10-CM

## 2019-12-20 DIAGNOSIS — I61.9 NONTRAUMATIC INTRACEREBRAL HEMORRHAGE, UNSPECIFIED (HCC): Primary | ICD-10-CM

## 2019-12-23 ENCOUNTER — OFFICE VISIT (OUTPATIENT)
Dept: OCCUPATIONAL THERAPY | Facility: CLINIC | Age: 44
End: 2019-12-23
Payer: COMMERCIAL

## 2019-12-23 DIAGNOSIS — I61.9 INTRAPARENCHYMAL HEMORRHAGE OF BRAIN (HCC): ICD-10-CM

## 2019-12-23 DIAGNOSIS — I61.9 NONTRAUMATIC INTRACEREBRAL HEMORRHAGE, UNSPECIFIED CEREBRAL LOCATION, UNSPECIFIED LATERALITY (HCC): Primary | ICD-10-CM

## 2019-12-23 PROCEDURE — 97110 THERAPEUTIC EXERCISES: CPT

## 2019-12-23 PROCEDURE — 97112 NEUROMUSCULAR REEDUCATION: CPT

## 2019-12-23 NOTE — PROGRESS NOTES
Daily Note     Today's date: 2019  Patient name: Pillo Brunson  : 1975  MRN: [id number]  Referring provider: Austin Segundo MD  Dx:   Encounter Diagnosis     ICD-10-CM    1  Nontraumatic intracerebral hemorrhage, unspecified cerebral location, unspecified laterality ([facility] [address] ) I61 9    2  Intraparenchymal hemorrhage of brain (HCC) I61 9                   Subjective: I wrapped 1 present  Objective: See treatment diary below      Assessment: Tolerated treatment well  Patient exhibited good technique with therapeutic exercises and would benefit from continued OT  Plan: Continue per plan of care  Progress treatment as tolerated         Precautions: N/A         Manual                                                                                          Exercise Diary  19   T-putty  Without objects     With objects          Digiflex    indiv digits Blue  30  30 Blue  30  30   Metal Gripper  35# 30 Metal Gripper  35# 30 Metal Gripper  35# 30   Irena Torres balls      cwand ccw 1 min  Cw/ccw  1 min   FMC  Grooved pegs  Rice w/ objects  Beans w/ tweezers  Nuts and bolts          L 42                1 min 53 sec           Trampoline w/ medicine ball  Level 5 green ball   10 min stand on bosu ball  doing scategories   stand on left foot on aerofoam mat, red ball doing scategories      Sensory sticks  Palm/dorsal left hand             UBC  10 min   10 min 10 min  10 min   Body blade             Catching ball             T-band   Row   Ext   shld FF  ABD  ER  IR  Tri  bi    Black  20  20         10   10 [place]  20# 30  20# 30      6# 30  10# 30  25# 30  30# 30  Rosaura  30# 30  20# 30      6# 30  10# 30  25# 30  30# 30 Rosaura  30# 30  25# 30      7# 30  10# 30  25# 30  30# 30   dynavision board  A 57/1 05  64/ 94  Left 69/ 87  Math 51/52 1 11 miss 1  60/60/ 98   Word 55/1 09  50/1 20   R/R   23   21  L/G    08   00  Fast 1sec  70/81/ 69  A 58/1 03  69/ 87  Left 77/ 78  Word  60/1 00  Math  48/49/1 16 miss 1  55/56 1 03  R/R   25/25/1 24  L/G  26/26/1 08  Fast  49/73/ 72       A 62/ 97  67/ 90  Word  53/1 03  R 28/28/1 00  G 30/30 1 06  Math 49/50/1 15  54/55/1 04  Fast 69/81/ 69      Various nuts and bolts               beans off table thumb->IF,MF,RF,SF           MRMT place one row            MRMT turn 1 row           lacing beads             brain game       40 min     flex bar  Wrist flex and ext  Sup/pro  Master mind  Green  25/25  25/25  Green  25/25  25/25  Green  25/25  25/25 Green  30/30  30/30  Green  30/30  30/30    Rush hour    16,21 stand on [employer] ball         [facility]  57,01,71

## 2019-12-26 ENCOUNTER — APPOINTMENT (OUTPATIENT)
Dept: OCCUPATIONAL THERAPY | Facility: CLINIC | Age: 44
End: 2019-12-26
Payer: COMMERCIAL

## 2019-12-27 ENCOUNTER — OFFICE VISIT (OUTPATIENT)
Dept: OCCUPATIONAL THERAPY | Facility: CLINIC | Age: 44
End: 2019-12-27
Payer: COMMERCIAL

## 2019-12-27 DIAGNOSIS — I61.9 INTRAPARENCHYMAL HEMORRHAGE OF BRAIN (HCC): ICD-10-CM

## 2019-12-27 DIAGNOSIS — I61.9 NONTRAUMATIC INTRACEREBRAL HEMORRHAGE, UNSPECIFIED CEREBRAL LOCATION, UNSPECIFIED LATERALITY (HCC): Primary | ICD-10-CM

## 2019-12-27 PROCEDURE — 97110 THERAPEUTIC EXERCISES: CPT

## 2019-12-27 PROCEDURE — 97112 NEUROMUSCULAR REEDUCATION: CPT

## 2019-12-27 NOTE — PROGRESS NOTES
Daily Note     Today's date: 2019  Patient name: Binnie Duane  : 1975  MRN: [id number]  Referring provider: Hannah Yang MD  Dx:   Encounter Diagnosis     ICD-10-CM    1  Nontraumatic intracerebral hemorrhage, unspecified cerebral location, unspecified laterality ([facility] [address] ) I61 9    2  Intraparenchymal hemorrhage of brain (HCC) I61 9                   Subjective: This balance and multitasking is tough  Objective: See treatment diary below      Assessment: Tolerated treatment well  Patient demonstrated fatigue post treatment and would benefit from continued OT  Demonstrated F- dynamic standing balance, standing on L foot on aeromat  Plan: Continue per plan of care  Progress treatment as tolerated         Precautions: N/A         Manual                                                                                          Exercise Diary  19   T-putty  Without objects     With objects          Digiflex    indiv digits Metal Gripper  35# 30 Blue  30  30   Metal Gripper  35# 30 Metal Gripper  35# 30 Metal Gripper  35# 30   Irena Torres balls      cwand ccw 1 min  Cw/ccw  1 min   FMC  Grooved pegs  Rice w/ objects  Beans w/ tweezers  Nuts and bolts      1 min 42 sec                    1 min 53 sec           Trampoline w/ medicine ball  Level 5 green ball 10 min  Stand on left foot on aerofoam mat, red ball doing scategories  10 min stand on bosu ball  doing scategories   stand on left foot on aerofoam mat, red ball doing scategories      Sensory sticks  Palm/dorsal left hand             UBC  10 min   10 min 10 min  10 min   Body blade             Catching ball             T-band   Row   Ext   shld FF  ABD  ER  IR  Tri  bi Rosaura  30# 30  25# 30  Blue x30  Blue x30  7# 30  10# 30  30# 30  30# 30  Black  20  20         10   10 [place]  20# 30  20# 30      6# 30  10# 30  25# 30  30# 30  Rosaura  30# 30  20# 30      6# 30  10# 30  25# 30  30# 30 Rosaura  30# 30  25# 30      7# 30  10# 30  25# 30  30# 30   dynavision board      A 58/1 03  69/ 87  Left 77/ 78  Word  60/1 00  Math  48/49/1 16 miss 1  55/56 1 03  R/R   25/25/1 24  L/G  26/26/1 08  Fast  49/73/ 72       A 62/ 97  67/ 90  Word  53/1 03  R 28/28/1 00  G 30/30 1 06  Math 49/50/1 15  54/55/1 04  Fast 69/81/ 69      Various nuts and bolts               beans off table thumb->IF,MF,RF,SF           MRMT place one row            MRMT turn 1 row           lacing beads             brain game       40 min     flex bar  Wrist flex and ext  Sup/pro  Master mind  Green  25/25  25/25  Green  25/25  25/25  Green  25/25  25/25 Green  30/30  30/30  Green  30/30  30/30    Rush hour    16,21 stand on [email or web address]

## 2019-12-31 ENCOUNTER — APPOINTMENT (OUTPATIENT)
Dept: CT IMAGING | Facility: HOSPITAL | Age: 44
End: 2019-12-31
Attending: FAMILY MEDICINE
Payer: COMMERCIAL

## 2019-12-31 ENCOUNTER — HOSPITAL ENCOUNTER (OUTPATIENT)
Dept: MRI IMAGING | Facility: HOSPITAL | Age: 44
Discharge: HOME | End: 2019-12-31
Attending: FAMILY MEDICINE
Payer: COMMERCIAL

## 2019-12-31 DIAGNOSIS — I61.9 NONTRAUMATIC INTRACEREBRAL HEMORRHAGE, UNSPECIFIED CEREBRAL LOCATION, UNSPECIFIED LATERALITY (HCC): ICD-10-CM

## 2019-12-31 PROCEDURE — A9585 GADOBUTROL INJECTION: HCPCS | Performed by: FAMILY MEDICINE

## 2019-12-31 PROCEDURE — 70553 MRI BRAIN STEM W/O & W/DYE: CPT

## 2019-12-31 RX ADMIN — GADOBUTROL 7 ML: 604.72 INJECTION INTRAVENOUS at 10:53

## 2020-01-02 ENCOUNTER — HOSPITAL ENCOUNTER (OUTPATIENT)
Dept: CT IMAGING | Facility: HOSPITAL | Age: 45
Discharge: HOME | End: 2020-01-02
Attending: FAMILY MEDICINE
Payer: COMMERCIAL

## 2020-01-02 DIAGNOSIS — I61.9 NONTRAUMATIC INTRACEREBRAL HEMORRHAGE, UNSPECIFIED CEREBRAL LOCATION, UNSPECIFIED LATERALITY (HCC): ICD-10-CM

## 2020-01-02 PROCEDURE — 70496 CT ANGIOGRAPHY HEAD: CPT

## 2020-01-02 PROCEDURE — 70498 CT ANGIOGRAPHY NECK: CPT

## 2020-01-02 RX ADMIN — IOHEXOL 85 ML: 350 INJECTION, SOLUTION INTRAVENOUS at 09:39

## 2020-01-03 ENCOUNTER — OFFICE VISIT (OUTPATIENT)
Dept: NEUROLOGY | Facility: CLINIC | Age: 45
End: 2020-01-03
Payer: COMMERCIAL

## 2020-01-03 VITALS
DIASTOLIC BLOOD PRESSURE: 88 MMHG | HEIGHT: 67 IN | BODY MASS INDEX: 22.82 KG/M2 | WEIGHT: 145.4 LBS | RESPIRATION RATE: 18 BRPM | HEART RATE: 96 BPM | SYSTOLIC BLOOD PRESSURE: 132 MMHG

## 2020-01-03 DIAGNOSIS — I10 ESSENTIAL (PRIMARY) HYPERTENSION: ICD-10-CM

## 2020-01-03 DIAGNOSIS — I72.6 ANEURYSM OF VERTEBRAL ARTERY (HCC): ICD-10-CM

## 2020-01-03 DIAGNOSIS — I61.9 INTRAPARENCHYMAL HEMORRHAGE OF BRAIN (HCC): Primary | ICD-10-CM

## 2020-01-03 DIAGNOSIS — I72.6 VERTEBRAL ARTERY ANEURYSM (HCC): ICD-10-CM

## 2020-01-03 DIAGNOSIS — I61.9 NONTRAUMATIC INTRACEREBRAL HEMORRHAGE, UNSPECIFIED (HCC): ICD-10-CM

## 2020-01-03 DIAGNOSIS — R53.1 WEAKNESS: ICD-10-CM

## 2020-01-03 DIAGNOSIS — G43.909 MIGRAINE, UNSPECIFIED, NOT INTRACTABLE, WITHOUT STATUS MIGRAINOSUS: ICD-10-CM

## 2020-01-03 PROCEDURE — 99214 OFFICE O/P EST MOD 30 MIN: CPT | Performed by: PSYCHIATRY & NEUROLOGY

## 2020-01-03 NOTE — ASSESSMENT & PLAN NOTE
Follow-up head and neck CTA on January 2nd failed to reveal any significant vascular pathology or make comment as to any vertebral out pouching or aneurysm  --will be addressed further at his Neurosurgical follow-up on January 24th

## 2020-01-03 NOTE — PATIENT INSTRUCTIONS
Continue to work closely with Dr Robles Standing regarding ongoing blood pressure control  Do not forget your neurosurgery follow-up on January 24th

## 2020-01-03 NOTE — ASSESSMENT & PLAN NOTE
Felt to be on a hypertensive basis  In a characteristic location for hypertensive origin  Follow-up MRI brain demonstrates the expected evolutionary changes of his past hemorrhage  Follow-up CTA demonstrates no evident causal vascular pathology  Has been making significant strides of improvement with at the present time only very mild lateralized findings on examination  --will continue to work aggressively with his primary care provider, Dr Monique Nguyen, for appropriate blood pressure control   --will continue his daily 81 mg aspirin  --will continue for now his work with occupational therapy  --he has been told not to drive  He would like to do so  For clearance purposes, I have referred him to Occupational therapy for the [employer] to [employer] evaluation

## 2020-01-03 NOTE — PROGRESS NOTES
Patient ID: Mile Rojas is a 45 y o  male  Assessment/Plan:    Intraparenchymal hemorrhage of brain ([address] )  [place] to be on a hypertensive basis  In a characteristic location for hypertensive origin  Follow-up MRI brain demonstrates the expected evolutionary changes of his past hemorrhage  Follow-up CTA demonstrates no evident causal vascular pathology  Has been making significant strides of improvement with at the present time only very mild lateralized findings on examination  --will continue to work aggressively with his primary care provider, Dr Oswaldo Ross, for appropriate blood pressure control   --will continue his daily 81 mg aspirin  --will continue for now his work with occupational therapy  --he has been told not to drive  He would like to do so  For clearance purposes, I have referred him to Occupational therapy for the Fitness to Drive evaluation  Vertebral artery aneurysm [facility])  Follow-up head and neck CTA on January 2nd failed to reveal any significant vascular pathology or make comment as to any vertebral out pouching or aneurysm  --will be addressed further at his Neurosurgical follow-up on January 24th  I spent a total of 35 min with the patient with greater than 50% of that time spent counseling and coordinating his care, specifically discussing his diagnosis, the results of his follow-up testing,, and discussing the case with he and his mother, as detailed above  He will follow up with Neurology in 3 months or as needed  Subjective:    HPI  Patient, 45years of age and right-handed, presents for hospital follow-up after an admission on October 14, 2020  He was accompanied today by his mother Mulu Braun  As a background, patient presented on October 14th to the [facility] with acute onset left face and arm weakness  CT brain at that time demonstrated the presence of a right basal ganglia hemorrhage    CTA head and neck was at that time unrevealing as to any specific causal vascular pathology  The hemorrhage was felt to represent a nontraumatic hemorrhage based in hypertension  He was transferred to the [facility] and subsequently to rehab  He has done very well  He has been discharged from physical therapy and speech therapy, and continues an occupational therapy  He feels that he is pretty much back to his previous motor baseline although does tend fatigue with the left upper extremity sooner than right  He has had no issues post discharge to suggest any recurrent events  He is working with his primary care provider for appropriate blood pressure control  His neck CTA a did suggest an outpouching of the right vertebral artery  Neurosurgery commented as to a possible right vertebral aneurysm  It was suggested that he begin an 81 mg aspirin daily which she has done  He did have follow-up brain MRI performed on December 31st and a follow-up head neck CTA done on January 2nd  The brain MRI demonstrated the expected evolution of his hemorrhage  The head neck CTA demonstrated no vascular pathology  No comment was made as to any obvious vertebral artery out patching or aneurysm  However, he is scheduled to see Neurosurgery in follow-up on January 24th          Past Medical History:   Diagnosis Date    Depression     Hypertension     Migraine     Stroke [facility])      Past Surgical History:   Procedure Laterality Date    LITHOTRIPSY       Social History     Socioeconomic History    Marital status: Single     Spouse name: None    Number of children: None    Years of education: None    Highest education level: None   Occupational History    None   Social Needs    Financial resource strain: None    Food insecurity:     Worry: None     Inability: None    Transportation needs:     Medical: None     Non-medical: None   Tobacco Use    Smoking status: Never Smoker    Smokeless tobacco: Never Used   Substance and Sexual Activity    Alcohol use: Not Currently    Drug use: Never    Sexual activity: Not Currently   Lifestyle    Physical activity:     Days per week: None     Minutes per session: None    Stress: None   Relationships    Social connections:     Talks on phone: None     Gets together: None     Attends Judaism service: None     Active member of club or organization: None     Attends meetings of clubs or organizations: None     Relationship status: None    Intimate partner violence:     Fear of current or ex partner: None     Emotionally abused: None     Physically abused: None     Forced sexual activity: None   Other Topics Concern    None   Social History Narrative    None     Family History   Problem Relation Age of Onset    Atrial fibrillation Mother     Asthma Mother     Anxiety disorder Father      Allergies   Allergen Reactions    Other      cats       Current Outpatient Medications:     amLODIPine (NORVASC) 10 mg tablet, Take 1 tablet (10 mg total) by mouth daily (Patient taking differently: Take 5 mg by mouth daily ), Disp: 30 tablet, Rfl: 0    aspirin (ECOTRIN LOW STRENGTH) 81 mg EC tablet, Take 1 tablet (81 mg total) by mouth daily, Disp: 30 tablet, Rfl: 0    lisinopril (ZESTRIL) 20 mg tablet, Take 1 tablet (20 mg total) by mouth daily, Disp: 30 tablet, Rfl: 0    metoclopramide (REGLAN) 10 mg tablet, Take 1 tablet (10 mg total) by mouth 2 (two) times a day as needed (for headache) Not to be used more than 2 days per week , Disp: 15 tablet, Rfl: 0    nortriptyline (PAMELOR) 10 mg capsule, Take 1 capsule (10 mg total) by mouth daily at bedtime (Patient not taking: Reported on 1/3/2020), Disp: 30 capsule, Rfl: 0    Objective:    Blood pressure 132/88, pulse 96, resp  rate 18, height 5' 7" (1 702 m), weight 66 kg (145 lb 6 4 oz)  Physical Exam  Head normocephalic  Eyes nonicteric  No audible head, ocular or anterior neck bruits  Lungs clear to auscultation  Rhythm regular  GI (abdomen) soft nontender  Bowel sounds present    No significant lower extremity edema  Neurological Exam  Alert  Pleasantly interactive  Fully oriented  No dysarthria of consequence  No obvious symbolic language difficulties in general conversation  Unremarkable spontaneous gait  Able to heel and toe stand bilaterally  Able to tandem walk  Romberg maneuver performed unremarkably  Cranial Nerves:   I: Olfactory sense intact bilaterally  II: Visual fields full to confrontation  Pupils equal, round, reactive to light with normal accomodation  Fundus: with bilaterally marginated discs  III,IV,VI: Extraocular muscles EOMI, no nystagmus  V: Masseter and pterygoid strength  Sensation in the V1 through V3 distributions intact to pinprick and light touch bilaterally  VII:  Left central facial weakness present  VIII: Audition intact to finger rub bilaterally  IX/X: Uvula midline  Soft palate elevation symmetric  XI: Trapezius and SCM strength 5/5 bilaterally  XII: Tongue midline with no atrophy or fasciculations with appropriate movement  Accurate with finger-to-nose and heel-to-shin maneuvers bilaterally  With motor testing, did appear to have very, very subtle testable global weakness left upper extremity  No evident testable weakness left lower extremity  Excellent testable strength throughout both the right upper and right lower extremities  No drift apparent  Sensory testing grossly intact to pin, position and vibration  No extinction with double simultaneous stimulation  Muscle stretch reflexes 1 throughout the right upper extremity, 1+ throughout the left upper extremity, 1 at the right knee, 1+ at the left knee, 2 at the right ankle and 2+ at the left ankle  Toe response on right downgoing  Toe response on left upgoing  ROS:    Review of Systems   Constitutional: Negative  Negative for appetite change and fever  HENT: Positive for tinnitus  Negative for hearing loss, trouble swallowing and voice change  Eyes: Negative  Negative for photophobia and pain  Respiratory: Negative  Negative for shortness of breath  Cardiovascular: Negative  Negative for palpitations  Gastrointestinal: Positive for constipation  Negative for nausea and vomiting  Endocrine: Negative  Negative for cold intolerance and heat intolerance  Genitourinary: Negative  Negative for dysuria, frequency and urgency  Musculoskeletal: Negative  Negative for myalgias and neck pain  Skin: Negative  Negative for rash  Allergic/Immunologic: Negative  Neurological: Positive for light-headedness  Negative for dizziness, tremors, seizures, syncope, facial asymmetry, speech difficulty, weakness, numbness and headaches  Hematological: Negative  Does not bruise/bleed easily  Psychiatric/Behavioral: Negative for confusion, hallucinations and sleep disturbance  The patient is nervous/anxious  I personally reviewed the ROS as entered by the medical assistant  *Please note this document was created using voice recognition software and may contain sound-alike word errors  *

## 2020-01-03 NOTE — LETTER
January 3, 2020     MD Brandt Melton [address] [facility]    Patient: Jovita Lora   YOB: 1975   Date of Visit: 1/3/2020       Dear Dr Marylee Anchors: Thank you for referring Jovita Lora to me for evaluation  Below are my notes for this consultation  If you have questions, please do not hesitate to call me  I look forward to following your patient along with you  Sincerely,        Emily Freedman MD        CC: No Recipients  Emily Freedman MD  1/3/2020  3:40 PM  Sign at close encounter  Patient ID: Jovita Lora is a 45 y o  male  Assessment/Plan:    Intraparenchymal hemorrhage of brain ([address] )  [place] to be on a hypertensive basis  In a characteristic location for hypertensive origin  Follow-up MRI brain demonstrates the expected evolutionary changes of his past hemorrhage  Follow-up CTA demonstrates no evident causal vascular pathology  Has been making significant strides of improvement with at the present time only very mild lateralized findings on examination  --will continue to work aggressively with his primary care provider, Dr Marylee Anchors, for appropriate blood pressure control   --will continue his daily 81 mg aspirin  --will continue for now his work with occupational therapy  --he has been told not to drive  He would like to do so  For clearance purposes, I have referred him to Occupational therapy for the Fitness to Drive evaluation  Vertebral artery aneurysm [facility])  Follow-up head and neck CTA on January 2nd failed to reveal any significant vascular pathology or make comment as to any vertebral out pouching or aneurysm  --will be addressed further at his Neurosurgical follow-up on January 24th      I spent a total of 35 min with the patient with greater than 50% of that time spent counseling and coordinating his care, specifically discussing his diagnosis, the results of his follow-up testing,, and discussing the case with he and his mother, as detailed above  He will follow up with Neurology in 3 months or as needed  Subjective:    HPI  Patient, 45years of age and right-handed, presents for hospital follow-up after an admission on October 14, 2020  He was accompanied today by his mother Kwabenaece Vlad  As a background, patient presented on October 14th to the [facility] with acute onset left face and arm weakness  CT brain at that time demonstrated the presence of a right basal ganglia hemorrhage  CTA head and neck was at that time unrevealing as to any specific causal vascular pathology  The hemorrhage was felt to represent a nontraumatic hemorrhage based in hypertension  He was transferred to the [facility] and subsequently to rehab  He has done very well  He has been discharged from physical therapy and speech therapy, and continues an occupational therapy  He feels that he is pretty much back to his previous motor baseline although does tend fatigue with the left upper extremity sooner than right  He has had no issues post discharge to suggest any recurrent events  He is working with his primary care provider for appropriate blood pressure control  His neck CTA a did suggest an outpouching of the right vertebral artery  Neurosurgery commented as to a possible right vertebral aneurysm  It was suggested that he begin an 81 mg aspirin daily which she has done  He did have follow-up brain MRI performed on December 31st and a follow-up head neck CTA done on January 2nd  The brain MRI demonstrated the expected evolution of his hemorrhage  The head neck CTA demonstrated no vascular pathology  No comment was made as to any obvious vertebral artery out patching or aneurysm  However, he is scheduled to see Neurosurgery in follow-up on January 24th          Past Medical History:   Diagnosis Date    Depression     Hypertension     Migraine     Stroke [facility])      Past Surgical History:   Procedure Laterality Date    LITHOTRIPSY Social History     Socioeconomic History    Marital status: Single     Spouse name: None    Number of children: None    Years of education: None    Highest education level: None   Occupational History    None   Social Needs    Financial resource strain: None    Food insecurity:     Worry: None     Inability: None    Transportation needs:     Medical: None     Non-medical: None   Tobacco Use    Smoking status: Never Smoker    Smokeless tobacco: Never Used   Substance and Sexual Activity    Alcohol use: Not Currently    Drug use: Never    Sexual activity: Not Currently   Lifestyle    Physical activity:     Days per week: None     Minutes per session: None    Stress: None   Relationships    Social connections:     Talks on phone: None     Gets together: None     Attends Protestant service: None     Active member of club or organization: None     Attends meetings of clubs or organizations: None     Relationship status: None    Intimate partner violence:     Fear of current or ex partner: None     Emotionally abused: None     Physically abused: None     Forced sexual activity: None   Other Topics Concern    None   Social History Narrative    None     Family History   Problem Relation Age of Onset    Atrial fibrillation Mother     Asthma Mother     Anxiety disorder Father      Allergies   Allergen Reactions    Other      cats       Current Outpatient Medications:     amLODIPine (NORVASC) 10 mg tablet, Take 1 tablet (10 mg total) by mouth daily (Patient taking differently: Take 5 mg by mouth daily ), Disp: 30 tablet, Rfl: 0    aspirin (ECOTRIN LOW STRENGTH) 81 mg EC tablet, Take 1 tablet (81 mg total) by mouth daily, Disp: 30 tablet, Rfl: 0    lisinopril (ZESTRIL) 20 mg tablet, Take 1 tablet (20 mg total) by mouth daily, Disp: 30 tablet, Rfl: 0    metoclopramide (REGLAN) 10 mg tablet, Take 1 tablet (10 mg total) by mouth 2 (two) times a day as needed (for headache) Not to be used more than 2 days per week , Disp: 15 tablet, Rfl: 0    nortriptyline (PAMELOR) 10 mg capsule, Take 1 capsule (10 mg total) by mouth daily at bedtime (Patient not taking: Reported on 1/3/2020), Disp: 30 capsule, Rfl: 0    Objective:    Blood pressure 132/88, pulse 96, resp  rate 18, height 5' 7" (1 702 m), weight 66 kg (145 lb 6 4 oz)  Physical Exam  Head normocephalic  Eyes nonicteric  No audible head, ocular or anterior neck bruits  Lungs clear to auscultation  Rhythm regular  GI (abdomen) soft nontender  Bowel sounds present  No significant lower extremity edema  Neurological Exam  Alert  Pleasantly interactive  Fully oriented  No dysarthria of consequence  No obvious symbolic language difficulties in general conversation  Unremarkable spontaneous gait  Able to heel and toe stand bilaterally  Able to tandem walk  Romberg maneuver performed unremarkably  Cranial Nerves:   I: Olfactory sense intact bilaterally  II: Visual fields full to confrontation  Pupils equal, round, reactive to light with normal accomodation  Fundus: with bilaterally marginated discs  III,IV,VI: Extraocular muscles EOMI, no nystagmus  V: Masseter and pterygoid strength  Sensation in the V1 through V3 distributions intact to pinprick and light touch bilaterally  VII:  Left central facial weakness present  VIII: Audition intact to finger rub bilaterally  IX/X: Uvula midline  Soft palate elevation symmetric  XI: Trapezius and SCM strength 5/5 bilaterally  XII: Tongue midline with no atrophy or fasciculations with appropriate movement  Accurate with finger-to-nose and heel-to-shin maneuvers bilaterally  With motor testing, did appear to have very, very subtle testable global weakness left upper extremity  No evident testable weakness left lower extremity  Excellent testable strength throughout both the right upper and right lower extremities  No drift apparent    Sensory testing grossly intact to pin, position and vibration  No extinction with double simultaneous stimulation  Muscle stretch reflexes 1 throughout the right upper extremity, 1+ throughout the left upper extremity, 1 at the right knee, 1+ at the left knee, 2 at the right ankle and 2+ at the left ankle  Toe response on right downgoing  Toe response on left upgoing  ROS:    Review of Systems   Constitutional: Negative  Negative for appetite change and fever  HENT: Positive for tinnitus  Negative for hearing loss, trouble swallowing and voice change  Eyes: Negative  Negative for photophobia and pain  Respiratory: Negative  Negative for shortness of breath  Cardiovascular: Negative  Negative for palpitations  Gastrointestinal: Positive for constipation  Negative for nausea and vomiting  Endocrine: Negative  Negative for cold intolerance and heat intolerance  Genitourinary: Negative  Negative for dysuria, frequency and urgency  Musculoskeletal: Negative  Negative for myalgias and neck pain  Skin: Negative  Negative for rash  Allergic/Immunologic: Negative  Neurological: Positive for light-headedness  Negative for dizziness, tremors, seizures, syncope, facial asymmetry, speech difficulty, weakness, numbness and headaches  Hematological: Negative  Does not bruise/bleed easily  Psychiatric/Behavioral: Negative for confusion, hallucinations and sleep disturbance  The patient is nervous/anxious  I personally reviewed the ROS as entered by the medical assistant  *Please note this document was created using voice recognition software and may contain sound-alike word errors  *

## 2020-01-07 ENCOUNTER — OFFICE VISIT (OUTPATIENT)
Dept: OCCUPATIONAL THERAPY | Facility: CLINIC | Age: 45
End: 2020-01-07
Payer: COMMERCIAL

## 2020-01-07 DIAGNOSIS — I61.9 NONTRAUMATIC INTRACEREBRAL HEMORRHAGE, UNSPECIFIED CEREBRAL LOCATION, UNSPECIFIED LATERALITY (HCC): Primary | ICD-10-CM

## 2020-01-07 PROCEDURE — 97112 NEUROMUSCULAR REEDUCATION: CPT

## 2020-01-07 PROCEDURE — 97110 THERAPEUTIC EXERCISES: CPT

## 2020-01-07 NOTE — PROGRESS NOTES
Daily Note     Today's date: 2020  Patient name: Leopold Chinchilla  : 1975  MRN: [id number]  Referring provider: Rosa Quintana MD  Dx:   Encounter Diagnosis     ICD-10-CM    1  Nontraumatic intracerebral hemorrhage, unspecified cerebral location, unspecified laterality ([facility] [address] ) [address] 9                   Subjective: I'm so frustrated with everything  Objective: See treatment diary below      Assessment: Tolerated treatment well  Patient exhibited good technique with therapeutic exercises and would benefit from continued OT  Pt expressing frustration with inability to drive and having to depend on other people, pt wanting to return to work and just get his life back  Pt scheduled for fit to drive evaluation   Plan: Continue per plan of care  Progress treatment as tolerated         Precautions: N/A         Manual                                                                                          Exercise Diary  19   T-putty  Without objects     With objects          Digiflex    indiv digits Metal Gripper  35# 30 Metal Gripper  50# 30   Metal Gripper  35# 30 Metal Gripper  35# 30 Metal Gripper  35# 30   Irena Torres balls      cwand ccw 1 min  Cw/ccw  1 min   FMC  Grooved pegs  Rice w/ objects  Beans w/ tweezers  Nuts and bolts      1 min 42 sec         1 min 40 sec    L 52 beans  57 beans       1 min 53 sec           Trampoline w/ medicine ball  Level 5 green ball 10 min  Stand on left foot on aerofoam mat, red ball doing scategories  10 min stand on bosu ball  doing scategories   stand on left foot on aerofoam mat, red ball doing scategories      Sensory sticks  Palm/dorsal left hand             UBC  10 min 10 min 60 resistance  10 min 10 min  10 min   Body blade             Catching ball             T-band   Row   Ext   shld FF  ABD  ER  IR  Tri  bi [place]  30# 30  25# 30  Blue x30  Blue x30  7# 30  10# 30  30# 30  30# 30 ariadna  30# 30  25# 30  5# 30    8# 30  11# 30  30# 30  30# 27 Rosaura  20# 30  20# 30      6# 30  10# 30  25# 30  30# 30  Rosaura  30# 30  20# 30      6# 30  10# 30  25# 30  30# 30 [place]  30# 30  25# 30      7# 30  10# 30  25# 30  30# 30   dynavision board             A 62/ 97  67/ 90  Word  53/1 03  R 28/28/1 00  G 30/30 1 06  Math 49/50/1 15  54/55/1 04  Fast 69/81/ 69      Various nuts and bolts               beans off table thumb->IF,MF,RF,SF           MRMT place one row            MRMT turn 1 row           lacing beads             brain game       40 min     flex bar  Wrist flex and ext  Sup/pro  Master mind  Green  25/25  25/25  Green  30/30  30/30  Green  25/25  25/25 Green  30/30  30/30  Green  30/30  30/30    [place] hour

## 2020-01-09 ENCOUNTER — APPOINTMENT (OUTPATIENT)
Dept: OCCUPATIONAL THERAPY | Facility: CLINIC | Age: 45
End: 2020-01-09
Payer: COMMERCIAL

## 2020-01-14 ENCOUNTER — OFFICE VISIT (OUTPATIENT)
Dept: OCCUPATIONAL THERAPY | Facility: CLINIC | Age: 45
End: 2020-01-14
Payer: COMMERCIAL

## 2020-01-14 DIAGNOSIS — I61.9 NONTRAUMATIC INTRACEREBRAL HEMORRHAGE, UNSPECIFIED CEREBRAL LOCATION, UNSPECIFIED LATERALITY (HCC): Primary | ICD-10-CM

## 2020-01-14 PROCEDURE — 97112 NEUROMUSCULAR REEDUCATION: CPT

## 2020-01-14 PROCEDURE — 97110 THERAPEUTIC EXERCISES: CPT

## 2020-01-14 NOTE — PROGRESS NOTES
Daily Note     Today's date: 2020  Patient name: Jessica Fajardo  : 1975  MRN: [id number]   Referring provider: Josefa Yates MD  Dx:   Encounter Diagnosis     ICD-10-CM    1  Nontraumatic intracerebral hemorrhage, unspecified cerebral location, unspecified laterality ([facility] [address] ) I61 9                   Subjective: They changed my meds again, I think it's making me feel a little off balance  Objective: See treatment diary below      Assessment: Tolerated treatment well  Patient demonstrated fatigue post treatment, exhibited good technique with therapeutic exercises and would benefit from continued OT  Plan: Continue per plan of care  Progress treatment as tolerated         Precautions: N/A         Manual                                                                                          Exercise Diary  19   T-putty  Without objects     With objects          Digiflex    indiv digits Metal Gripper  35# 30 Metal Gripper  50# 30   Metal Gripper  50# 30 Metal Gripper  35# 30 Metal Gripper  35# 30   Irena Torres balls       Cw/ccw  1 min   FMC  Grooved pegs  Rice w/ objects  Beans w/ tweezers  Nuts and bolts      1 min 42 sec         1 min 40 sec    L 52 beans  57 beans                Trampoline w/ medicine ball  Level 5 green ball 10 min  Stand on left foot on aerofoam mat, red ball doing scategories  10 min stand on bosu ball  doing scategories   stand on left foot on aerofoam mat, red ball doing scategories      Sensory sticks  Palm/dorsal left hand             UBC  10 min 10 min 60 resistance  10 min  55 resistance 10 min  10 min   Body blade             Catching ball             T-band   Row   Ext   shld FF  ABD  ER  IR  Tri  bi Rosaura  30# 30  25# 30  Blue x30  Blue x30  7# 30  10# 30  30# 30  30# 30 rosaura  30# 30  25# 30  5# 30    8# 30  11# 30  30# 30  30# 30 Rosaura  35# 30  28# 30      8# 30  11# 30  25# 30  30# 30  Rosaura  30# 30  20# 30      6# 30  10# 30  25# 30  30# 27 Rosaura  30# 30  25# 30      7# 30  10# 30  25# 30  30# 30   dynavision board             A 62/ 97  67/ 90  Word  53/1 03  R 28/28/1 00  G 30/30 1 06  Math 49/50/1 15  54/55/1 04  Fast 69/81/ 69      Various nuts and bolts               beans off table thumb->IF,MF,RF,SF     green putty search  7 pennies  4 beans  1 button      MRMT place one row       R 11 sec  L 13 sec     MRMT turn 1 row      R 20,16 sec  L 19     lacing beads             brain game       40 min     flex bar  Wrist flex and ext  Sup/pro  Master mind  Green  25/25  25/25  Green  30/30  30/30  Green  30/30  30/30 Green  30/30  30/30  Green  30/30  30/30    [facility]

## 2020-01-15 NOTE — PROGRESS NOTES
OT DISCHARGE    Today's date: 2020  Patient name: Jessica Fajardo  : 1975  MRN: [id number]  Referring provider: Josefa Yates MD  Dx:   Encounter Diagnosis     ICD-10-CM    1  Nontraumatic intracerebral hemorrhage, unspecified cerebral location, unspecified laterality [facility]) I61 9                   Assessment  Assessment details: Jessica Fajardo is a 45 y o  male with a diagnosis of hemorrhagic CVA  A moderate complexity evaluation was completed today  The patient is having difficulty with picking up small objects, reacting quickly with left hand, sensing hot/cold temperature, opening jar lids, and tolerating activity for more than 1 hour without fatigue   Findings show an impairment with strength, activity tolerance, and pain which limits completion of ADL's/IADL's, and recreational activities  Patient will benefit from OT services to reach goals  REASSESSMENT (19): Patient was seen for 10 treatment sessions  Patient reports improvement with opening jar lids, hot/cold sensation, picking up small objects, and activity tolerance  Patient reports independence with self-care tasks  Improvement noted with  and pinch strength, fine motor coordination, and   Patient admits that his attention skills need to be improved  Patient would benefit from continued OT services to reach goals  REASSESSMENT (20): Patient was seen for 18 treatment sessions  Patient reports improvement with opening jar lids, picking up small objects, activity tolerance when completing self-care and homemaking tasks  Improvement noted with  strength, shoulder strength, fine motor coordination, and sensation on left hand  Also, patient has improved reaction time when touching and reacting to lit buttons on the Dynavision board under 1 second  Patient is pleased with progress and has met most goals  D/C OT services at this time    Impairments: abnormal coordination, activity intolerance, impaired balance, impaired physical strength, lacks appropriate home exercise program and safety issue    Symptom irritability: moderateUnderstanding of Dx/Px/POC: good   Prognosis: good    Goals  STG's In 2 weeks:  1  Patient will increase strength of left  to 55# for lifting and carrying items  MET  2  Patient will demonstrate good carryover and independence with HEP  MET  3  Patient will improve sensitivity of numbness in left dorsum and palm of left hand by detecting the 4 56 monofilament with 80% accuracy  MET  LTG's In 4-6 weeks:  1  Patient will improve fine motor coordination by completing the 9 hole peg test with left hand <35 seconds  MET  2  Patient will increase strength of left  to 80#, pinch to 17#, left shld ABD/ER to 5/5 for lifting and carrying items  NOT MET  3  Patient will improve sensitivity of numbness in left dorsum and palm of left hand by detecting the 4 56 monofilament with 100% accuracy  MET  4  Patient will tolerate at least 3 hours of activity with occasional rest breaks and no reported or signs of fatigue  MET    ADDED GOALS (12/5/19):  1  Patient will detect the 2 83 monofilament on dorsum and palm of left hand with at least 80% accuracy  MET  2  Patient will improve activity tolerance with no rest breaks for 2 hours of therapy and reported no naps for the day  MET  3  Patient will improve attention skills by reaction time of <1 second by touching random lit buttons on [employer] board and completing math or reading activities   MET    Plan  Patient would benefit from: OT eval and skilled occupational therapy  Planned modality interventions: fluidotherapy  Planned therapy interventions: strengthening, therapeutic activities, therapeutic exercise, home exercise program, functional ROM exercises, patient education, fine motor coordination training, ADL retraining, manual therapy, self care and neuromuscular re-education  Frequency: 2x week  Duration in visits: 8  Duration in weeks: 4  Plan of Care beginning date: 2019  Plan of Care expiration date: 2020  Treatment plan discussed with: patient        Subjective Evaluation    History of Present Illness  Date of onset: 10/14/2019  Mechanism of injury: Intense HAs started 7 years ago, followed for awhile, tried medications  Tried to manage HAs medically, nothing really helped so he didn't take the meds  Patient voices that two days before he had his stroke, he felt dizzy  This had happened before due to dehydration  Then two days later he was lifting weights and didn't feel well  He noted numbness in left UE, difficulty with fine motor control  Patient reports she called his sister and she said he sounded normal   He still wasn't feeling right, drove himself to the ED  Patient had a CT at the hospital, (+) bleed  Taken via ambulance to [facility]  He was seen by neurosurgery and was deemed non-surgical at that point  He was admitted to the ICU for several days  Then ultimately transferred to ARU  He was there for approximately 1 5 weeks total   He was discharged this past Thursday  Quality of life: excellent    Pain  Current pain ratin  At best pain ratin  At worst pain ratin    Social Support  Lives with: parents    Employment status: not working  Hand dominance: right    Treatments  Current treatment: occupational therapy  Patient Goals  Patient goals for therapy: increased strength, return to sport/leisure activities and independence with ADLs/IADLs          Objective     Observations     Additional Observation Details  EVAL:Patient reported that his left hand felt as if it was burning when washing his hand with warm water this a m  Sensation on palm of hand 7/10 accuracy with 4 56 monofilament(impaired on distal palmar crease),  0/10 accuracy on dorsum of hand    REASSESSMENT on 20: 100% accuracy on dorsum and palm of hand with 4 56 and 4 31 monofilament   100% accuracy on left palm with 3 61 and 90% on dorsum of hand with 3 61 monofilament (impaired on middle dorsum of hand)  Right hand intact with 2 83 monofilament on palm of hand  The 3 61 filament indicates diminished light touch  Active Range of Motion   Left Shoulder   Normal active range of motion    Right Shoulder   Normal active range of motion    Left Elbow   Normal active range of motion    Right Elbow   Normal active range of motion    Left Wrist   Normal active range of motion    Right Wrist   Normal active range of motion    Left Thumb   Opposition: WFL's    Right Thumb   Opposition: WFL's    Passive Range of Motion     Additional Passive Range of Motion Details  9 hole peg test right hand: 30 sec  Left hand 25 SEC   (was 36 seconds)    Strength/Myotome Testing     Left Shoulder     Planes of Motion   Flexion: 4   Extension: 4   Abduction: 4+   Adduction: 5   External rotation at 0°: 4   Internal rotation at 0°: 5     Isolated Muscles   Biceps: 4+   Triceps: 4     Right Shoulder     Planes of Motion   Flexion: 5   Extension: 5   Abduction: 5   External rotation at 0°: 5   Internal rotation at 0°: 5     Isolated Muscles   Biceps: 5   Triceps: 5     Left Elbow   Flexion: 5  Extension: 5  Forearm supination: 5  Forearm pronation: 5    Right Elbow   Normal strength    Left Wrist/Hand   Wrist extension: 5  Wrist flexion: 5  Radial deviation: 5  Ulnar deviation: 5     (2nd hand position)     Trial 1: 75    Trial 2: 75    Trial 3: 75    Thumb Strength  Key/Lateral Pinch     Trail 1: 17 5  Tip/Two-Point Pinch     Trial 1: 11    Right Wrist/Hand   Wrist extension: 5  Wrist flexion: 5  Radial deviation: 5  Ulnar deviation: 5     (2nd hand position)     Trial 1: 95    Trial 2: 92    Thumb Strength   Key/Lateral Pinch     Trial 1: 19  Tip/Two-Point Pinch     Trial 1: 13             Precautions: N/A         Exercise Diary  12/27/19 1/7/20 1/14/20 1/16/20 12/23/19   T-putty  Without objects     With objects          Digiflex    indiv digits Metal Gripper  35# 30 Metal Gripper  50# 30   Metal Gripper  50# 30  Metal Gripper  35# 30   Irena Torres balls       Cw/ccw  1 min   FMC  Grooved pegs  Rice w/ objects  Beans w/ tweezers  Nuts and bolts      1 min 42 sec         1 min 40 sec    L 52 beans  57 beans                Trampoline w/ medicine ball  Level 5 green ball 10 min  Stand on left foot on aerofoam mat, red ball doing scategories  10 min stand on bosu ball  doing scategories   stand on left foot on aerofoam mat, red ball doing scategories      Sensory sticks  Palm/dorsal left hand             UBC  10 min 10 min 60 resistance  10 min  55 resistance   10 min   Body blade            Catching ball            T-band   Row   Ext   shld FF  ABD  ER  IR  Tri  bi [place]  30# 30  25# 30  Blue x30  Blue x30  7# 30  10# 30  30# 30  30# 30 ariadna  30# 30  25# 30  5# 30    8# 30  11# 30  30# 30  30# 30 [place]  35# 30  28# 30      8# 30  11# 30  25# 30  30# 30  green       10  10  10    10  10 [place]  30# 30  25# 30      7# 30  10# 30  25# 30  30# 30   dynavision board                 57/76/ 70      Math64/64/   92   A 62/ 97  67/ 90  Word  53/1 03  R 28/28/1 00  G 30/30 1 06  Math 49/50/1 15  54/55/1 04  Fast 69/81/ 69      Various nuts and bolts               beans off table thumb->IF,MF,RF,SF     green putty search  7 pennies  4 beans  1 button      MRMT place one row       R 11 sec  L 13 sec     MRMT turn 1 row      R 20,16 sec  L 19     lacing beads            brain game           flex bar  Wrist flex and ext  Sup/pro  Master mind  Green  25/25  25/25  Green  30/30  30/30  Green  30/30  30/30   Green  30/30  30/30    [facility]

## 2020-01-16 ENCOUNTER — EVALUATION (OUTPATIENT)
Dept: OCCUPATIONAL THERAPY | Facility: CLINIC | Age: 45
End: 2020-01-16
Payer: COMMERCIAL

## 2020-01-16 DIAGNOSIS — I61.9 NONTRAUMATIC INTRACEREBRAL HEMORRHAGE, UNSPECIFIED CEREBRAL LOCATION, UNSPECIFIED LATERALITY (HCC): Primary | ICD-10-CM

## 2020-01-16 PROCEDURE — 97110 THERAPEUTIC EXERCISES: CPT

## 2020-01-16 PROCEDURE — 97112 NEUROMUSCULAR REEDUCATION: CPT

## 2020-01-22 ENCOUNTER — OFFICE VISIT (OUTPATIENT)
Dept: OCCUPATIONAL THERAPY | Facility: CLINIC | Age: 45
End: 2020-01-22
Payer: COMMERCIAL

## 2020-01-22 DIAGNOSIS — I61.9 NONTRAUMATIC INTRACEREBRAL HEMORRHAGE, UNSPECIFIED CEREBRAL LOCATION, UNSPECIFIED LATERALITY (HCC): Primary | ICD-10-CM

## 2020-01-22 PROCEDURE — 97166 OT EVAL MOD COMPLEX 45 MIN: CPT | Performed by: OCCUPATIONAL THERAPIST

## 2020-01-22 PROCEDURE — 96125 COGNITIVE TEST BY HC PRO: CPT | Performed by: OCCUPATIONAL THERAPIST

## 2020-01-22 NOTE — PROGRESS NOTES
OCCUPATIONAL THERAPY FITNESS TO DRIVE EVALUATION:    01/22/2020  Karen Fret  1975  [id number]  Chalo Barr MD  No diagnosis found  Subjective Evaluation    Quality of life: good      SUBJECTIVE: "I want to get back to being productive"  PERSONAL GOAL: "I want to get back to driving local and highway distances"  HISTORY OF PRESENT ILLNESS:     Pt is a 45 y o  male who was referred to Occupational Therapy s/p Nontraumatic intracerebral hemorrhage  As per hospital reports, "Pt with PMH of chronic migraine headaches and depression treated with citalopram presents as a transfer [facility]14/[facility]  Patient originally presented for evaluation of acute left-sided facial weakness, left arm weakness  This occurred while lifting weights earlier that day  At the time, vital Signs significant for blood pressure of 208/129 otherwise stable  CT head demonstrated acute intraparenchymal hemorrhage centered at the right lentiform nucleus with surrounding vasogenic edema  NIH stroke scale score of 2, ICH score of 0  Patient was transferred to [facility] for management of intra parenchymal brain hemorrhage       Upon arrival, patient was alert and oriented x4 and saturating well on room air  Endorsed right eye pain and drowsiness; denied headaches, fevers, chills, vision changes, lightheadedness, dizziness, chest pain, and shortness of breath  Physical examination was significant for bilateral left visual field loss, complete loss of sensation over left face; left facial droop, inability to deviate tongue towards right side, flaccid paralysis of left upper extremity with complete loss of sensation, 3/5 left lower extremity extremity strength with complete loss of sensation, and left patellar hyperreflexia  Patient was subsequently started on nicardipine drip with Q1 neuro checks   Upon chart review, patient was also noted to have experienced suicidal ideation over the last three months, prompting inpatient psychiatry consultation and continuous 1-to-1 observation (seen and evaluate; 1-to-1 discontinued and started on zyprexa for headaches and depression)  Over the next two days patient exhibited progressive clinical improvement with nearly full recovery of strength in left upper and lower extremity, however repeat imaging indicated that initial hematoma had undergone interval increase in size  Patient was deemed stable for transfer out of the critical care unit the morning of 10/16/2019, however unit transfer was postponed until the afternoon pending results of repeat CT head  Pt was D/Ean to home environment with recommendations to participate in PT/OT/Speech services at [address] [place] [address]  Pt was D/Ean from all services last week with recommendations to complete FTD evaluation  Pt lives in a one story home with mother  Pt currently performing all ADLs/IADLs at independent status with no difficulties reported  Pt was not working prior to CVA 2* debilitating HAs experienced daily-- Pt worked in Sales for [employer]  Pt loss role of  role until further cleared by neurologist   Pt with personal goals to return to driving both local and highway distances, if able            PMH:   Past Medical History:   Diagnosis Date    Depression     Hypertension     Migraine     Stroke [address] [address] )          Pain Levels:     Restin    With Activity:  0    Objective     Functional Assessment        Comments  See impairment section for further details         IMPAIRMENT SECTION:  1Driving History:  Vehicle Type:   X Car,     Truck,     Motorcycle  Visual History:   X Glasses,     Contacts   Cataracts,     Glaucoma,     Scatoma,     Chandrakant Link   Appointment 2010  Communication Status:   X English,     Kyrgyz  Driving History:   X GPS use,     History of getting lost,    Tickets,     DUI  License Status:   X Active,     Inactive  Last Drove:   2019  Last Accident:   N/A  Initial License Date:   3748  Driving Goals:   X Local     X Highway  Car Transfer:   Independent    Objective  Functional/Cognitive Impairments:  1  DCAT: (see attached report for further details) Pt scoring an 1% likelihood on failing on road   Recommend On Road Assessment:   Yes,     X No  Recommend to [employer]   Yes,     XN  2   OPTEC vision screen: (see attached)   Contrast sensitivity: Abnormal   Far acuity: 20/20 B/L    Near acuity: 20/20 B/L   Color perception: unable to perform 2* Pt being colorblind   Lateral phoria: orthophoria, diopter 8   Depth perception far: inaccurate   Sign recognition: able to ID 11/12 road signs correctly, able to identify 3/3 depth perceptive tasks   Color recognition: Pass  3  Reaction time trials: see attached  trial 1) 0 549 seconds, trial 2) 0 581 seconds,  trial 3) 0 593 seconds, average of 3 trials: 0 574 seconds, Falling within the 25th %ile for reaction time  4   Rapid Pace Walk Test:  5 77 seconds (WNL):  Those with greater than 9 seconds had a 3 fold increase risk of being in an at fault auto accident  5   Physical findings:  cervical rotation R 85*, L 80*; UE and LE AROM full with WNL strength   6  Probability of creating a hazardous situation on specialized on-road test: 1%; see attached report for further details  7   Recommendations:  Cognitive competence for driving should be considered within the range of healthy, safe drivers  Neurologist to further discuss with Pt  Assessment  Assessment details: See skilled analysis for further details  SKILLED ANALYSIS:  Pt is a 45 y o  male referred to Occupational Therapy for Fitness to Drive Evaluation to assess Pt's cognitive, visual, and motor abilities to drive safely in community environment    Pt scoring cognitively at a 1% predicted probability of failing a specialized on-road test  Cognitive competence for driving should be considered within the range of healthy, safe drivers  Individuals scoring in this range have average on-road error points and potential serious errors consistent with scores in the acceptable range on the on-road performance evaluation  Below average scoring was noted in the following areas: initializations and reactions  At this time, OTR encouraging Pt to return to the  role  Neurologist to discuss results with Pt and complete next steps of releasing Pt for return to  role abilities           INTERVENTION COMMENTS:  Diagnosis: Nontraumatic intracerebral hemorrhage  Precautions: Depression, HTN  FOTO: not applicable for FTD Eval  Insurance: Payor: [address] [place] [address] [employer] / Plan: Eliza Claire MA / Product Type: Medicaid HMO /   1 visit

## 2020-01-24 ENCOUNTER — OFFICE VISIT (OUTPATIENT)
Dept: NEUROSURGERY | Facility: CLINIC | Age: 45
End: 2020-01-24
Payer: COMMERCIAL

## 2020-01-24 ENCOUNTER — TRANSCRIBE ORDERS (OUTPATIENT)
Dept: NEUROSURGERY | Facility: CLINIC | Age: 45
End: 2020-01-24

## 2020-01-24 VITALS
WEIGHT: 143 LBS | BODY MASS INDEX: 22.44 KG/M2 | DIASTOLIC BLOOD PRESSURE: 96 MMHG | HEIGHT: 67 IN | HEART RATE: 80 BPM | SYSTOLIC BLOOD PRESSURE: 130 MMHG | TEMPERATURE: 99 F | RESPIRATION RATE: 16 BRPM

## 2020-01-24 DIAGNOSIS — I72.6 VERTEBRAL ARTERY ANEURYSM (HCC): Primary | ICD-10-CM

## 2020-01-24 DIAGNOSIS — Z01.818 PRE-PROCEDURAL EXAMINATION: Primary | ICD-10-CM

## 2020-01-24 DIAGNOSIS — I61.9 INTRAPARENCHYMAL HEMORRHAGE OF BRAIN (HCC): ICD-10-CM

## 2020-01-24 PROCEDURE — 99213 OFFICE O/P EST LOW 20 MIN: CPT | Performed by: RADIOLOGY

## 2020-01-24 RX ORDER — METOPROLOL TARTRATE 50 MG/1
50 TABLET, FILM COATED ORAL DAILY
COMMUNITY

## 2020-01-24 NOTE — PROGRESS NOTES
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  I would like for him to continue taking aspirin 81 mg daily indefinitely  I would also like for him to get a new CTA neck in 1 year from now  Unfortunately we will have to continue surveillance with a CT modality as MRA imaging is likely inadequate for the vertebral artery in this location  We will maybe consider obtaining an MRI brain at that time again however it is pretty convincing his hemorrhage is the result of ASA overuse and heavy weight lifting  Chief Complaint: Follow-up (3 month follow up )      Patient ID: Wing Hall is a 45 y o  male    HPI  Mr  Ival Him presents for follow-up of his right basal ganglia hemorrhage and incidental right vertebral artery aneurysm  He reports he is doing very well in his recovery and almost back to normal however his main complaint is persistent low energy and decreased stamina  He states he would like to go back to work  He no longer experiences daily headaches since his hospitalization and getting his blood pressure under control  He endorses he was taking Excedrin multiple times per day for weeks to months leading up to his intracranial hemorrhage  Review of Systems   Constitutional: Negative  HENT: Positive for tinnitus  Negative for ear pain, nosebleeds and sore throat  Eyes: Negative  Respiratory: Negative  Cardiovascular: Negative  Gastrointestinal: Positive for constipation  Negative for abdominal pain, nausea and vomiting  Endocrine: Negative  Genitourinary: Negative  Musculoskeletal: Negative  Skin: Negative  Allergic/Immunologic: Negative  Neurological: Positive for speech difficulty (sometimes feels like her had slurred speech)  Negative for dizziness, tremors, seizures, weakness, light-headedness, numbness and headaches  Hematological: Bruises/bleeds easily (asa81)  Psychiatric/Behavioral: Negative  I have personally reviewed the MA's review of systems and made changes as necessary  The following portions of the patient's history were reviewed and updated as appropriate: allergies, current medications, past family history, past medical history, past social history, past surgical history and problem list       Active Ambulatory Problems     Diagnosis Date Noted    Intraparenchymal hemorrhage of brain ([facility] ) 10/14/2019    Depression 10/15/2019    Chronic headache 10/16/2019    Vertebral artery aneurysm ([facility] ) 10/17/2019    Essential hypertension 10/18/2019    Junctional bradycardia 10/19/2019    Thrombocytopenia ([facility] ) 10/19/2019     Resolved Ambulatory Problems     Diagnosis Date Noted    Suicidal ideation 10/15/2019     Past Medical History:   Diagnosis Date    Hypertension     Migraine     Stroke [facility])        Past Surgical History:   Procedure Laterality Date    LITHOTRIPSY           Vitals:    01/24/20 0947   BP: 130/96   Pulse: 80   Resp: 16   Temp: 99 °F (37 2 °C)         Objective:    Physical Exam   Constitutional: He is oriented to person, place, and time  He appears well-developed and well-nourished  HENT:   Head: Normocephalic and atraumatic  Eyes: Pupils are equal, round, and reactive to light  EOM are normal    Neck: Normal range of motion  Neck supple  Musculoskeletal: Normal range of motion  He exhibits no edema, tenderness or deformity  Neurological: He is alert and oriented to person, place, and time  No cranial nerve deficit or sensory deficit  He exhibits normal muscle tone  Coordination normal  GCS eye subscore is 4  GCS verbal subscore is 5  GCS motor subscore is 6     Psychiatric: He has a normal mood and affect  His speech is normal  Thought content normal  Cognition and memory are normal      Neurologic Exam     Mental Status   Oriented to person, place, and time  Speech: speech is normal     Cranial Nerves     CN III, IV, VI   Pupils are equal, round, and reactive to light  Extraocular motions are normal        Results/Data:  I have reviewed the results and images from the CTA and MRI in detail with the patient

## 2020-01-29 ENCOUNTER — TELEPHONE (OUTPATIENT)
Dept: NEUROLOGY | Facility: CLINIC | Age: 45
End: 2020-01-29

## 2020-01-29 NOTE — TELEPHONE ENCOUNTER
Per Dr Lillian Casarez I called the patient to informed him that he scored very well on his Fitness to drive evaluation  Per Dr Lillian Casarez he can go back to driving and if he should have any issues he should contact our office

## 2020-01-29 NOTE — TELEPHONE ENCOUNTER
Reviewed the results of his fitness to drive evaluation and he scored well  Given the results he can start driving  Let us know if there are any issues

## 2020-04-01 ENCOUNTER — TELEPHONE (OUTPATIENT)
Dept: NEUROLOGY | Facility: CLINIC | Age: 45
End: 2020-04-01

## 2020-09-25 NOTE — PCC OCCUPATIONAL THERAPY
10/21/19: Pt participates in ADLs, transfers and fine motor coordination tasks with LUE after evaluation this day  Pt requires supervision for ADLs and assist with fine motor tasks due to decreased coordination, safety and endurance  Pt's current LOF as listed above  Pt will benefit from continued skilled OT services to increase independence with daily tasks 
Adequate: hears normal conversation without difficulty

## 2021-01-18 ENCOUNTER — LAB (OUTPATIENT)
Dept: LAB | Facility: HOSPITAL | Age: 46
End: 2021-01-18
Payer: COMMERCIAL

## 2021-01-18 DIAGNOSIS — Z01.818 PRE-PROCEDURAL EXAMINATION: ICD-10-CM

## 2021-01-18 LAB
BUN SERPL-MCNC: 14 MG/DL (ref 7–25)
CREAT SERPL-MCNC: 1.17 MG/DL (ref 0.7–1.3)
GFR SERPL CREATININE-BSD FRML MDRD: 76 ML/MIN/1.73SQ M

## 2021-01-18 PROCEDURE — 36415 COLL VENOUS BLD VENIPUNCTURE: CPT

## 2021-01-18 PROCEDURE — 84520 ASSAY OF UREA NITROGEN: CPT

## 2021-01-18 PROCEDURE — 82565 ASSAY OF CREATININE: CPT

## 2021-01-19 ENCOUNTER — HOSPITAL ENCOUNTER (OUTPATIENT)
Dept: CT IMAGING | Facility: HOSPITAL | Age: 46
Discharge: HOME | End: 2021-01-19
Payer: COMMERCIAL

## 2021-01-19 DIAGNOSIS — I72.6 VERTEBRAL ARTERY ANEURYSM (HCC): ICD-10-CM

## 2021-01-19 PROCEDURE — 70498 CT ANGIOGRAPHY NECK: CPT

## 2021-01-19 RX ADMIN — IOHEXOL 85 ML: 350 INJECTION, SOLUTION INTRAVENOUS at 10:56

## 2021-01-22 ENCOUNTER — TELEPHONE (OUTPATIENT)
Dept: NEUROSURGERY | Facility: CLINIC | Age: 46
End: 2021-01-22

## 2021-01-25 ENCOUNTER — TELEMEDICINE (OUTPATIENT)
Dept: NEUROSURGERY | Facility: CLINIC | Age: 46
End: 2021-01-25
Payer: COMMERCIAL

## 2021-01-25 DIAGNOSIS — I72.6 VERTEBRAL ARTERY ANEURYSM (HCC): Primary | ICD-10-CM

## 2021-01-25 PROCEDURE — G2012 BRIEF CHECK IN BY MD/QHP: HCPCS | Performed by: PHYSICIAN ASSISTANT

## 2021-01-25 NOTE — PROGRESS NOTES
Virtual Regular Visit      Assessment/Plan:    Problem List Items Addressed This Visit        Cardiovascular and Mediastinum    Vertebral artery aneurysm ([facility] [address] ) - Primary     · Pt has a virtual visit with neurosurgery today for 1 year follow up for the right vertebral artery pseudoaneurysm with an updated CTA neck w wo  · Pt with a hx of right IPH in 10/2019 when pt had pw/left sided weakness and HA  · Pt had been advised to take ASA 81 mg      · Imaging  · CTA Neck w wo 1/25/21: Stable 5 mm aneurysm or pseudoaneurysm arising from the V3 segment of right vertebral artery  No significant stenosis  Plan     · Reviewed imaging findings with pt  · Pt to continue use of ASA 81 mg    · Discussed with pt to follow up with neurology as needed s/p right hypertensive IPH  · Given stable right vertebral artery pseudoaneurysm, no neurosurgical intervention is anticipated at this time  · Pt will have continued surveillance in 2 years with a repeat CTA neck w wo  · Discussed plan of care with pt who showed understanding  · Pt made aware to contact neurosurgery with any questions or concerns  Relevant Orders    BUN    Creatinine, serum    CTA neck w wo contrast               Reason for visit is   Chief Complaint   Patient presents with    Virtual Regular Visit     follow up with CTA        Encounter provider Lannis Sacks, PA-C    Provider located at [address] [place]  [address] [facility] [address] [phone]      Recent Visits  Date Type Provider Dept   01/22/21 Telephone Baron JONES0 [facility] recent visits within past 7 days and meeting all other requirements     Today's Visits  Date Type Provider Dept   01/25/21 [address] [facility], [address] [place] [address]   Showing today's visits and meeting all other requirements     Future Appointments  No visits were found meeting these conditions  Showing future appointments within next 150 days and meeting all other requirements        The patient was identified by name and date of birth  Eze Bhanus was informed that this is a telemedicine visit and that the visit is being conducted through telephone  My office door was closed  No one else was in the room  He acknowledged consent and understanding of privacy and security of the video platform  The patient has agreed to participate and understands they can discontinue the visit at any time  Patient is aware this is a billable service  It was my intent to perform this visit via video technology but the patient was not able to do a video connection so the visit was completed via audio telephone only  Dom Rodriguez is a 39 y o  male       Pt is a 40 yo male with PMhx of large right IPH on 10/2019 when pt had presented with headaches/left sided weakness after pt was doing heavy weight lifting  At that time, pt was found to have right vertebral artery 2-3 mm pseudoaneurysm  Pt was advised to continue use of ASA 81mg indefinitely  Pt reports he is doing wonderful at this time  He reports at this time, he no longer gets headaches as he did before  He reports the only issues he has that he is concerned about his energy  He reports he gets fatigued easily  He reports he will have 4-5 hours when he is okay but will then get really fatigued  Pt reports that at this time he does not notice any weakness or differences in strength left vs right  Pt denies numbness in BUE/BLE  Pt denies gait instability  Pt reports that he followed up with neurology last year but do to Covid his next apt with neurology was canceled and he has not been to see neurology since          Past Medical History:   Diagnosis Date    Depression     Hypertension     Migraine     Stroke [facility])        Past Surgical History:   Procedure Laterality Date    LITHOTRIPSY         Current Outpatient Medications   Medication Sig Dispense Refill    amLODIPine (NORVASC) 10 mg tablet Take 1 tablet (10 mg total) by mouth daily 30 tablet 0    aspirin (ECOTRIN LOW STRENGTH) 81 mg EC tablet Take 1 tablet (81 mg total) by mouth daily 30 tablet 0    Docusate Calcium (STOOL SOFTENER PO) Take by mouth 2 (two) times a day      metoprolol tartrate (LOPRESSOR) 50 mg tablet Take 50 mg by mouth daily      nortriptyline (PAMELOR) 10 mg capsule Take 1 capsule (10 mg total) by mouth daily at bedtime 30 capsule 0    Triamcinolone Acetonide (NASACORT ALLERGY 24HR NA) 1 spray into each nostril daily      metoclopramide (REGLAN) 10 mg tablet Take 1 tablet (10 mg total) by mouth 2 (two) times a day as needed (for headache) Not to be used more than 2 days per week  (Patient not taking: Reported on 1/24/2020) 15 tablet 0     No current facility-administered medications for this visit  Allergies   Allergen Reactions    Other      cats       Review of Systems   Constitutional: Positive for fatigue (improved )  HENT: Positive for tinnitus  Eyes: Negative  Respiratory: Negative  Cardiovascular: Negative  Gastrointestinal: Positive for constipation  Endocrine: Positive for cold intolerance (sensitivity changes)  Genitourinary: Negative  Musculoskeletal: Negative  Skin: Negative  Allergic/Immunologic: Positive for environmental allergies and food allergies  Neurological: Positive for speech difficulty (sometimes feels like he has slurred speech)  Negative for dizziness, seizures, weakness, light-headedness, numbness and headaches  Hematological: Negative  Does not bruise/bleed easily (asa81)  Psychiatric/Behavioral: Positive for dysphoric mood  The patient is nervous/anxious  There were no vitals filed for this visit  Physical Exam     Speech is clear and congruent       I spent 20 minutes directly with the patient during this visit      VIRTUAL VISIT DISCLAIMER    Earnest Munoz acknowledges that he has consented to an online visit or consultation  He understands that the online visit is based solely on information provided by him, and that, in the absence of a face-to-face physical evaluation by the physician, the diagnosis he receives is both limited and provisional in terms of accuracy and completeness  This is not intended to replace a full medical face-to-face evaluation by the physician  Felicia Chávez understands and accepts these terms

## 2021-01-25 NOTE — ASSESSMENT & PLAN NOTE
· Pt has a virtual visit with neurosurgery today for 1 year follow up for the right vertebral artery pseudoaneurysm with an updated CTA neck w wo  · Pt with a hx of right IPH in 10/2019 when pt had pw/left sided weakness and HA  · Pt had been advised to take ASA 81 mg      · Imaging  · CTA Neck w wo 1/25/21: Stable 5 mm aneurysm or pseudoaneurysm arising from the V3 segment of right vertebral artery  No significant stenosis  Plan     · Reviewed imaging findings with pt  · Pt to continue use of ASA 81 mg    · Discussed with pt to follow up with neurology as needed s/p right hypertensive IPH  · Given stable right vertebral artery pseudoaneurysm, no neurosurgical intervention is anticipated at this time  · Pt will have continued surveillance in 2 years with a repeat CTA neck w wo  · Discussed plan of care with pt who showed understanding  · Pt made aware to contact neurosurgery with any questions or concerns

## 2023-02-08 ENCOUNTER — TELEPHONE (OUTPATIENT)
Dept: NEUROSURGERY | Facility: CLINIC | Age: 48
End: 2023-02-08

## 2023-02-08 NOTE — TELEPHONE ENCOUNTER
As per last telemedicine visit Pt will have continued surveillance in 2 years with a repeat CTA neck w wo       Sent recall letter via David Vilchis

## 2023-02-13 ENCOUNTER — TELEPHONE (OUTPATIENT)
Dept: NEUROSURGERY | Facility: CLINIC | Age: 48
End: 2023-02-13

## 2023-02-13 NOTE — TELEPHONE ENCOUNTER
Received a call from patient looking to schedule a virtual follow up appt after his upcoming scan  Attempted to return call to patient to help schedule him a virtual appt with an AP for a 2 year f/u however he did not answer  Left VM requesting he call back to schedule, provided office number

## 2023-02-16 ENCOUNTER — APPOINTMENT (OUTPATIENT)
Dept: LAB | Facility: MEDICAL CENTER | Age: 48
End: 2023-02-16

## 2023-02-16 DIAGNOSIS — I72.6 VERTEBRAL ARTERY ANEURYSM (HCC): ICD-10-CM

## 2023-02-16 LAB
BUN SERPL-MCNC: 19 MG/DL (ref 5–25)
CREAT SERPL-MCNC: 1.04 MG/DL (ref 0.6–1.3)
GFR SERPL CREATININE-BSD FRML MDRD: 86 ML/MIN/1.73SQ M

## 2023-02-23 DIAGNOSIS — I72.6 VERTEBRAL ARTERY ANEURYSM (HCC): Primary | ICD-10-CM

## 2023-03-03 ENCOUNTER — HOSPITAL ENCOUNTER (OUTPATIENT)
Dept: CT IMAGING | Facility: HOSPITAL | Age: 48
End: 2023-03-03

## 2023-03-03 DIAGNOSIS — I72.6 VERTEBRAL ARTERY ANEURYSM (HCC): ICD-10-CM

## 2023-03-03 RX ADMIN — IOHEXOL 85 ML: 350 INJECTION, SOLUTION INTRAVENOUS at 07:20

## 2023-03-07 ENCOUNTER — TELEPHONE (OUTPATIENT)
Dept: NEUROLOGY | Facility: CLINIC | Age: 48
End: 2023-03-07

## 2023-03-07 ENCOUNTER — TELEMEDICINE (OUTPATIENT)
Dept: NEUROSURGERY | Facility: CLINIC | Age: 48
End: 2023-03-07

## 2023-03-07 DIAGNOSIS — I72.6 VERTEBRAL ARTERY ANEURYSM (HCC): Primary | ICD-10-CM

## 2023-03-07 NOTE — TELEPHONE ENCOUNTER
received -Hi, this is Mallory Vizcaino returning livan's call  It's about, I apologize  About 20 to 3  I'll be available all day today  I'll be available all day tomorrow, just please give me a call back when it's convenient for you and my phone number you do have, but I'll repeat it  It's [phone]  Again, it's Mallory Vizcaino  Thanks so much  Bye bye   ------------------------------------------  Called pt, scheduled him for 3/21 with linnea for NP appt  He needs to check with his mom regarding appt and will call back if he needs to change appt

## 2023-03-07 NOTE — TELEPHONE ENCOUNTER
Discussed with Dr Dylan Brush to see how we should proceed with scheduling  Patient was last seen by Dr Young Kimble on 1/3/2020  Patient canceled appointment on 4/7/20 and did not reschedule the appointment  Dr Dylan Brush recommended for the patient to be seen by an AP and to have a MOCA done by the MA in the beginning of the appointment  He also recommended BP control  Called patient  He confirmed how he is experiencing worsening with memory recall  He cannot exactly say when this started  Denies any new or worsening stroke like symptoms  Inquired about his BP  He reports how the last time he took his BP at home was about one month ago and it was around 128/90  Requested patient to take his BP now  Right now it is 155/91  Reports adherence to BP medications  He reports how he is stressed regarding the health of his mother, he took her to the ED yesterday  Encouraged patient to reach out to PCP immediately for further BP management  Patient reports how he will do so now  Patient wishes to be seen at the [facility] office  Advised how this RN will send a message to Razia Browne for approval  He is aware this RN will call him to schedule once a response is received  Instructed patient to report to the ED with any new or worsening stroke like symptoms  He expressed understanding and was appreciative

## 2023-03-07 NOTE — TELEPHONE ENCOUNTER
Called patient to schedule a new patient appointment with Rosalina Delgado at [facility] office  Patient did not answer  Left a voice message requesting for a call back  Provided the office's phone number

## 2023-03-07 NOTE — TELEPHONE ENCOUNTER
----- Message from Vicente Salazar PA-C sent at 3/7/2023 11:09 AM EST -----  Regarding: stroke follow up  Hi!! This manoj had a stroke in 2019 and says he fell through the cracks 2/2 COVID and his neurologist retiring? Anyway he also has an aneurysm and is on baby aspirin daily but is complaining of worsening mental fog and memory recall and would like at least one more appointment with the stroke team if possible  He lives in [place], not sure where the closest location would be but could you call him or have someone set up an appointment? Thanks!

## 2023-03-07 NOTE — ASSESSMENT & PLAN NOTE
Pt presents today for 2 year follow up of right vertebral artery pseudoaneurysm  · Pt with a hx of right IPH in 10/2019 when pt had pw/left sided weakness and HA  · On ASA 81mg daily indefinitiely  · Currently complaining of difficulty with memory recall, worsening since stroke    Imaging:  · CTA neck w/wo, 3/3/23: Stable 5 mm posteriorly oriented aneurysm along the right vertebral artery V3 segment     Plan   · Reviewed imaging findings with pt  · Pt to continue use of ASA 81 mg    · Discussed with pt to follow up with neurology as needed s/p right hypertensive IPH  · He states he was lost to follow up 2/2 COVID and his neurologist retiring but is interested in re-establishing care given worsening memory issues and word recall  · Message sent to Elvis Stewart RN in stroke neurology   · Given stable right vertebral artery pseudoaneurysm, no neurosurgical intervention is anticipated at this time  · Pt will have continued surveillance in 2 years with a repeat CTA neck w wo     · Follow up after CTA, appointment can remain virtual given patient location in [facility]

## 2023-03-07 NOTE — PROGRESS NOTES
Virtual Regular Visit    Verification of patient location:    Patient is located in the following state in which I hold an active license [place]      Assessment/Plan:    Problem List Items Addressed This Visit        Cardiovascular and Mediastinum    Vertebral artery aneurysm ([address] ) - Primary     Pt presents today for 2 year follow up of right vertebral artery pseudoaneurysm  · Pt with a hx of right IPH in 10/2019 when pt had pw/left sided weakness and HA  · On ASA 81mg daily indefinitiely  · Currently complaining of difficulty with memory recall, worsening since stroke    Imaging:  · CTA neck w/wo, 3/3/23: Stable 5 mm posteriorly oriented aneurysm along the right vertebral artery V3 segment     Plan   · Reviewed imaging findings with pt  · Pt to continue use of ASA 81 mg    · Discussed with pt to follow up with neurology as needed s/p right hypertensive IPH  · He states he was lost to follow up 2/2 COVID and his neurologist retiring but is interested in re-establishing care given worsening memory issues and word recall  · Message sent to Hi Bocanegra RN in stroke neurology   · Given stable right vertebral artery pseudoaneurysm, no neurosurgical intervention is anticipated at this time  · Pt will have continued surveillance in 2 years with a repeat CTA neck w wo  · Follow up after CTA, appointment can remain virtual given patient location in [address] [place] [address]    CTA neck w wo contrast            Reason for visit is   Chief Complaint   Patient presents with   • Virtual Regular Visit        Encounter provider Ruthe Sandifer, PA-C    Provider located at [address] [place] [address]  [address] [facility]  [address] [facility] [address]      Recent Visits  No visits were found meeting these conditions    Showing recent visits within past 7 days and meeting all other requirements  Today's Visits  Date Type Provider Dept   03/07/23 Telemedicine Caryl Wooten Colby Wheatley PA-C [facility]   Showing today's visits and meeting all other requirements  Future Appointments  No visits were found meeting these conditions  Showing future appointments within next 150 days and meeting all other requirements       The patient was identified by name and date of birth  Lisa Hinds was informed that this is a telemedicine visit and that the visit is being conducted through the [employer]  He agrees to proceed     My office door was closed  No one else was in the room  He acknowledged consent and understanding of privacy and security of the video platform  The patient has agreed to participate and understands they can discontinue the visit at any time  Patient is aware this is a billable service  Subjective    This is a 38 yo male with a PMH significant for large right IPH on 10/2019 when pt had presented with headaches/left sided weakness after pt was doing heavy weight lifting  At that time, pt was found to have an incidental right vertebral artery 2-3 mm pseudoaneurysm  Pt was advised to continue use of ASA 81mg indefinitely  Today he states he is doing very well and continually improving  He still has problems with low energy but this is getting better  His main complaint today is worsening trouble with memory / word recall  He is quite concerned about this and would like to re-establish care with stroke neurology for evaluation since he hasn't been seen since COVID  He denies headache, speech or vision changes, confusion, focal weakness other than stable trace left sided weakness from his stroke  He continues with weight lifting  He denies bowel/bladder complaints          Past Medical History:   Diagnosis Date   • Depression    • Hypertension    • Migraine    • Stroke [facility])        Past Surgical History:   Procedure Laterality Date   • LITHOTRIPSY         Current Outpatient Medications   Medication Sig Dispense Refill   • amLODIPine (NORVASC) 10 mg tablet Take 1 tablet (10 mg total) by mouth daily 30 tablet 0   • aspirin (ECOTRIN LOW STRENGTH) 81 mg EC tablet Take 1 tablet (81 mg total) by mouth daily 30 tablet 0   • Docusate Calcium (STOOL SOFTENER PO) Take by mouth 2 (two) times a day     • metoprolol tartrate (LOPRESSOR) 50 mg tablet Take 50 mg by mouth daily     • nortriptyline (PAMELOR) 10 mg capsule Take 1 capsule (10 mg total) by mouth daily at bedtime 30 capsule 0   • Triamcinolone Acetonide (NASACORT ALLERGY 24HR NA) 1 spray into each nostril daily     • metoclopramide (REGLAN) 10 mg tablet Take 1 tablet (10 mg total) by mouth 2 (two) times a day as needed (for headache) Not to be used more than 2 days per week  (Patient not taking: Reported on 1/24/2020) 15 tablet 0     No current facility-administered medications for this visit  Allergies   Allergen Reactions   • Other      cats       Review of Systems   Psychiatric/Behavioral: Positive for confusion (short term )  All other systems reviewed and are negative  ROS was personally reviewed and changes made as needed       Video Exam    There were no vitals filed for this visit      Physical Exam   AOx4  Speech clear and fluent  CB 2-12 intact  No focal weakness  Ambulating independently    I spent 15 minutes with patient today in which greater than 50% of the time was spent in counseling/coordination of care regarding imaging review, symptom management, history taking

## 2023-03-21 ENCOUNTER — OFFICE VISIT (OUTPATIENT)
Dept: NEUROLOGY | Facility: CLINIC | Age: 48
End: 2023-03-21

## 2023-03-21 VITALS
BODY MASS INDEX: 26.94 KG/M2 | HEART RATE: 90 BPM | TEMPERATURE: 97.9 F | SYSTOLIC BLOOD PRESSURE: 130 MMHG | WEIGHT: 172 LBS | DIASTOLIC BLOOD PRESSURE: 82 MMHG | OXYGEN SATURATION: 97 %

## 2023-03-21 DIAGNOSIS — I10 ESSENTIAL HYPERTENSION: Primary | ICD-10-CM

## 2023-03-21 DIAGNOSIS — F32.A ANXIETY AND DEPRESSION: ICD-10-CM

## 2023-03-21 DIAGNOSIS — I61.9 INTRAPARENCHYMAL HEMORRHAGE OF BRAIN (HCC): ICD-10-CM

## 2023-03-21 DIAGNOSIS — F41.9 ANXIETY AND DEPRESSION: ICD-10-CM

## 2023-03-21 NOTE — PROGRESS NOTES
Patient ID: Magy Sandoval is a 57 y o  male  Assessment/Plan:    Essential hypertension  Appears well controlled  BP today 130/82  Home readings are reported as high 120's/80  Goal <130/80    He is due for follow up with his primary care provider  We discussed he should at least be seen annually by his PCP for routine health maintenance including screening labs (Lipid and HgbA1c) and cancer screenings  He is scheduled to re-establish care tomorrow    Anxiety and depression  Reports anxiety and depression  Denies SI/HI  Believes it is situational  He is maintained on Nortriptyline 10 mg at bedtime  Encouraged follow up with PCP    History of 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/2019)  Magy Sandoval is a 57year old male known to the practice for his 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)  He continues on ASA 81 mg daily  His blood pressure appears to be well controlled  He denies any new or worsening symptoms concerning for stroke, TIA or hemorrhage  His exam is excellent without any focal or lateralizing motor, sensory or neurologic deficits  He is overdue for screening labs (HgbA1c and Lipid panel) as he has not seen his PCP in almost 3 years, he is scheduled to see her tomorrow  We did discuss that his difficulty with word recall could be related to his prior hemorrhage but is also more likely multifactorial related to his anxiety and depression and decreased social interactions with being out of work  His Kong Cognitive Assessment was excellent with a score of 29/30  He was a excellent historian today and there was no indication of language or fluency dysfunction throughout our visit today  I encouraged him to increase mind stimulating activities, social interactions, and reading aloud  He will also be discussing his anxiety and depression with his PCP tomorrow and is looking forward to returning to work hopefully soon      We reviewed his stroke risk factors and management of the same  He was provided stroke education and we reviewed stroke warning signs/symptoms and reasons to return by ambulance to the ER  He is a non-smoker, however I still educated him on the importance of avoiding tobacco especially in the setting of his unruptured aneurysm  We discussed he could continue to follow up with our office on an annual basis or alternatively considering his stability he may chose to continue to follow with his PCP and return to Neurology on an as needed basis  He would prefer to follow with his PCP  Plan as outlined below  Plan:  - Continue with good blood pressure control; I would recommend monitoring at home at least 3 times per week; Goal of <130/80  - Continue with good cholesterol control; Goal LDL <70; due for a repeat seeing PCP tomorrow  - Continue with good blood sugar control; Goal HgbA1c <7 0; due for a repeat seeing PCP tomorrow  - Will defer monitoring of cholesterol and blood sugar and management of hypertensive medications to the primary care provider  - Stay well hydrated by drinking enough water   - Eat a healthy diet, high in lean meats fish, turkey, chicken  Low in fats, cholesterol, sugars and sodium  Avoid canned foods,  get lots of fresh/frozen vegetables/fruits  - Get routine exercise/physical activity as much as able to tolerate  - Keep follow ups with your other health care providers  - Continue  ASA 81 mg daily  - Follow up as needed with Neurology     If he has any symptoms concerning for TIA or stroke including sudden painless loss of vision or double vision, difficulty speaking or swallowing, vertigo/room spinning that does not quickly resolve, or weakness/numbness affecting 1 side of the face or body he should proceed by ambulance to the nearest emergency room immediately         Diagnoses and all orders for this visit:    Essential hypertension    History of 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/2019)    Anxiety and depression         I have spent a total time of 60 minutes on 03/21/23 in caring for this patient including Diagnostic results, Prognosis, Instructions for management, Patient and family education, Risk factor reductions, Impressions, Counseling / Coordination of care, Documenting in the medical record, Reviewing / ordering tests, medicine, procedures   and Obtaining or reviewing history    Subjective:    DAVID Spencer is a 57year old male known to the practice for his 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)  He also follows with neurosurgery for a right vertebral aneurysm  He was last seen by Dr Modesto Brown 1/3/2020  He returns to the office today with concerns about subjective worsening mental fog and memory recall  He is accompanied by his mother today  Secondary Stroke Prevention  He takes ASA 81 mg daily   Compliant with his medications, no issues affording or obtaining medications  Denies excessive bruising or bleeding     Deficits  Denies numbness, tingling, weakness, dizziness, headaches, vision changes or any new or worsening stroke like symptoms  He previously had difficulty with speech or swallowing, now occasionally notes some difficulty described as needing to focus on annunciation at times  Monitoring  BP- he checks his BP daily x the last 2 weeks; high 120's/80's  He is not diabetic   CTA H/N 3/3/23- stable 5 mm aneurysm along the right vertebral artery V3 segment   Last Lipid Panel and HgbA1c were completed 10/15/2019- he is scheduled to see his PCP for the first time in 3 years tomorrow  HgbA1c 4 2  Lipid panel LDL 71     Safety  He lives alone  Independent of all ADLs  No falls at home  He manages his own medications and finances without difficulty   Assistive devices- none   Driving- he drives w/o difficulty; has never gotten lost  Memory- he notes his recall is "terrible", recently was asked about who our president was and it took approx  12 seconds to recall the name   He denies any confusion, LOC, MUMTAZ, or difficulty with day to day activities  He states his memory is actually excellent, it is more finding his words takes longer occasionally  Substance use    Smoking- never  Etoh- never   Drugs- never  Caffeine- none      Sleep  He goes to sleep and maintains sleep  He does not believe he snores   He sleeps an average of 6 hours per night     Diet  No particular kind of diet  He does try to watch sodium intake  He avoids fast food    Exercise  No routine exercise  When it gets warmer out he walks/runs     PT/OT/ST  S/p stroke he did complete PT/OT/ST     Mood  He reports both anxiety and depression equally  He denies SI/HI  He is maintained on Nortriptyline 10 mg at bedtime    Follow ups  He has seen neurosurgery 3/7/23; follow up again in 2 years  Has not seen PCP in 3 years; has appt tomorrow     Work  He has not worked in years   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[employer])  He feels this has made a big difference in his anxiety, depression and difficulty with word recall since he is speaking less  The following portions of the patient's history were reviewed and updated as appropriate: allergies, current medications, past family history, past medical history, past social history and past surgical history  Objective:    Blood pressure 130/82, pulse 90, temperature 97 9 °F (36 6 °C), temperature source Temporal, weight 78 kg (172 lb), SpO2 97 %  Physical Exam  Constitutional:       Appearance: Normal appearance  He is normal weight  HENT:      Head: Normocephalic and atraumatic  Nose: Nose normal       Mouth/Throat:      Mouth: Mucous membranes are moist       Pharynx: Oropharynx is clear  No oropharyngeal exudate or posterior oropharyngeal erythema  Eyes:      Extraocular Movements: Extraocular movements intact  Conjunctiva/sclera: Conjunctivae normal       Pupils: Pupils are equal, round, and reactive to light  Cardiovascular:      Rate and Rhythm: Normal rate  Pulses: Normal pulses  Pulmonary:      Effort: Pulmonary effort is normal    Musculoskeletal:         General: Normal range of motion  Cervical back: Normal range of motion  Right lower leg: No edema  Left lower leg: No edema  Skin:     General: Skin is warm and dry  Capillary Refill: Capillary refill takes less than 2 seconds  Neurological:      General: No focal deficit present  Mental Status: He is alert and oriented to person, place, and time  Cranial Nerves: No cranial nerve deficit  Sensory: No sensory deficit  Motor: No weakness  Coordination: Coordination normal       Gait: Gait normal       Deep Tendon Reflexes: Reflexes normal    Psychiatric:         Mood and Affect: Mood normal          Behavior: Behavior normal          Thought Content: Thought content normal          Judgment: Judgment normal        Neurological Examination   On neurological examination patient is alert, awake, oriented and in no distress  He is an excellent historian  Speech is fluent without dysarthria or aphasia  No speech difficulties noted on exam today  Cranial nerves 2-12 were symmetrically intact bilaterally  No evidence of any focal weakness or sensory loss in the upper or lower extremities  Motor testing reveals 5/5 strength of the bilateral upper and lower extremities  There was no pronator drift  No fasciculations present  No abnormal involuntary movements  Finger- to-nose reveals no tremor or ataxia and intact proprioceptive function, no dysmetria was noted  Rapid alternating movement normal  Sensation was intact to vibration, light touch, and temperature in bilateral upper and lower extremities  Deep tendon reflexes were 2+ and symmetric in the bilateral upper and lower extremities  He is able to rise easily without assistance from a seated position  Casual gait is normal including stance, stride, and arm swing  Normal tandem gait  Romberg is absent   Kong Cognitive Assessment is 29/30  Of note, he could recall 5/5 delayed recall at 60 minutes  ROS:    Review of Systems   Constitutional: Negative  Negative for appetite change and fever  HENT: Negative  Negative for hearing loss, tinnitus, trouble swallowing and voice change  Eyes: Negative  Negative for photophobia, pain and visual disturbance  Respiratory: Negative  Negative for shortness of breath  Cardiovascular: Negative  Negative for palpitations  Gastrointestinal: Negative  Negative for nausea and vomiting  Endocrine: Negative  Negative for cold intolerance  Genitourinary: Negative  Negative for dysuria, frequency and urgency  Musculoskeletal: Negative  Negative for gait problem, myalgias and neck pain  Skin: Negative  Negative for rash  Allergic/Immunologic: Negative  Neurological: Negative  Negative for dizziness, tremors, seizures, syncope, facial asymmetry, speech difficulty, weakness, light-headedness, numbness and headaches  Hematological: Negative  Does not bruise/bleed easily  Psychiatric/Behavioral: Negative  Negative for confusion, hallucinations and sleep disturbance  Reviewed ROS as entered by medical assistant

## 2023-03-21 NOTE — ASSESSMENT & PLAN NOTE
Appears well controlled  BP today 130/82  Home readings are reported as high 120's/80  Goal <130/80    He is due for follow up with his primary care provider  We discussed he should at least be seen annually by his PCP for routine health maintenance including screening labs (Lipid and HgbA1c) and cancer screenings    He is scheduled to re-establish care tomorrow

## 2023-03-21 NOTE — ASSESSMENT & PLAN NOTE
Reports anxiety and depression  Denies SI/HI  Believes it is situational  He is maintained on Nortriptyline 10 mg at bedtime  Encouraged follow up with PCP

## 2023-03-21 NOTE — PATIENT INSTRUCTIONS
- Continue with good blood pressure control; I would recommend monitoring at home at least 3 times per week; Goal of <130/80  - Continue with good cholesterol control; Goal LDL <70; due for a repeat seeing PCP tomorrow  - Continue with good blood sugar control; Goal HgbA1c <7 0; due for a repeat seeing PCP tomorrow  - Will defer monitoring of cholesterol and blood sugar and management of hypertensive medications to the primary care provider  - Stay well hydrated by drinking enough water   - Eat a healthy diet, high in lean meats fish, turkey, chicken  Low in fats, cholesterol, sugars and sodium  Avoid canned foods,  get lots of fresh/frozen vegetables/fruits  - Get routine exercise/physical activity as much as able to tolerate  - Keep follow ups with your other health care providers  - Continue  ASA 81 mg daily  - Follow up as needed with Neurology     If he has any symptoms concerning for TIA or stroke including sudden painless loss of vision or double vision, difficulty speaking or swallowing, vertigo/room spinning that does not quickly resolve, or weakness/numbness affecting 1 side of the face or body he should proceed by ambulance to the nearest emergency room immediately

## 2023-03-21 NOTE — ASSESSMENT & PLAN NOTE
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)  He continues on ASA 81 mg daily  His blood pressure appears to be well controlled  He denies any new or worsening symptoms concerning for stroke, TIA or hemorrhage  His exam is excellent without any focal or lateralizing motor, sensory or neurologic deficits  He is overdue for screening labs (HgbA1c and Lipid panel) as he has not seen his PCP in almost 3 years, he is scheduled to see her tomorrow  We did discuss that his difficulty with word recall could be related to his prior hemorrhage but is also more likely multifactorial related to his anxiety and depression and decreased social interactions with being out of work  His Kong Cognitive Assessment was excellent with a score of 29/30  He was a excellent historian today and there was no indication of language or fluency dysfunction throughout our visit today  I encouraged him to increase mind stimulating activities, social interactions, and reading aloud  He will also be discussing his anxiety and depression with his PCP tomorrow and is looking forward to returning to work hopefully soon  We reviewed his stroke risk factors and management of the same  He was provided stroke education and we reviewed stroke warning signs/symptoms and reasons to return by ambulance to the ER  He is a non-smoker, however I still educated him on the importance of avoiding tobacco especially in the setting of his unruptured aneurysm  We discussed he could continue to follow up with our office on an annual basis or alternatively considering his stability he may chose to continue to follow with his PCP and return to Neurology on an as needed basis  He would prefer to follow with his PCP  Plan as outlined below         Plan:  - Continue with good blood pressure control; I would recommend monitoring at home at least 3 times per week; Goal of <130/80  - Continue with good cholesterol control; Goal LDL <70; due for a repeat seeing PCP tomorrow  - Continue with good blood sugar control; Goal HgbA1c <7 0; due for a repeat seeing PCP tomorrow  - Will defer monitoring of cholesterol and blood sugar and management of hypertensive medications to the primary care provider  - Stay well hydrated by drinking enough water   - Eat a healthy diet, high in lean meats fish, turkey, chicken  Low in fats, cholesterol, sugars and sodium  Avoid canned foods,  get lots of fresh/frozen vegetables/fruits  - Get routine exercise/physical activity as much as able to tolerate  - Keep follow ups with your other health care providers  - Continue  ASA 81 mg daily  - Follow up as needed with Neurology     If he has any symptoms concerning for TIA or stroke including sudden painless loss of vision or double vision, difficulty speaking or swallowing, vertigo/room spinning that does not quickly resolve, or weakness/numbness affecting 1 side of the face or body he should proceed by ambulance to the nearest emergency room immediately

## 2023-03-22 ENCOUNTER — APPOINTMENT (OUTPATIENT)
Dept: LAB | Facility: MEDICAL CENTER | Age: 48
End: 2023-03-22

## 2023-03-22 DIAGNOSIS — Z86.73 HISTORY OF CVA (CEREBROVASCULAR ACCIDENT): ICD-10-CM

## 2023-03-22 DIAGNOSIS — D69.6 THROMBOCYTOPENIA (HCC): ICD-10-CM

## 2023-03-22 DIAGNOSIS — R41.3 MEMORY LOSS: ICD-10-CM

## 2023-03-22 DIAGNOSIS — I10 HYPERTENSION, UNSPECIFIED TYPE: ICD-10-CM

## 2023-03-22 DIAGNOSIS — Z00.00 WELLNESS EXAMINATION: ICD-10-CM

## 2023-03-22 DIAGNOSIS — Z86.69 HISTORY OF MIGRAINE HEADACHES: Primary | ICD-10-CM

## 2023-03-22 LAB
25(OH)D3 SERPL-MCNC: 15.9 NG/ML (ref 30–100)
ALBUMIN SERPL BCP-MCNC: 4.7 G/DL (ref 3.5–5)
ALP SERPL-CCNC: 89 U/L (ref 46–116)
ALT SERPL W P-5'-P-CCNC: 90 U/L (ref 12–78)
ANION GAP SERPL CALCULATED.3IONS-SCNC: 3 MMOL/L (ref 4–13)
AST SERPL W P-5'-P-CCNC: 35 U/L (ref 5–45)
BASOPHILS # BLD AUTO: 0.04 THOUSANDS/ÂΜL (ref 0–0.1)
BASOPHILS NFR BLD AUTO: 1 % (ref 0–1)
BILIRUB SERPL-MCNC: 0.85 MG/DL (ref 0.2–1)
BUN SERPL-MCNC: 15 MG/DL (ref 5–25)
CALCIUM SERPL-MCNC: 9.3 MG/DL (ref 8.3–10.1)
CHLORIDE SERPL-SCNC: 107 MMOL/L (ref 96–108)
CHOLEST SERPL-MCNC: 177 MG/DL
CO2 SERPL-SCNC: 29 MMOL/L (ref 21–32)
CREAT SERPL-MCNC: 1.15 MG/DL (ref 0.6–1.3)
EOSINOPHIL # BLD AUTO: 0.38 THOUSAND/ÂΜL (ref 0–0.61)
EOSINOPHIL NFR BLD AUTO: 7 % (ref 0–6)
ERYTHROCYTE [DISTWIDTH] IN BLOOD BY AUTOMATED COUNT: 12.1 % (ref 11.6–15.1)
GFR SERPL CREATININE-BSD FRML MDRD: 75 ML/MIN/1.73SQ M
GLUCOSE P FAST SERPL-MCNC: 90 MG/DL (ref 65–99)
HCT VFR BLD AUTO: 46.1 % (ref 36.5–49.3)
HDLC SERPL-MCNC: 45 MG/DL
HGB BLD-MCNC: 16.4 G/DL (ref 12–17)
IMM GRANULOCYTES # BLD AUTO: 0.02 THOUSAND/UL (ref 0–0.2)
IMM GRANULOCYTES NFR BLD AUTO: 0 % (ref 0–2)
LDLC SERPL CALC-MCNC: 121 MG/DL (ref 0–100)
LYMPHOCYTES # BLD AUTO: 1.38 THOUSANDS/ÂΜL (ref 0.6–4.47)
LYMPHOCYTES NFR BLD AUTO: 24 % (ref 14–44)
MCH RBC QN AUTO: 30.8 PG (ref 26.8–34.3)
MCHC RBC AUTO-ENTMCNC: 35.6 G/DL (ref 31.4–37.4)
MCV RBC AUTO: 87 FL (ref 82–98)
MONOCYTES # BLD AUTO: 0.42 THOUSAND/ÂΜL (ref 0.17–1.22)
MONOCYTES NFR BLD AUTO: 7 % (ref 4–12)
NEUTROPHILS # BLD AUTO: 3.64 THOUSANDS/ÂΜL (ref 1.85–7.62)
NEUTS SEG NFR BLD AUTO: 61 % (ref 43–75)
NONHDLC SERPL-MCNC: 132 MG/DL
NRBC BLD AUTO-RTO: 0 /100 WBCS
PLATELET # BLD AUTO: 184 THOUSANDS/UL (ref 149–390)
PMV BLD AUTO: 9.9 FL (ref 8.9–12.7)
POTASSIUM SERPL-SCNC: 3.7 MMOL/L (ref 3.5–5.3)
PROT SERPL-MCNC: 8 G/DL (ref 6.4–8.4)
RBC # BLD AUTO: 5.33 MILLION/UL (ref 3.88–5.62)
SODIUM SERPL-SCNC: 139 MMOL/L (ref 135–147)
T4 FREE SERPL-MCNC: 1.01 NG/DL (ref 0.76–1.46)
TRIGL SERPL-MCNC: 57 MG/DL
TSH SERPL DL<=0.05 MIU/L-ACNC: 1.48 UIU/ML (ref 0.45–4.5)
WBC # BLD AUTO: 5.88 THOUSAND/UL (ref 4.31–10.16)

## 2023-08-07 DIAGNOSIS — G89.29 CHRONIC HEADACHE: ICD-10-CM

## 2023-08-07 DIAGNOSIS — R51.9 CHRONIC HEADACHE: ICD-10-CM

## 2023-08-07 RX ORDER — NORTRIPTYLINE HYDROCHLORIDE 10 MG/1
CAPSULE ORAL
Qty: 90 CAPSULE | Refills: 3 | Status: SHIPPED | OUTPATIENT
Start: 2023-08-07

## 2023-09-01 NOTE — PLAN OF CARE
Approving, but needs appt for additional refills.    Problem: Prexisting or High Potential for Compromised Skin Integrity  Goal: Skin integrity is maintained or improved  Description  INTERVENTIONS:  - Identify patients at risk for skin breakdown  - Assess and monitor skin integrity  - Assess and monitor nutrition and hydration status  - Monitor labs   - Assess for incontinence   - Turn and reposition patient  - Assist with mobility/ambulation  - Relieve pressure over bony prominences  - Avoid friction and shearing  - Provide appropriate hygiene as needed including keeping skin clean and dry  - Evaluate need for skin moisturizer/barrier cream  - Collaborate with interdisciplinary team   - Patient/family teaching  - Consider wound care consult   Outcome: Progressing     Problem: Potential for Falls  Goal: Patient will remain free of falls  Description  INTERVENTIONS:  - Assess patient frequently for physical needs  -  Identify cognitive and physical deficits and behaviors that affect risk of falls  -  [place] fall precautions as indicated by assessment   - Educate patient/family on patient safety including physical limitations  - Instruct patient to call for assistance with activity based on assessment  - Modify environment to reduce risk of injury  - Consider OT/PT consult to assist with strengthening/mobility  Outcome: Progressing     Problem: Neurological Deficit  Goal: Neurological status is stable or improving  Description  Interventions:  - Monitor and assess patient's level of consciousness, motor function, sensory function, and level of assistance needed for ADLs  - Monitor and report changes from baseline  Collaborate with interdisciplinary team to initiate plan and implement interventions as ordered  - Provide and maintain a safe environment  - Consider seizure precautions  - Consider fall precautions  - Consider aspiration precautions  - Consider bleeding precautions  Outcome: Progressing     Problem:  Activity Intolerance/Impaired Mobility  Goal: Mobility/activity is maintained at optimum level for patient  Description  Interventions:  - Assess and monitor patient  barriers to mobility and need for assistive/adaptive devices  - Assess patient's emotional response to limitations  - Collaborate with interdisciplinary team and initiate plans and interventions as ordered  - Encourage independent activity per ability   - Maintain proper body alignment  - Perform active/passive rom as tolerated/ordered  - Plan activities to conserve energy   - Turn patient as appropriate  Outcome: Progressing     Problem: Communication Impairment  Goal: Ability to express needs and understand communication  Description  Assess patient's communication skills and ability to understand information  Patient will demonstrate use of effective communication techniques, alternative methods of communication and understanding even if not able to speak  - Encourage communication and provide alternate methods of communication as needed  - Collaborate with case management/ for discharge needs  - Include patient/family/caregiver in decisions related to communication  Outcome: Progressing     Problem: Potential for Aspiration  Goal: Non-ventilated patient's risk of aspiration is minimized  Description  Assess and monitor vital signs, respiratory status, and labs (WBC)  Monitor for signs of aspiration (tachypnea, cough, rales, wheezing, cyanosis, fever)  - Assess and monitor patient's ability to swallow  - Place patient up in chair to eat if possible  - HOB up at 90 degrees to eat if unable to get patient up into chair   - Supervise patient during oral intake  - Instruct patient/ family to take small bites  - Instruct patient/ family to take small single sips when taking liquids    - Follow patient-specific strategies generated by speech pathologist   Outcome: Progressing     Problem: Nutrition  Goal: Nutrition/Hydration status is improving  Description  Monitor and assess patient's nutrition/hydration status for malnutrition (ex- brittle hair, bruises, dry skin, pale skin and conjunctiva, muscle wasting, smooth red tongue, and disorientation)  Collaborate with interdisciplinary team and initiate plan and interventions as ordered  Monitor patient's weight and dietary intake as ordered or per policy  Utilize nutrition screening tool and intervene per policy  Determine patient's food preferences and provide high-protein, high-caloric foods as appropriate  - Assist patient with eating   - Allow adequate time for meals   - Encourage patient to take dietary supplement as ordered  - Collaborate with clinical nutritionist   - Include patient/family/caregiver in decisions related to nutrition    Outcome: Progressing     Problem: NEUROSENSORY - ADULT  Goal: Achieves stable or improved neurological status  Description  INTERVENTIONS  - Monitor and report changes in neurological status  - Monitor vital signs such as temperature, blood pressure, glucose, and any other labs ordered   - Monitor for seizure activity and implement precautions if appropriate       Outcome: Progressing  Goal: Remains free of injury related to seizures activity  Description  INTERVENTIONS  - Maintain airway, patient safety  and administer oxygen as ordered  - Monitor patient for seizure activity, document and report duration and description of seizure to physician/advanced practitioner  - If seizure occurs,  ensure patient safety during seizure  - Reorient patient post seizure  - Instruct patient/family to notify RN of any seizure activity including if an aura is experienced  - Instruct patient/family to call for assistance with activity based on nursing assessment     Outcome: Progressing  Goal: Achieves maximal functionality and self care  Description  INTERVENTIONS  - Monitor swallowing and airway patency with patient fatigue and changes in neurological status  - Encourage and assist patient to increase activity and self care     - Encourage visually impaired, hearing impaired and aphasic patients to use assistive/communication devices  Outcome: Progressing     Problem: CARDIOVASCULAR - ADULT  Goal: Maintains optimal cardiac output and hemodynamic stability  Description  INTERVENTIONS:  - Monitor I/O, vital signs and rhythm  - Monitor for S/S and trends of decreased cardiac output  - Administer and titrate ordered vasoactive medications to optimize hemodynamic stability  - Assess quality of pulses, skin color and temperature  - Assess for signs of decreased coronary artery perfusion  - Instruct patient to report change in severity of symptoms  Outcome: Progressing  Goal: Absence of cardiac dysrhythmias or at baseline rhythm  Description  INTERVENTIONS:  - Continuous cardiac monitoring, vital signs, obtain 12 lead EKG if ordered  - Administer antiarrhythmic and heart rate control medications as ordered  - Monitor electrolytes and administer replacement therapy as ordered  Outcome: Progressing     Problem: COPING  Goal: Pt/Family able to verbalize concerns and demonstrate effective coping strategies  Description  INTERVENTIONS:  - Assist patient/family to identify coping skills, available support systems and cultural and spiritual values  - Provide emotional support, including active listening and acknowledgement of concerns of patient and caregivers  - Reduce environmental stimuli, as able  - Provide patient education  - Assess for spiritual pain/suffering and initiate spiritual care, including notification of Pastoral Care or angelia based community as needed  - Assess effectiveness of coping strategies  Outcome: Progressing  Goal: Will report anxiety at manageable levels  Description  INTERVENTIONS:  - Administer medication as ordered  - Teach and encourage coping skills  - Provide emotional support  - Assess patient/family for anxiety and ability to cope  Outcome: Progressing     Problem: CONFUSION/THOUGHT DISTURBANCE  Goal: Thought disturbances (confusion, delirium, depression, dementia or psychosis) are managed to maintain or return to baseline mental status and functional level  Description  INTERVENTIONS:  - Assess for possible contributors to  thought disturbance, including but not limited to medications, infection, impaired vision or hearing, underlying metabolic abnormalities, dehydration, respiratory compromise,  psychiatric diagnoses and notify attending PHYSICAN/AP  - Monitor and intervene to maintain adequate nutrition, hydration, elimination, sleep and activity  - Decrease environmental stimuli, including noise as appropriate  - Provide frequent contacts to provide refocusing, direction and reassurance as needed  Approach patient calmly with eye contact and at their level  - [place] high risk fall precautions, aspiration precautions and other safety measures, as indicated  - If delirium suspected, notify physician/AP of change in condition and request immediate in-person evaluation  - Pursue consults as appropriate including Geriatric (campus dependent), OT for cognitive evaluation/activity planning, psychiatric, pastoral care, etc   Outcome: Progressing     Problem: Nutrition/Hydration-ADULT  Goal: Nutrient/Hydration intake appropriate for improving, restoring or maintaining nutritional needs  Description  Monitor and assess patient's nutrition/hydration status for malnutrition  Collaborate with interdisciplinary team and initiate plan and interventions as ordered  Monitor patient's weight and dietary intake as ordered or per policy  Utilize nutrition screening tool and intervene as necessary  Determine patient's food preferences and provide high-protein, high-caloric foods as appropriate       INTERVENTIONS:  - Monitor oral intake, urinary output, labs, and treatment plans  - Assess nutrition and hydration status and recommend course of action  - Evaluate amount of meals eaten  - Assist patient with eating if necessary   - Allow adequate time for meals  - Recommend/ encourage appropriate diets, oral nutritional supplements, and vitamin/mineral supplements  - Order, calculate, and assess calorie counts as needed  - Recommend, monitor, and adjust tube feedings and TPN/PPN based on assessed needs  - Assess need for intravenous fluids  - Provide specific nutrition/hydration education as appropriate  - Include patient/family/caregiver in decisions related to nutrition  Outcome: Progressing     Problem: PAIN - ADULT  Goal: Verbalizes/displays adequate comfort level or baseline comfort level  Description  Interventions:  - Encourage patient to monitor pain and request assistance  - Assess pain using appropriate pain scale  - Administer analgesics based on type and severity of pain and evaluate response  - Implement non-pharmacological measures as appropriate and evaluate response  - Notify physician/advanced practitioner if interventions unsuccessful or patient reports new pain   Outcome: Progressing     Problem: INFECTION - ADULT  Goal: Absence or prevention of progression during hospitalization  Description  INTERVENTIONS:  - Assess and monitor for signs and symptoms of infection  - Monitor lab/diagnostic results  - Monitor all insertion sites, i e  indwelling lines, tubes, and drains  - Administer medications as ordered  - Instruct and encourage patient and family to use good hand hygiene technique  - Identify and instruct in appropriate isolation precautions for identified infection/condition   Outcome: Progressing     Problem: SAFETY ADULT  Goal: Patient will remain free of falls  Description  INTERVENTIONS:  - Assess patient frequently for physical needs  -  Identify cognitive and physical deficits and behaviors that affect risk of falls    -  [place] fall precautions as indicated by assessment   - Educate patient/family on patient safety including physical limitations  - Instruct patient to call for assistance with activity based on assessment  - Modify environment to reduce risk of injury  - Consider OT/PT consult to assist with strengthening/mobility  Outcome: Progressing  Goal: Maintain or return mobility status to optimal level  Description  INTERVENTIONS:  - Assess patient's baseline mobility status (ambulation, transfers, stairs, etc )    - Identify cognitive and physical deficits and behaviors that affect mobility  - Identify mobility aids required to assist with transfers and/or ambulation (gait belt, sit-to-stand, lift, walker, cane, etc )  - [place] fall precautions as indicated by assessment  - Record patient progress and toleration of activity level on Mobility SBAR; progress patient to next Phase/Stage  - Instruct patient to call for assistance with activity based on assessment  - Consider rehabilitation consult to assist with strengthening/weightbearing, etc   Outcome: Progressing     Problem: DISCHARGE PLANNING  Goal: Discharge to home or other facility with appropriate resources  Description  INTERVENTIONS:  - Identify barriers to discharge w/patient and caregiver  - Arrange for needed discharge resources and transportation as appropriate  - Identify discharge learning needs (meds, wound care, etc )  - Arrange for interpretive services to assist at discharge as needed  - Refer to Case Management Department for coordinating discharge planning if the patient needs post-hospital services based on physician/advanced practitioner order or complex needs related to functional status, cognitive ability, or social support system  Outcome: Progressing     Problem: Knowledge Deficit  Goal: Patient/family/caregiver demonstrates understanding of disease process, treatment plan, medications, and discharge instructions  Description  Complete learning assessment and assess knowledge base    Interventions:  - Provide teaching at level of understanding  - Provide teaching via preferred learning methods  Outcome: Progressing

## 2023-10-27 ENCOUNTER — OFFICE VISIT (OUTPATIENT)
Dept: FAMILY MEDICINE CLINIC | Facility: CLINIC | Age: 48
End: 2023-10-27
Payer: COMMERCIAL

## 2023-10-27 VITALS
WEIGHT: 153.4 LBS | OXYGEN SATURATION: 99 % | HEIGHT: 67 IN | BODY MASS INDEX: 24.08 KG/M2 | DIASTOLIC BLOOD PRESSURE: 84 MMHG | TEMPERATURE: 98.9 F | SYSTOLIC BLOOD PRESSURE: 120 MMHG | HEART RATE: 81 BPM

## 2023-10-27 DIAGNOSIS — I72.6 VERTEBRAL ARTERY ANEURYSM (HCC): ICD-10-CM

## 2023-10-27 DIAGNOSIS — K04.7 DENTAL ABSCESS: Primary | ICD-10-CM

## 2023-10-27 DIAGNOSIS — I61.9 INTRAPARENCHYMAL HEMORRHAGE OF BRAIN (HCC): ICD-10-CM

## 2023-10-27 DIAGNOSIS — I10 ESSENTIAL HYPERTENSION: ICD-10-CM

## 2023-10-27 DIAGNOSIS — Z23 ENCOUNTER FOR IMMUNIZATION: ICD-10-CM

## 2023-10-27 PROCEDURE — 90686 IIV4 VACC NO PRSV 0.5 ML IM: CPT

## 2023-10-27 PROCEDURE — 90471 IMMUNIZATION ADMIN: CPT

## 2023-10-27 PROCEDURE — 99213 OFFICE O/P EST LOW 20 MIN: CPT | Performed by: INTERNAL MEDICINE

## 2023-10-27 PROCEDURE — 96372 THER/PROPH/DIAG INJ SC/IM: CPT

## 2023-10-27 RX ORDER — KETOROLAC TROMETHAMINE 30 MG/ML
60 INJECTION, SOLUTION INTRAMUSCULAR; INTRAVENOUS ONCE
Status: COMPLETED | OUTPATIENT
Start: 2023-10-27 | End: 2023-10-27

## 2023-10-27 RX ORDER — NAPROXEN 500 MG/1
500 TABLET ORAL 2 TIMES DAILY WITH MEALS
Qty: 30 TABLET | Refills: 1 | Status: SHIPPED | OUTPATIENT
Start: 2023-10-27

## 2023-10-27 RX ORDER — METOPROLOL SUCCINATE 50 MG/1
50 TABLET, EXTENDED RELEASE ORAL DAILY
COMMUNITY
Start: 2023-08-07

## 2023-10-27 RX ORDER — TRAMADOL HYDROCHLORIDE 50 MG/1
50 TABLET ORAL EVERY 6 HOURS PRN
Qty: 15 TABLET | Refills: 0 | Status: SHIPPED | OUTPATIENT
Start: 2023-10-27

## 2023-10-27 RX ORDER — AMOXICILLIN 500 MG/1
500 CAPSULE ORAL 3 TIMES DAILY
Qty: 30 CAPSULE | Refills: 0 | Status: SHIPPED | OUTPATIENT
Start: 2023-10-27 | End: 2023-11-06

## 2023-10-27 RX ADMIN — KETOROLAC TROMETHAMINE 60 MG: 30 INJECTION, SOLUTION INTRAMUSCULAR; INTRAVENOUS at 13:29

## 2023-10-27 NOTE — PROGRESS NOTES
Name: Tyrese Ramirez      : 1975      MRN: [id number]  Encounter Provider: Ruthie Macario DO  Encounter Date: 10/27/2023   Encounter department: [address] [facility]IMARY [facility]    Assessment & Plan     1. Dental abscess  Assessment & Plan:  Symptoms now 48 hours and seem to be increasing. Upper molar area. Does not have a dentist.  Tran Melissa to be abscess. Will treat with amoxicillin 500 3 times daily, tramadol for severe pain and Naprosyn twice daily. Referral to dentistry for next week if symptoms fail to improve. He should establish with a dentist.    Orders:  -     traMADol (Ultram) 50 mg tablet; Take 1 tablet (50 mg total) by mouth every 6 (six) hours as needed for moderate pain for up to 15 doses  -     Ambulatory referral to [address] [place] [address]; Future  -     amoxicillin (AMOXIL) 500 mg capsule; Take 1 capsule (500 mg total) by mouth 3 (three) times a day for 10 days  -     ketorolac (TORADOL) 60 mg/2 mL IM injection 60 mg    2. Essential hypertension  Assessment & Plan:  Any current medical therapy    Orders:  -     naproxen (Naprosyn) 500 mg tablet; Take 1 tablet (500 mg total) by mouth 2 (two) times a day with meals    3. Vertebral artery aneurysm [facility])  Assessment & Plan: This is being followed by neurosurgery. 4. 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 Subjective      Pain started on Wednesday, slowly escalating. Been using Aleve with some relief, but not complete. Has no radiation of pain. It is the upper outer mouth on the left side. No fevers no chills no sweats. Denies headache. Review of Systems   Constitutional:  Negative for chills and fever. HENT:  Positive for dental problem. Negative for rhinorrhea and sore throat. Eyes:  Negative for visual disturbance. Respiratory:  Negative for cough and shortness of breath.     Cardiovascular:  Negative for chest pain. Gastrointestinal:  Negative for diarrhea, nausea and vomiting. Genitourinary:  Negative for dysuria. Musculoskeletal:  Negative for back pain and myalgias. Skin:  Negative for rash. Neurological:  Negative for dizziness and headaches. Psychiatric/Behavioral:  Negative for confusion. All other systems reviewed and are negative. Current Outpatient Medications on File Prior to Visit   Medication Sig    amLODIPine (NORVASC) 10 mg tablet Take 1 tablet (10 mg total) by mouth daily    aspirin (ECOTRIN LOW STRENGTH) 81 mg EC tablet Take 1 tablet (81 mg total) by mouth daily    Docusate Calcium (STOOL SOFTENER PO) Take by mouth 2 (two) times a day    nortriptyline (PAMELOR) 10 mg capsule TAKE (1) CAPSULE DAILY IN THE EVENING. Triamcinolone Acetonide (NASACORT ALLERGY 24HR NA) 1 spray into each nostril daily    metoprolol succinate (TOPROL-XL) 50 mg 24 hr tablet Take 50 mg by mouth daily    metoprolol tartrate (LOPRESSOR) 50 mg tablet Take 50 mg by mouth daily       Objective     /84 (BP Location: Left arm, Patient Position: Sitting, Cuff Size: Adult)   Pulse 81   Temp 98.9 °F (37.2 °C) (Tympanic)   Ht 5' 7" (1.702 m)   Wt 69.6 kg (153 lb 6.4 oz)   SpO2 99%   BMI 24.03 kg/m²     Physical Exam  Constitutional:       Appearance: Normal appearance. HENT:      Head: Normocephalic and atraumatic. Right Ear: Tympanic membrane normal.      Left Ear: Tympanic membrane normal.      Nose: No congestion or rhinorrhea. Neck:      Vascular: No carotid bruit. Comments: No cervical adenopathy  Cardiovascular:      Rate and Rhythm: Normal rate and regular rhythm. Heart sounds: No murmur heard. Pulmonary:      Effort: Pulmonary effort is normal. No respiratory distress. Breath sounds: Normal breath sounds. No wheezing. Chest:      Chest wall: No tenderness. Abdominal:      General: There is no distension. Tenderness: There is no abdominal tenderness. Hernia: No hernia is present. Musculoskeletal:         General: No swelling or tenderness. Right lower leg: No edema. Left lower leg: No edema. Lymphadenopathy:      Cervical: No cervical adenopathy. Skin:     Findings: No rash. Neurological:      General: No focal deficit present. Mental Status: He is alert and oriented to person, place, and time. Sensory: No sensory deficit.    Psychiatric:         Mood and Affect: Mood normal.         Behavior: Behavior normal.       Mohit Samayoa, DO

## 2023-10-27 NOTE — ASSESSMENT & PLAN NOTE
Symptoms now 48 hours and seem to be increasing. Upper molar area. Does not have a dentist.  Judi Armenta to be abscess. Will treat with amoxicillin 500 3 times daily, tramadol for severe pain and Naprosyn twice daily. Referral to dentistry for next week if symptoms fail to improve.   He should establish with a dentist.

## 2023-11-06 DIAGNOSIS — I10 ESSENTIAL HYPERTENSION: ICD-10-CM

## 2023-11-06 RX ORDER — AMLODIPINE BESYLATE 10 MG/1
10 TABLET ORAL DAILY
Qty: 90 TABLET | Refills: 3 | Status: SHIPPED | OUTPATIENT
Start: 2023-11-06

## 2023-11-06 RX ORDER — METOPROLOL SUCCINATE 50 MG/1
50 TABLET, EXTENDED RELEASE ORAL
Qty: 90 TABLET | Refills: 3 | Status: SHIPPED | OUTPATIENT
Start: 2023-11-06

## 2024-03-28 ENCOUNTER — OFFICE VISIT (OUTPATIENT)
Dept: FAMILY MEDICINE CLINIC | Facility: CLINIC | Age: 49
End: 2024-03-28
Payer: COMMERCIAL

## 2024-03-28 ENCOUNTER — APPOINTMENT (OUTPATIENT)
Age: 49
End: 2024-03-28
Payer: COMMERCIAL

## 2024-03-28 VITALS
HEART RATE: 64 BPM | TEMPERATURE: 97.8 F | HEIGHT: 67 IN | BODY MASS INDEX: 24.03 KG/M2 | DIASTOLIC BLOOD PRESSURE: 78 MMHG | OXYGEN SATURATION: 98 % | SYSTOLIC BLOOD PRESSURE: 132 MMHG

## 2024-03-28 DIAGNOSIS — I10 ESSENTIAL HYPERTENSION: ICD-10-CM

## 2024-03-28 DIAGNOSIS — I61.9 INTRAPARENCHYMAL HEMORRHAGE OF BRAIN (HCC): ICD-10-CM

## 2024-03-28 DIAGNOSIS — Z12.11 SCREENING FOR COLON CANCER: ICD-10-CM

## 2024-03-28 DIAGNOSIS — E83.42 HYPOMAGNESEMIA: ICD-10-CM

## 2024-03-28 DIAGNOSIS — Z00.00 ENCOUNTER FOR ANNUAL PHYSICAL EXAM: Primary | ICD-10-CM

## 2024-03-28 DIAGNOSIS — D69.6 THROMBOCYTOPENIA (HCC): ICD-10-CM

## 2024-03-28 DIAGNOSIS — E55.9 VITAMIN D DEFICIENCY: ICD-10-CM

## 2024-03-28 DIAGNOSIS — K04.7 DENTAL ABSCESS: ICD-10-CM

## 2024-03-28 DIAGNOSIS — R00.1 JUNCTIONAL BRADYCARDIA: ICD-10-CM

## 2024-03-28 LAB
25(OH)D3 SERPL-MCNC: 50 NG/ML (ref 30–100)
ALBUMIN SERPL BCP-MCNC: 4.6 G/DL (ref 3.5–5)
ALP SERPL-CCNC: 63 U/L (ref 34–104)
ALT SERPL W P-5'-P-CCNC: 24 U/L (ref 7–52)
ANION GAP SERPL CALCULATED.3IONS-SCNC: 9 MMOL/L (ref 4–13)
AST SERPL W P-5'-P-CCNC: 22 U/L (ref 13–39)
BASOPHILS # BLD AUTO: 0.03 THOUSANDS/ÂΜL (ref 0–0.1)
BASOPHILS NFR BLD AUTO: 1 % (ref 0–1)
BILIRUB SERPL-MCNC: 0.84 MG/DL (ref 0.2–1)
BUN SERPL-MCNC: 16 MG/DL (ref 5–25)
CALCIUM SERPL-MCNC: 9.4 MG/DL (ref 8.4–10.2)
CHLORIDE SERPL-SCNC: 102 MMOL/L (ref 96–108)
CHOLEST SERPL-MCNC: 198 MG/DL
CO2 SERPL-SCNC: 29 MMOL/L (ref 21–32)
CREAT SERPL-MCNC: 1.1 MG/DL (ref 0.6–1.3)
EOSINOPHIL # BLD AUTO: 0.32 THOUSAND/ÂΜL (ref 0–0.61)
EOSINOPHIL NFR BLD AUTO: 6 % (ref 0–6)
ERYTHROCYTE [DISTWIDTH] IN BLOOD BY AUTOMATED COUNT: 12.3 % (ref 11.6–15.1)
GFR SERPL CREATININE-BSD FRML MDRD: 79 ML/MIN/1.73SQ M
GLUCOSE P FAST SERPL-MCNC: 78 MG/DL (ref 65–99)
HCT VFR BLD AUTO: 45 % (ref 36.5–49.3)
HDLC SERPL-MCNC: 52 MG/DL
HGB BLD-MCNC: 15.7 G/DL (ref 12–17)
IMM GRANULOCYTES # BLD AUTO: 0.01 THOUSAND/UL (ref 0–0.2)
IMM GRANULOCYTES NFR BLD AUTO: 0 % (ref 0–2)
LDLC SERPL CALC-MCNC: 133 MG/DL (ref 0–100)
LYMPHOCYTES # BLD AUTO: 1.44 THOUSANDS/ÂΜL (ref 0.6–4.47)
LYMPHOCYTES NFR BLD AUTO: 28 % (ref 14–44)
MAGNESIUM SERPL-MCNC: 2.2 MG/DL (ref 1.9–2.7)
MCH RBC QN AUTO: 30.7 PG (ref 26.8–34.3)
MCHC RBC AUTO-ENTMCNC: 34.9 G/DL (ref 31.4–37.4)
MCV RBC AUTO: 88 FL (ref 82–98)
MONOCYTES # BLD AUTO: 0.39 THOUSAND/ÂΜL (ref 0.17–1.22)
MONOCYTES NFR BLD AUTO: 8 % (ref 4–12)
NEUTROPHILS # BLD AUTO: 2.97 THOUSANDS/ÂΜL (ref 1.85–7.62)
NEUTS SEG NFR BLD AUTO: 57 % (ref 43–75)
NONHDLC SERPL-MCNC: 146 MG/DL
NRBC BLD AUTO-RTO: 0 /100 WBCS
PLATELET # BLD AUTO: 191 THOUSANDS/UL (ref 149–390)
PMV BLD AUTO: 10.3 FL (ref 8.9–12.7)
POTASSIUM SERPL-SCNC: 3.6 MMOL/L (ref 3.5–5.3)
PROT SERPL-MCNC: 7.2 G/DL (ref 6.4–8.4)
RBC # BLD AUTO: 5.11 MILLION/UL (ref 3.88–5.62)
SODIUM SERPL-SCNC: 140 MMOL/L (ref 135–147)
TRIGL SERPL-MCNC: 63 MG/DL
TSH SERPL DL<=0.05 MIU/L-ACNC: 1.42 UIU/ML (ref 0.45–4.5)
WBC # BLD AUTO: 5.16 THOUSAND/UL (ref 4.31–10.16)

## 2024-03-28 PROCEDURE — 84443 ASSAY THYROID STIM HORMONE: CPT

## 2024-03-28 PROCEDURE — 82306 VITAMIN D 25 HYDROXY: CPT

## 2024-03-28 PROCEDURE — 85025 COMPLETE CBC W/AUTO DIFF WBC: CPT

## 2024-03-28 PROCEDURE — 99396 PREV VISIT EST AGE 40-64: CPT | Performed by: FAMILY MEDICINE

## 2024-03-28 PROCEDURE — 80061 LIPID PANEL: CPT

## 2024-03-28 PROCEDURE — 80053 COMPREHEN METABOLIC PANEL: CPT

## 2024-03-28 PROCEDURE — 83735 ASSAY OF MAGNESIUM: CPT

## 2024-03-28 PROCEDURE — 36415 COLL VENOUS BLD VENIPUNCTURE: CPT

## 2024-03-28 RX ORDER — MAGNESIUM L-LACTATE 84 MG
84 TABLET, EXTENDED RELEASE ORAL DAILY
COMMUNITY

## 2024-03-28 RX ORDER — MULTIVIT-MIN/IRON/FOLIC ACID/K 18-600-40
2 CAPSULE ORAL DAILY
COMMUNITY

## 2024-03-28 NOTE — ASSESSMENT & PLAN NOTE
For routine labs.  Also patient referred to GI for colonoscopy.  I gave him the name of primary and dental clinic  in [place] to call - hopefully will accept his insurance encourage continued good hydration as well as increased fiber in his diet and strongly encouraged his outdoor walks also advised he update his Adacel and think about the most recent COVID-vaccine.

## 2024-03-28 NOTE — ASSESSMENT & PLAN NOTE
"Fairly stable at present.  Discussed the need to continue his walks, etc.   did discuss the fact his underlying stress due to his mother's illness may be some of the reason for his intermittent memory \"lapses\" I encouraged continued brain exercises as well as physical exercises.  "

## 2024-03-28 NOTE — PROGRESS NOTES
"ADULT ANNUAL PHYSICAL  [facility]    NAME: Jose Krueger  AGE: 49 y.o. 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)     Resolved with may be slight residual effects including occasional \"imbalance\" and \"short-term memory loss\"   again no further neuro follow-up required         Essential hypertension     Controlled         Relevant Orders    Lipid panel    TSH, 3rd generation with Free T4 reflex    CBC and differential    Comprehensive metabolic panel    Vitamin D 25 hydroxy    Magnesium    Junctional bradycardia     Exam normal today and patient without symptomatology, despite chronic tx with B-Blocker         Thrombocytopenia (HCC)    Relevant Orders    CBC and differential    Encounter for annual physical exam - Primary     For routine labs.  Also patient referred to GI for colonoscopy.  I gave him the name of primary and dental clinic  in [place] to call - hopefully will accept his insurance encourage continued good hydration as well as increased fiber in his diet and strongly encouraged his outdoor walks also advised he update his Adacel and think about the most recent COVID-vaccine.        Other Visit Diagnoses     Dental abscess        Screening for colon cancer        Relevant Orders    Ambulatory referral to Gastroenterology    Vitamin D deficiency        Relevant Orders    Vitamin D 25 hydroxy    Hypomagnesemia              Immunizations and preventive care screenings were discussed with patient today. Appropriate education was printed on patient's after visit summary.        Counseling:  Dental Health: discussed importance of regular tooth brushing, flossing, and dental visits.         Return in about 1 year (around 3/28/2025).     Chief Complaint:     Chief Complaint   Patient presents with   • Annual Exam "      History of Present Illness:     Adult Annual Physical   Patient here for a comprehensive physical exam. The patient reports problems - patient complains of intermittent feeling of imbalance especially of his left leg ever since his stroke.  He denies limping when walking and and occasionally does take 3 mile walks without a problem he denies any numbness of the leg.  He denies lightheadedness or dizziness with walking and has had no falls.  Apparently this was evaluated by neurology in the past without any significant findings .  He also is concerned about occasional short-term memory lapses which she states are very infrequent .he states his long-term memory is fantastic.  He does keep his brain active doing a lot of reading especially online.  Also walks for exercise as noted.  He denies any headaches.  He is currently not working but spending a lot of time with his mom who was diagnosed with renal cancer transporting her to doctor visits etc.  He does admit to being very stressed about the situation.  Apparently has had intermittent dental abscesses but has not been to a dentist for years.  He does brushes teeth regularly.  He denies any recent tooth or mouth pain.  He is interested in routine labs and also knows he needs routine colonoscopy.  He denies any GI symptoms other than occasional constipation he thinks that due to taking vitamin D.  He does stay well-hydrated but not sure if he is getting lots of fiber in his diet.  Exercise: walking and 1-2 times a week on average.      Depression Screening  PHQ-2/9 Depression Screening    Little interest or pleasure in doing things: 0 - not at all  Feeling down, depressed, or hopeless: 0 - not at all  Trouble falling or staying asleep, or sleeping too much: 0 - not at all  Feeling tired or having little energy: 0 - not at all  Poor appetite or overeatin - not at all  Feeling bad about yourself - or that you are a failure or have let yourself or your family  down: 0 - not at all  Trouble concentrating on things, such as reading the newspaper or watching television: 0 - not at all  Moving or speaking so slowly that other people could have noticed. Or the opposite - being so fidgety or restless that you have been moving around a lot more than usual: 0 - not at all  Thoughts that you would be better off dead, or of hurting yourself in some way: 0 - not at all  PHQ-9 Score: 0  PHQ-9 Interpretation: No or Minimal depression       General Health  Sleep: sleeps well.   Hearing: normal - bilateral.  Vision: no vision problems.   Dental: no dental visits for >1 year and brushes teeth once daily.        Health  Symptoms include: none    Advanced Care Planning  Do you have an advanced directive? no  Do you have a durable medical power of ? no  ACP document given to patient? no     Review of Systems:     Review of Systems   Past Medical History:     Past Medical History:   Diagnosis Date   • Asthma 2008ish   • Depression    • Headache(784.0) 02/1/12    Daily headaches until 10/14/19   • Hypertension    • Migraine    • Stroke (HCC)       Past Surgical History:     Past Surgical History:   Procedure Laterality Date   • LITHOTRIPSY        Family History:     Family History   Problem Relation Age of Onset   • Atrial fibrillation Mother    • Asthma Mother    • Bone cancer Mother    • Kidney cancer Mother    • Anxiety disorder Father    • Kidney cancer Family       Social History:     Social History     Socioeconomic History   • Marital status: Single     Spouse name: None   • Number of children: None   • Years of education: None   • Highest education level: None   Occupational History   • None   Tobacco Use   • Smoking status: Never     Passive exposure: Never   • Smokeless tobacco: Never   Vaping Use   • Vaping status: Never Used   Substance and Sexual Activity   • Alcohol use: Never   • Drug use: Never   • Sexual activity: Not Currently     Partners: Female   Other Topics  "Concern   • None   Social History Narrative   • None     Social Determinants of Health     Financial Resource Strain: Not on file   Food Insecurity: Not on file   Transportation Needs: Not on file   Physical Activity: Not on file   Stress: Not on file   Social Connections: Not on file   Intimate Partner Violence: Not on file   Housing Stability: Not on file      Current Medications:     Current Outpatient Medications   Medication Sig Dispense Refill   • amLODIPine (NORVASC) 10 mg tablet TAKE 1 TABLET DAILY. 90 tablet 3   • aspirin (ECOTRIN LOW STRENGTH) 81 mg EC tablet Take 1 tablet (81 mg total) by mouth daily 30 tablet 0   • Docusate Calcium (STOOL SOFTENER PO) Take by mouth 2 (two) times a day     • magnesium (MAGTAB) 84 MG (7MEQ) TBCR Take 84 mg by mouth daily     • metoprolol succinate (TOPROL-XL) 50 mg 24 hr tablet TAKE ONE TABLET AT BEDTIME... 90 tablet 3   • nortriptyline (PAMELOR) 10 mg capsule TAKE (1) CAPSULE DAILY IN THE EVENING. 90 capsule 3   • Triamcinolone Acetonide (NASACORT ALLERGY 24HR NA) 1 spray into each nostril daily     • Vitamin D, Cholecalciferol, 50 MCG (2000 UT) CAPS Take 2 capsules by mouth in the morning       No current facility-administered medications for this visit.      Allergies:     Allergies   Allergen Reactions   • Lisinopril Fatigue   • Other      cats      Physical Exam:     /78 (BP Location: Left arm, Patient Position: Sitting, Cuff Size: Large)   Pulse 64   Temp 97.8 °F (36.6 °C)   Ht 5' 7\" (1.702 m)   SpO2 98%   BMI 24.03 kg/m²     Physical Exam  Vitals and nursing note reviewed.   Constitutional:       General: He is not in acute distress.     Appearance: He is well-developed.   HENT:      Head: Normocephalic and atraumatic.      Mouth/Throat:      Mouth: Mucous membranes are moist.      Comments: Several dental caries without evidence of abscess or gum abnormalities.  No halitosis  Eyes:      Conjunctiva/sclera: Conjunctivae normal.   Neck:      Vascular: No " carotid bruit.   Cardiovascular:      Rate and Rhythm: Normal rate and regular rhythm.      Heart sounds: No murmur heard.     Comments: Sinus rhythm with heart rate of 66  Pulmonary:      Effort: Pulmonary effort is normal. No respiratory distress.      Breath sounds: Normal breath sounds.   Abdominal:      Palpations: Abdomen is soft. There is no mass.      Tenderness: There is no abdominal tenderness.   Musculoskeletal:         General: No swelling.      Cervical back: Neck supple.      Right lower leg: No edema.      Left lower leg: No edema.   Lymphadenopathy:      Cervical: No cervical adenopathy.   Skin:     General: Skin is warm and dry.      Capillary Refill: Capillary refill takes less than 2 seconds.   Neurological:      General: No focal deficit present.      Mental Status: He is alert and oriented to person, place, and time.      Cranial Nerves: Cranial nerves 2-12 are intact.      Motor: Motor function is intact. No weakness.      Coordination: Romberg sign negative. Rapid alternating movements normal.      Deep Tendon Reflexes:      Reflex Scores:       Patellar reflexes are 2+ on the right side and 2+ on the left side.       Achilles reflexes are 1+ on the right side and 1+ on the left side.  Psychiatric:         Mood and Affect: Mood normal.          Mimi Carrillo MD  [facility]

## 2024-03-28 NOTE — ASSESSMENT & PLAN NOTE
"Resolved with may be slight residual effects including occasional \"imbalance\" and \"short-term memory loss\"   again no further neuro follow-up required  "

## 2024-04-23 ENCOUNTER — OFFICE VISIT (OUTPATIENT)
Age: 49
End: 2024-04-23
Payer: COMMERCIAL

## 2024-04-23 VITALS
BODY MASS INDEX: 25.58 KG/M2 | HEIGHT: 67 IN | SYSTOLIC BLOOD PRESSURE: 122 MMHG | WEIGHT: 163 LBS | OXYGEN SATURATION: 99 % | DIASTOLIC BLOOD PRESSURE: 82 MMHG | HEART RATE: 73 BPM | TEMPERATURE: 97.2 F

## 2024-04-23 DIAGNOSIS — K59.09 OTHER CONSTIPATION: Primary | ICD-10-CM

## 2024-04-23 DIAGNOSIS — Z11.59 NEED FOR HEPATITIS C SCREENING TEST: ICD-10-CM

## 2024-04-23 DIAGNOSIS — Z12.11 SCREENING FOR COLON CANCER: ICD-10-CM

## 2024-04-23 PROCEDURE — 99243 OFF/OP CNSLTJ NEW/EST LOW 30: CPT | Performed by: INTERNAL MEDICINE

## 2024-04-23 RX ORDER — POLYETHYLENE GLYCOL 3350 17 G/17G
17 POWDER, FOR SOLUTION ORAL DAILY
Qty: 30 EACH | Refills: 3 | Status: SHIPPED | OUTPATIENT
Start: 2024-04-23

## 2024-04-23 RX ORDER — POLYETHYLENE GLYCOL 3350, SODIUM SULFATE ANHYDROUS, SODIUM BICARBONATE, SODIUM CHLORIDE, POTASSIUM CHLORIDE 236; 22.74; 6.74; 5.86; 2.97 G/4L; G/4L; G/4L; G/4L; G/4L
4000 POWDER, FOR SOLUTION ORAL ONCE
Qty: 4000 ML | Refills: 0 | Status: SHIPPED | OUTPATIENT
Start: 2024-04-23 | End: 2024-04-23

## 2024-04-23 NOTE — PROGRESS NOTES
[facility]  Outpatient Consult  Encounter: [id number]    PATIENT INFO     Name: Jose Krueger  YOB: 1975   Age: 49 y.o.   Sex: male   MRN: [id number]    ASSESSMENT & PLAN     Jose Krueger is a 49 y.o. male with PMH hypertension, junctional bradycardia, history of IPH, anxiety, depression, vertebral artery aneurysm who presents to GI office for discussion of colon cancer screening.    Diagnoses and all orders for this visit:    Constipation  Patient reports change in bowel habits about 1 year ago which he associates with initiation of Vitamin D supplementation. Currently moves bowels every 2-3 days. Has tried OTC stool softener without improvement. Is planning to D/C vitamin D to see if symptoms improve.  Recommend initiation of Miralax as needed for daily bowel movement  Also suggest daily use for the week leading up to colonoscopy to augment preparation  Discussed adequate water and fiber intake    Screening for colon cancer  Discussed screening options. Patient agreeable to colonoscopy. Discussed procedure at length as well as risks/benefits and need for transportation. No family history.   Will plan for colonoscopy at patient's convenience once transportation can be arranged  Plan for golytely/dulcolax prep given constipation  -     Ambulatory referral to Gastroenterology  -     Colonoscopy; Future  -     polyethylene glycol (Golytely) 4000 mL solution; Take 4,000 mL by mouth once for 1 dose Take 4000 mL by mouth once for 1 dose. Use as directed    Need for hepatitis C screening test    FOLLOW-UP: PRN    HISTORY OF PRESENT ILLNESS       Jose Krueger is a 49 y.o. male with PMH hypertension, junctional bradycardia, history of IPH, anxiety, depression, vertebral artery aneurysm who presents to GI office for discussion of colon cancer screening.    Patient endorses weaning off of Vitamin D currently due to concern for it causing constipation. Started approximately one  year ago when he started Vitamin D. Moves bowels once every 3 days. Does not always fully evacuate. Denies straining and no blood in stool. Taking stool softener over the counter without significant improvement. Denies upper GI symptoms including N/V, GERD, abdominal pain, dysphagia. Nonsmoker.     Mom with renal cancer  Dad with skin cancer     ENDOSCOPIC HISTORY     UPPER ENDOSCOPY: none prior  COLONOSCOPY: none prior    REVIEW OF SYSTEMS     CONSTITUTIONAL: Denies any fever, chills, rigors, and weight loss  HEENT: No earache or tinnitus, denies hearing loss or visual disturbances  CARDIOVASCULAR: No chest pain or palpitations  RESPIRATORY: Denies any cough, hemoptysis, shortness of breath or dyspnea on exertion  GASTROINTESTINAL: As noted in the History of Present Illness  GENITOURINARY: No problems with urination, denies any hematuria or dysuria  NEUROLOGIC: No dizziness or vertigo, denies headaches   MUSCULOSKELETAL: Denies any muscle or joint pain   SKIN: Denies skin rashes or itching  ENDOCRINE: Denies excessive thirst, denies intolerance to heat or cold  PSYCHOSOCIAL: Denies depression or anxiety, denies any recent memory loss     Historical Information   Past Medical History:   Diagnosis Date    Asthma 2008ish    Depression     Headache(784.0) 02/1/12    Daily headaches until 10/14/19    Hypertension     Migraine     Stroke (HCC)      Past Surgical History:   Procedure Laterality Date    LITHOTRIPSY       Social History   Social History     Substance and Sexual Activity   Alcohol Use Never     Social History     Substance and Sexual Activity   Drug Use Never     Social History     Tobacco Use   Smoking Status Never    Passive exposure: Never   Smokeless Tobacco Never     Family History   Problem Relation Age of Onset    Atrial fibrillation Mother     Asthma Mother     Bone cancer Mother     Kidney cancer Mother     Anxiety disorder Father     Kidney cancer Family          MEDICATIONS AND ALLERGIES  "    Current Outpatient Medications   Medication Instructions    amLODIPine (NORVASC) 10 mg, Oral, Daily    aspirin (ECOTRIN LOW STRENGTH) 81 mg, Oral, Daily    Docusate Calcium (STOOL SOFTENER PO) Oral, 2 times daily    magnesium (MAGTAB) 84 mg, Oral, Daily    metoprolol succinate (TOPROL-XL) 50 mg, Oral, Daily at bedtime    nortriptyline (PAMELOR) 10 mg capsule TAKE (1) CAPSULE DAILY IN THE EVENING.    Triamcinolone Acetonide (NASACORT ALLERGY 24HR NA) 1 spray, Nasal, Daily    Vitamin D, Cholecalciferol, 50 MCG (2000 UT) CAPS 2 capsules, Oral, Daily     Allergies   Allergen Reactions    Lisinopril Fatigue    Other      cats       PHYSICAL EXAM      Objective   Blood pressure 122/82, pulse 73, temperature (!) 97.2 °F (36.2 °C), temperature source Temporal, height 5' 7\" (1.702 m), weight 73.9 kg (163 lb), SpO2 99%. Body mass index is 25.53 kg/m².    General Appearance:   Alert, cooperative, no distress   HEENT:   Normocephalic, atraumatic, anicteric     Neck:   Supple, symmetrical, trachea midline   Lungs:   Equal chest rise, respirations unlabored    Heart:   Regular rate and rhythm   Abdomen:   Soft, non-tender, non-distended; normal bowel sounds; no masses, no organomegaly    Rectal:   Deferred    Extremities:   No cyanosis, clubbing or edema    Neuro:   Moves all 4 extremities    Skin:   No jaundice, rashes, or lesions      LABORATORY RESULTS     No visits with results within 1 Day(s) from this visit.   Latest known visit with results is:   Appointment on 03/28/2024   Component Date Value    Cholesterol 03/28/2024 198     Triglycerides 03/28/2024 63     HDL, Direct 03/28/2024 52     LDL Calculated 03/28/2024 133 (H)     Non-HDL-Chol (CHOL-HDL) 03/28/2024 146     TSH 3RD GENERATON 03/28/2024 1.423     WBC 03/28/2024 5.16     RBC 03/28/2024 5.11     Hemoglobin 03/28/2024 15.7     Hematocrit 03/28/2024 45.0     MCV 03/28/2024 88     MCH 03/28/2024 30.7     MCHC 03/28/2024 34.9     RDW 03/28/2024 12.3     MPV " 03/28/2024 10.3     Platelets 03/28/2024 191     nRBC 03/28/2024 0     Segmented % 03/28/2024 57     Immature Grans % 03/28/2024 0     Lymphocytes % 03/28/2024 28     Monocytes % 03/28/2024 8     Eosinophils Relative 03/28/2024 6     Basophils Relative 03/28/2024 1     Absolute Neutrophils 03/28/2024 2.97     Absolute Immature Grans 03/28/2024 0.01     Absolute Lymphocytes 03/28/2024 1.44     Absolute Monocytes 03/28/2024 0.39     Eosinophils Absolute 03/28/2024 0.32     Basophils Absolute 03/28/2024 0.03     Sodium 03/28/2024 140     Potassium 03/28/2024 3.6     Chloride 03/28/2024 102     CO2 03/28/2024 29     ANION GAP 03/28/2024 9     BUN 03/28/2024 16     Creatinine 03/28/2024 1.10     Glucose, Fasting 03/28/2024 78     Calcium 03/28/2024 9.4     AST 03/28/2024 22     ALT 03/28/2024 24     Alkaline Phosphatase 03/28/2024 63     Total Protein 03/28/2024 7.2     Albumin 03/28/2024 4.6     Total Bilirubin 03/28/2024 0.84     eGFR 03/28/2024 79     Vit D, 25-Hydroxy 03/28/2024 50.0     Magnesium 03/28/2024 2.2      No results found.    RADIOLOGY RESULTS: I have personally reviewed pertinent imaging studies.      Louise Witt D.O.  Gastroenterology Fellow  [facility]  Division of Gastroenterology & Hepatology  Available on [employer]    ** Please Note: This note is constructed using a voice recognition dictation system. **

## 2024-07-31 DIAGNOSIS — R51.9 CHRONIC HEADACHE: ICD-10-CM

## 2024-07-31 DIAGNOSIS — G89.29 CHRONIC HEADACHE: ICD-10-CM

## 2024-07-31 DIAGNOSIS — I10 ESSENTIAL HYPERTENSION: ICD-10-CM

## 2024-08-01 RX ORDER — METOPROLOL SUCCINATE 50 MG/1
50 TABLET, EXTENDED RELEASE ORAL
Qty: 100 TABLET | Refills: 1 | Status: SHIPPED | OUTPATIENT
Start: 2024-08-01

## 2024-08-01 RX ORDER — AMLODIPINE BESYLATE 10 MG/1
10 TABLET ORAL DAILY
Qty: 100 TABLET | Refills: 1 | Status: SHIPPED | OUTPATIENT
Start: 2024-08-01

## 2024-08-01 RX ORDER — NORTRIPTYLINE HYDROCHLORIDE 10 MG/1
CAPSULE ORAL
Qty: 100 CAPSULE | Refills: 1 | Status: SHIPPED | OUTPATIENT
Start: 2024-08-01

## 2024-09-05 ENCOUNTER — VBI (OUTPATIENT)
Dept: ADMINISTRATIVE | Facility: OTHER | Age: 49
End: 2024-09-05

## 2024-09-05 NOTE — TELEPHONE ENCOUNTER
09/05/24 7:02 AM     Chart reviewed for CRC: Colonoscopy ; nothing is submitted to the patient's insurance at this time.     Merry Roland   [facility]

## 2024-09-18 ENCOUNTER — TELEPHONE (OUTPATIENT)
Dept: GASTROENTEROLOGY | Facility: CLINIC | Age: 49
End: 2024-09-18

## 2024-09-26 ENCOUNTER — ANESTHESIA EVENT (OUTPATIENT)
Dept: GASTROENTEROLOGY | Facility: HOSPITAL | Age: 49
End: 2024-09-26
Payer: COMMERCIAL

## 2024-09-26 ENCOUNTER — ANESTHESIA (OUTPATIENT)
Dept: GASTROENTEROLOGY | Facility: HOSPITAL | Age: 49
End: 2024-09-26
Payer: COMMERCIAL

## 2024-09-26 ENCOUNTER — HOSPITAL ENCOUNTER (OUTPATIENT)
Dept: GASTROENTEROLOGY | Facility: HOSPITAL | Age: 49
Setting detail: OUTPATIENT SURGERY
End: 2024-09-26
Payer: COMMERCIAL

## 2024-09-26 VITALS
TEMPERATURE: 98 F | RESPIRATION RATE: 20 BRPM | BODY MASS INDEX: 23.54 KG/M2 | DIASTOLIC BLOOD PRESSURE: 71 MMHG | SYSTOLIC BLOOD PRESSURE: 131 MMHG | HEART RATE: 91 BPM | OXYGEN SATURATION: 97 % | WEIGHT: 150 LBS | HEIGHT: 67 IN

## 2024-09-26 DIAGNOSIS — Z12.11 SCREENING FOR COLON CANCER: ICD-10-CM

## 2024-09-26 PROCEDURE — 45380 COLONOSCOPY AND BIOPSY: CPT | Performed by: INTERNAL MEDICINE

## 2024-09-26 PROCEDURE — 88305 TISSUE EXAM BY PATHOLOGIST: CPT | Performed by: PATHOLOGY

## 2024-09-26 RX ORDER — LIDOCAINE HYDROCHLORIDE 10 MG/ML
INJECTION, SOLUTION EPIDURAL; INFILTRATION; INTRACAUDAL; PERINEURAL AS NEEDED
Status: DISCONTINUED | OUTPATIENT
Start: 2024-09-26 | End: 2024-09-26

## 2024-09-26 RX ORDER — PROPOFOL 10 MG/ML
INJECTION, EMULSION INTRAVENOUS CONTINUOUS PRN
Status: DISCONTINUED | OUTPATIENT
Start: 2024-09-26 | End: 2024-09-26

## 2024-09-26 RX ORDER — SODIUM CHLORIDE, SODIUM LACTATE, POTASSIUM CHLORIDE, CALCIUM CHLORIDE 600; 310; 30; 20 MG/100ML; MG/100ML; MG/100ML; MG/100ML
INJECTION, SOLUTION INTRAVENOUS CONTINUOUS PRN
Status: DISCONTINUED | OUTPATIENT
Start: 2024-09-26 | End: 2024-09-26

## 2024-09-26 RX ORDER — SODIUM CHLORIDE, SODIUM LACTATE, POTASSIUM CHLORIDE, CALCIUM CHLORIDE 600; 310; 30; 20 MG/100ML; MG/100ML; MG/100ML; MG/100ML
50 INJECTION, SOLUTION INTRAVENOUS CONTINUOUS
Status: DISPENSED | OUTPATIENT
Start: 2024-09-26

## 2024-09-26 RX ORDER — PROPOFOL 10 MG/ML
INJECTION, EMULSION INTRAVENOUS AS NEEDED
Status: DISCONTINUED | OUTPATIENT
Start: 2024-09-26 | End: 2024-09-26

## 2024-09-26 RX ADMIN — SODIUM CHLORIDE, SODIUM LACTATE, POTASSIUM CHLORIDE, AND CALCIUM CHLORIDE 50 ML/HR: .6; .31; .03; .02 INJECTION, SOLUTION INTRAVENOUS at 08:00

## 2024-09-26 RX ADMIN — PROPOFOL 130 MCG/KG/MIN: 10 INJECTION, EMULSION INTRAVENOUS at 08:41

## 2024-09-26 RX ADMIN — SODIUM CHLORIDE, SODIUM LACTATE, POTASSIUM CHLORIDE, AND CALCIUM CHLORIDE: .6; .31; .03; .02 INJECTION, SOLUTION INTRAVENOUS at 08:37

## 2024-09-26 RX ADMIN — PROPOFOL 120 MG: 10 INJECTION, EMULSION INTRAVENOUS at 08:41

## 2024-09-26 RX ADMIN — LIDOCAINE HYDROCHLORIDE 50 MG: 10 INJECTION, SOLUTION EPIDURAL; INFILTRATION; INTRACAUDAL; PERINEURAL at 08:41

## 2024-09-26 NOTE — ANESTHESIA PREPROCEDURE EVALUATION
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         Additional Monitors:     Airway Plan:            Plan Factors-    Chart reviewed.   Existing labs reviewed. Patient summary reviewed.    Patient is not a current smoker.              Induction- intravenous.    Postoperative Plan-         Informed Consent- Anesthetic plan and risks discussed with patient.  I personally reviewed this patient with the CRNA. Discussed and agreed on the Anesthesia Plan with the CRNA..

## 2024-09-26 NOTE — H&P
"History and Physical -  [facility]  Jose Krueger 49 y.o. male MRN: [id number]                  HPI: Jose Krueger is a 49 y.o. year old male who presents for crc screening      REVIEW OF SYSTEMS: Per the HPI, and otherwise unremarkable.    Historical Information   Past Medical History:   Diagnosis Date    Asthma 2008ish    Depression     Headache(784.0) 02/1/12    Daily headaches until 10/14/19    Hypertension     Migraine     Stroke (HCC)      Past Surgical History:   Procedure Laterality Date    LITHOTRIPSY       Social History   Social History     Substance and Sexual Activity   Alcohol Use Never     Social History     Substance and Sexual Activity   Drug Use Never     Social History     Tobacco Use   Smoking Status Never    Passive exposure: Never   Smokeless Tobacco Never     Family History   Problem Relation Age of Onset    Atrial fibrillation Mother     Asthma Mother     Bone cancer Mother     Kidney cancer Mother     Anxiety disorder Father     Kidney cancer Family        Meds/Allergies       Current Outpatient Medications:     amLODIPine (NORVASC) 10 mg tablet    aspirin (ECOTRIN LOW STRENGTH) 81 mg EC tablet    Docusate Calcium (STOOL SOFTENER PO)    magnesium (MAGTAB) 84 MG (7MEQ) TBCR    metoprolol succinate (TOPROL-XL) 50 mg 24 hr tablet    nortriptyline (PAMELOR) 10 mg capsule    polyethylene glycol (MIRALAX) 17 g packet    Triamcinolone Acetonide (NASACORT ALLERGY 24HR NA)    Vitamin D, Cholecalciferol, 50 MCG (2000 UT) CAPS    polyethylene glycol (Golytely) 4000 mL solution    Current Facility-Administered Medications:     lactated ringers infusion, 50 mL/hr, Intravenous, Continuous, 50 mL/hr at 09/26/24 0800    Allergies   Allergen Reactions    Lisinopril Fatigue    Other      cats       Objective     /100   Pulse 103   Temp 98.2 °F (36.8 °C) (Tympanic)   Resp 16   Ht 5' 7\" (1.702 m)   Wt 68 kg (150 lb)   SpO2 98%   BMI 23.49 kg/m²       PHYSICAL EXAM    Gen: " NAD  Head: NCAT  CV: RRR  CHEST: not in respiratory distress  ABD: soft, NT/ND  EXT: no edema      ASSESSMENT/PLAN:  This is a 49 y.o. year old male here for colonoscopy, and he is stable and optimized for his procedure.

## 2024-09-26 NOTE — ANESTHESIA POSTPROCEDURE EVALUATION
Post-Op Assessment Note    CV Status:  Stable  Pain Score: 0    Pain management: adequate       Mental Status:  Sleepy   Hydration Status:  Euvolemic   PONV Controlled:  None   Airway Patency:  Patent     Post Op Vitals Reviewed: Yes    No anethesia notable event occurred.    Staff: CRNA               BP   125/75   Temp      Pulse  73   Resp   16   SpO2   100

## 2024-09-30 PROCEDURE — 88305 TISSUE EXAM BY PATHOLOGIST: CPT | Performed by: PATHOLOGY

## 2024-10-09 ENCOUNTER — OFFICE VISIT (OUTPATIENT)
Dept: FAMILY MEDICINE CLINIC | Facility: CLINIC | Age: 49
End: 2024-10-09
Payer: COMMERCIAL

## 2024-10-09 VITALS
HEART RATE: 74 BPM | TEMPERATURE: 95.7 F | BODY MASS INDEX: 25.43 KG/M2 | WEIGHT: 162 LBS | DIASTOLIC BLOOD PRESSURE: 80 MMHG | HEIGHT: 67 IN | SYSTOLIC BLOOD PRESSURE: 140 MMHG | OXYGEN SATURATION: 98 %

## 2024-10-09 DIAGNOSIS — K52.9 ILEOCOLITIS: ICD-10-CM

## 2024-10-09 DIAGNOSIS — L98.9 SKIN LESIONS: Primary | ICD-10-CM

## 2024-10-09 PROCEDURE — 99213 OFFICE O/P EST LOW 20 MIN: CPT | Performed by: FAMILY MEDICINE

## 2024-10-09 NOTE — ASSESSMENT & PLAN NOTE
Per biopsy very focal most likely related to patient's symptoms 4 days prior to colonoscopy with complete resolution of symptoms.  I discussed symptoms to watch for and as long as with chronic symptoms as noted I do not feel he needs follow-up with GI.  Did advise him to try to correlate types of food intake prior to the visit the episodic symptoms he has at present.

## 2024-10-09 NOTE — PROGRESS NOTES
Ambulatory Visit  Name: Jose Krueger      : 1975      MRN: [id number]  Encounter Provider: Mimi Carrillo MD  Encounter Date: 10/9/2024   Encounter department: [facility]    Assessment & Plan  Skin lesions  Lesion most concerning is over his forehead other lesions likely benign.  Will schedule repeat visit for curettage removal of preauricular lesions; likely cauterization of the left nasolabial fold lesion; and curettage of right forehead lesion          Ileocolitis  Per biopsy very focal most likely related to patient's symptoms 4 days prior to colonoscopy with complete resolution of symptoms.  I discussed symptoms to watch for and as long as with chronic symptoms as noted I do not feel he needs follow-up with GI.  Did advise him to try to correlate types of food intake prior to the visit the episodic symptoms he has at present.          History of Present Illness     Patient made appointment to evaluate facial lesions being very concerned with his dad having precancerous lesion of his right ear.  Patient states lesions in front of his right ear as well as lesion over the left labial fold have been there for a while and without change.  He does have a small brown luis over his right forehead which is new however without change.    Since here he also wishes to review recent colonoscopy.  Apparently he had an episode approximately 4 days before his colonoscopy of abrupt severe abdominal pain with diarrhea and sweats and chills without hematochezia, melena, or mucus and without nausea or vomiting.  Symptoms resolved within a day therefore felt he was fine for his colonoscopy.  He is wondering if findings of the colonoscopy could be explained by the symptoms.  States intermittently for quite some time he will get the feeling of need to have a bowel movement but never has a bowel movement.  States that this occurs sporadically and very infrequently to this point has not been able  "to correlate it with any specific foods.  He states his bowel movements are otherwise normal usually occurring every 2 to 3 days without need to strain and without hematochezia or melena.  He denies any upper GI symptoms          Review of Systems        Objective     /80 (BP Location: Left arm, Patient Position: Sitting, Cuff Size: Standard)   Pulse 74   Temp (!) 95.7 °F (35.4 °C) (Tympanic)   Ht 5' 7\" (1.702 m)   Wt 73.5 kg (162 lb)   SpO2 98%   BMI 25.37 kg/m²     Physical Exam  Skin:               "

## 2024-10-09 NOTE — ASSESSMENT & PLAN NOTE
Lesion most concerning is over his forehead other lesions likely benign.  Will schedule repeat visit for curettage removal of preauricular lesions; likely cauterization of the left nasolabial fold lesion; and curettage of right forehead lesion

## 2024-10-29 ENCOUNTER — VBI (OUTPATIENT)
Dept: ADMINISTRATIVE | Facility: OTHER | Age: 49
End: 2024-10-29

## 2024-10-29 ENCOUNTER — PROCEDURE VISIT (OUTPATIENT)
Dept: FAMILY MEDICINE CLINIC | Facility: CLINIC | Age: 49
End: 2024-10-29
Payer: COMMERCIAL

## 2024-10-29 VITALS
BODY MASS INDEX: 25.93 KG/M2 | TEMPERATURE: 97.8 F | OXYGEN SATURATION: 98 % | WEIGHT: 165.2 LBS | HEART RATE: 79 BPM | DIASTOLIC BLOOD PRESSURE: 68 MMHG | HEIGHT: 67 IN | SYSTOLIC BLOOD PRESSURE: 118 MMHG

## 2024-10-29 DIAGNOSIS — Z23 ENCOUNTER FOR IMMUNIZATION: Primary | ICD-10-CM

## 2024-10-29 DIAGNOSIS — L98.9 SKIN LESIONS: ICD-10-CM

## 2024-10-29 DIAGNOSIS — H93.90 EAR LESION: ICD-10-CM

## 2024-10-29 PROCEDURE — 90715 TDAP VACCINE 7 YRS/> IM: CPT

## 2024-10-29 PROCEDURE — 90471 IMMUNIZATION ADMIN: CPT

## 2024-10-29 PROCEDURE — 11310 SHAVE SKIN LESION 0.5 CM/<: CPT | Performed by: FAMILY MEDICINE

## 2024-10-29 PROCEDURE — 88305 TISSUE EXAM BY PATHOLOGIST: CPT | Performed by: PATHOLOGY

## 2024-10-29 NOTE — PROGRESS NOTES
Shave lesion    Date/Time: 10/29/2024 8:00 AM    Performed by: Mimi Carrillo MD  Authorized by: Mimi Carrillo MD  Universal Protocol:  procedure performed by consultantConsent: Verbal consent obtained.  Risks and benefits: risks, benefits and alternatives were discussed  Consent given by: patient  Patient understanding: patient states understanding of the procedure being performed  Patient consent: the patient's understanding of the procedure matches consent given  Procedure consent: procedure consent matches procedure scheduled  Relevant documents: relevant documents not present or verified  Test results: test results not available  Site marked: the operative site was marked  Radiology Images displayed and confirmed. If images not available, report reviewed: imaging studies not available  Patient identity confirmed: verbally with patient    Number of Lesions: 4  Lesion 1:     Body area: head/neck    Head/neck location: R cheek    Initial size (mm): 20    Final defect size (mm): 0    Malignancy: benign lesion      Cosmetic: yes    Lesion 2:     Body area: head/neck    Head/neck location: R cheek    Initial size (mm): 10    Final defect size (mm): 0    Malignancy: benign lesion      Cosmetic: yes    Lesion 3:     Body area: head/neck    Head/neck location: forehead    Initial size (mm): 10    Final defect size (mm): 0    Malignancy: malignancy unknown    Lesion 4:     Body area: head/neck    Head/neck location: L cheek    Initial size (mm): 50    Final defect size (mm): 0    Malignancy: benign lesion      Destruction method comment: cautery    Cosmetic: yes      Comments:  Areas cleansed with Betadine and alcohol and numbed with 2% Xylocaine with epi.  Curetted areas cauterized.  Forehead lesion sent for evaluation.  Antibiotic ointment and bandage applied.  Patient can continue antibiotic ointment at home and discussed any signs of infections to watch for.        Referral was made to ENT to the lesion  located on tragus of right ear

## 2024-10-29 NOTE — TELEPHONE ENCOUNTER
10/29/24 10:50 AM     Chart reviewed for BP ; nothing is submitted to the patient's insurance at this time.     Sylvester Fowler MA   [facility]

## 2024-11-01 ENCOUNTER — TELEPHONE (OUTPATIENT)
Dept: FAMILY MEDICINE CLINIC | Facility: CLINIC | Age: 49
End: 2024-11-01

## 2024-11-01 PROCEDURE — 88305 TISSUE EXAM BY PATHOLOGIST: CPT | Performed by: PATHOLOGY

## 2024-11-01 NOTE — TELEPHONE ENCOUNTER
A. Relayed results to (patient/patient representative as listed on communication consent form) as per provider message. Patient/Patient Representative expressed understanding and did not have any further questions.                                                              B. If addending telephone encounter created by office, sign and close. If no existing encounter, Route to OFFICE CLINICAL POOL with request to: Please complete Result Management task.

## 2024-11-01 NOTE — TELEPHONE ENCOUNTER
----- Message from Mimi Carrillo MD sent at 11/1/2024 12:58 PM EDT -----  Lesion benign.  If lesion does recur please let me know

## 2025-01-24 DIAGNOSIS — R51.9 CHRONIC HEADACHE: ICD-10-CM

## 2025-01-24 DIAGNOSIS — G89.29 CHRONIC HEADACHE: ICD-10-CM

## 2025-01-24 RX ORDER — NORTRIPTYLINE HYDROCHLORIDE 10 MG/1
CAPSULE ORAL
Qty: 100 CAPSULE | Refills: 1 | Status: SHIPPED | OUTPATIENT
Start: 2025-01-24

## 2025-04-04 ENCOUNTER — TELEPHONE (OUTPATIENT)
Age: 50
End: 2025-04-04

## 2025-04-04 ENCOUNTER — OFFICE VISIT (OUTPATIENT)
Dept: FAMILY MEDICINE CLINIC | Facility: CLINIC | Age: 50
End: 2025-04-04
Payer: COMMERCIAL

## 2025-04-04 ENCOUNTER — APPOINTMENT (OUTPATIENT)
Age: 50
End: 2025-04-04
Payer: COMMERCIAL

## 2025-04-04 VITALS
TEMPERATURE: 97.4 F | OXYGEN SATURATION: 97 % | SYSTOLIC BLOOD PRESSURE: 138 MMHG | HEIGHT: 68 IN | DIASTOLIC BLOOD PRESSURE: 84 MMHG | WEIGHT: 169.4 LBS | HEART RATE: 76 BPM | BODY MASS INDEX: 25.67 KG/M2

## 2025-04-04 DIAGNOSIS — Z11.4 SCREENING FOR HIV (HUMAN IMMUNODEFICIENCY VIRUS): ICD-10-CM

## 2025-04-04 DIAGNOSIS — Z11.59 NEED FOR HEPATITIS C SCREENING TEST: ICD-10-CM

## 2025-04-04 DIAGNOSIS — R73.9 ELEVATED BLOOD SUGAR: ICD-10-CM

## 2025-04-04 DIAGNOSIS — I10 ESSENTIAL HYPERTENSION: ICD-10-CM

## 2025-04-04 DIAGNOSIS — Z11.4 SCREENING FOR HIV (HUMAN IMMUNODEFICIENCY VIRUS): Primary | ICD-10-CM

## 2025-04-04 DIAGNOSIS — I61.9 INTRAPARENCHYMAL HEMORRHAGE OF BRAIN (HCC): ICD-10-CM

## 2025-04-04 DIAGNOSIS — Z00.00 ANNUAL PHYSICAL EXAM: Primary | ICD-10-CM

## 2025-04-04 DIAGNOSIS — I72.6 VERTEBRAL ARTERY ANEURYSM (HCC): ICD-10-CM

## 2025-04-04 DIAGNOSIS — E78.2 MIXED HYPERLIPIDEMIA: ICD-10-CM

## 2025-04-04 LAB
ALBUMIN SERPL BCG-MCNC: 4.8 G/DL (ref 3.5–5)
ALP SERPL-CCNC: 77 U/L (ref 34–104)
ALT SERPL W P-5'-P-CCNC: 29 U/L (ref 7–52)
ANION GAP SERPL CALCULATED.3IONS-SCNC: 7 MMOL/L (ref 4–13)
AST SERPL W P-5'-P-CCNC: 20 U/L (ref 13–39)
BASOPHILS # BLD AUTO: 0.03 THOUSANDS/ÂΜL (ref 0–0.1)
BASOPHILS NFR BLD AUTO: 1 % (ref 0–1)
BILIRUB SERPL-MCNC: 0.85 MG/DL (ref 0.2–1)
BUN SERPL-MCNC: 14 MG/DL (ref 5–25)
CALCIUM SERPL-MCNC: 9.6 MG/DL (ref 8.4–10.2)
CHLORIDE SERPL-SCNC: 102 MMOL/L (ref 96–108)
CHOLEST SERPL-MCNC: 200 MG/DL (ref ?–200)
CO2 SERPL-SCNC: 31 MMOL/L (ref 21–32)
CREAT SERPL-MCNC: 1.09 MG/DL (ref 0.6–1.3)
EOSINOPHIL # BLD AUTO: 0.32 THOUSAND/ÂΜL (ref 0–0.61)
EOSINOPHIL NFR BLD AUTO: 6 % (ref 0–6)
ERYTHROCYTE [DISTWIDTH] IN BLOOD BY AUTOMATED COUNT: 12.2 % (ref 11.6–15.1)
EST. AVERAGE GLUCOSE BLD GHB EST-MCNC: 85 MG/DL
GFR SERPL CREATININE-BSD FRML MDRD: 79 ML/MIN/1.73SQ M
GLUCOSE P FAST SERPL-MCNC: 87 MG/DL (ref 65–99)
HBA1C MFR BLD: 4.6 %
HCT VFR BLD AUTO: 45.2 % (ref 36.5–49.3)
HDLC SERPL-MCNC: 46 MG/DL
HGB BLD-MCNC: 16.2 G/DL (ref 12–17)
IMM GRANULOCYTES # BLD AUTO: 0.02 THOUSAND/UL (ref 0–0.2)
IMM GRANULOCYTES NFR BLD AUTO: 0 % (ref 0–2)
LDLC SERPL CALC-MCNC: 141 MG/DL (ref 0–100)
LYMPHOCYTES # BLD AUTO: 1.61 THOUSANDS/ÂΜL (ref 0.6–4.47)
LYMPHOCYTES NFR BLD AUTO: 29 % (ref 14–44)
MCH RBC QN AUTO: 30.9 PG (ref 26.8–34.3)
MCHC RBC AUTO-ENTMCNC: 35.8 G/DL (ref 31.4–37.4)
MCV RBC AUTO: 86 FL (ref 82–98)
MONOCYTES # BLD AUTO: 0.41 THOUSAND/ÂΜL (ref 0.17–1.22)
MONOCYTES NFR BLD AUTO: 7 % (ref 4–12)
NEUTROPHILS # BLD AUTO: 3.21 THOUSANDS/ÂΜL (ref 1.85–7.62)
NEUTS SEG NFR BLD AUTO: 57 % (ref 43–75)
NRBC BLD AUTO-RTO: 0 /100 WBCS
PLATELET # BLD AUTO: 184 THOUSANDS/UL (ref 149–390)
PMV BLD AUTO: 9.6 FL (ref 8.9–12.7)
POTASSIUM SERPL-SCNC: 4.1 MMOL/L (ref 3.5–5.3)
PROT SERPL-MCNC: 7.5 G/DL (ref 6.4–8.4)
RBC # BLD AUTO: 5.24 MILLION/UL (ref 3.88–5.62)
SODIUM SERPL-SCNC: 140 MMOL/L (ref 135–147)
TRIGL SERPL-MCNC: 66 MG/DL (ref ?–150)
WBC # BLD AUTO: 5.6 THOUSAND/UL (ref 4.31–10.16)

## 2025-04-04 PROCEDURE — 85025 COMPLETE CBC W/AUTO DIFF WBC: CPT

## 2025-04-04 PROCEDURE — 36415 COLL VENOUS BLD VENIPUNCTURE: CPT

## 2025-04-04 PROCEDURE — 86803 HEPATITIS C AB TEST: CPT

## 2025-04-04 PROCEDURE — 80053 COMPREHEN METABOLIC PANEL: CPT

## 2025-04-04 PROCEDURE — 83036 HEMOGLOBIN GLYCOSYLATED A1C: CPT

## 2025-04-04 PROCEDURE — 80061 LIPID PANEL: CPT

## 2025-04-04 PROCEDURE — 87389 HIV-1 AG W/HIV-1&-2 AB AG IA: CPT

## 2025-04-04 PROCEDURE — 99396 PREV VISIT EST AGE 40-64: CPT | Performed by: INTERNAL MEDICINE

## 2025-04-04 NOTE — ASSESSMENT & PLAN NOTE
Continue current medical regimen.  Blood pressure is slightly higher today than usual.  Not really monitoring blood pressures.  He will be given monitoring his blood pressure at home without target being 130/80, particular in the face of prior intraparenchymal hemorrhage.  Orders:  •  CBC and differential; Future  •  Comprehensive metabolic panel; Future

## 2025-04-04 NOTE — ASSESSMENT & PLAN NOTE
No new issues.  Instructed blood pressure is most important part of his therapy at this time.  May want to be more aggressive with cholesterol, I have ordered test for that.

## 2025-04-04 NOTE — ASSESSMENT & PLAN NOTE
Due for his 2-year CTA and neurosurgical follow-up.  I have ordered this today.  Orders:  •  CTA head and neck w wo contrast; Future  •  Ambulatory Referral to Neurosurgery; Future

## 2025-04-04 NOTE — PROGRESS NOTES
Adult Annual Physical  Name: Jose Krueger      : 1975      MRN: [id number]  Encounter Provider: Jhon Kidd DO  Encounter Date: 2025   Encounter department: [facility]    :  Assessment & Plan  Annual physical exam  Discussed healthy lifestyle.  Need for exercise.  Discussed supplements and vitamin replacements.  Recommended adequate sleep.  Continue with at least yearly well visits, and follow-up on the immunizations and screening tools that we advised today.         Essential hypertension  Continue current medical regimen.  Blood pressure is slightly higher today than usual.  Not really monitoring blood pressures.  He will be given monitoring his blood pressure at home without target being 130/80, particular in the face of prior intraparenchymal hemorrhage.  Orders:  •  CBC and differential; Future  •  Comprehensive metabolic panel; Future    Vertebral artery aneurysm (HCC)  Due for his 2-year CTA and neurosurgical follow-up.  I have ordered this today.  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  Instructed blood pressure is most important part of his therapy at this time.  May want to be more aggressive with cholesterol, I have ordered test for that.       Screening for HIV (human immunodeficiency virus)  Agrees to screening  Orders:  •  HIV-1/HIV-2 Qualitative RNA; Future    Need for hepatitis C screening test  Agrees to screening  Orders:  •  Hepatitis C antibody; Future    Mixed hyperlipidemia  In the face of CVA history, will check lipid panel.  Orders:  •  Lipid Panel with Direct LDL reflex; Future    Elevated blood sugar    Orders:  •  Hemoglobin A1C; Future        Preventive Screenings:  - Diabetes Screening: risks/benefits discussed  - Cholesterol Screening: risks/benefits discussed   - Hepatitis C screening: risks/benefits  discussed   - HIV screening: risks/benefits discussed   - Colon cancer screening: screening up-to-date   - Lung cancer screening: screening not indicated   - Prostate cancer screening: risks/benefits discussed     Immunizations:  - Immunizations due: Influenza and Prevnar 20    Counseling/Anticipatory Guidance:    - Dental health: discussed importance of regular tooth brushing, flossing, and dental visits.   - Diet: discussed recommendations for a healthy/well-balanced diet.   - Exercise: the importance of regular exercise/physical activity was discussed. Recommend exercise 3-5 times per week for at least 30 minutes.          History of Present Illness     Adult Annual Physical:  Patient presents for annual physical. Patient feels reasonably well.  Denies any complaints.  Wondering what he can do to get off medications.  Stressing over his ill mother and brother.  The youngest in the family is taking on a lot of responsibilities.  He has no chest pain or shortness of breath.  No nausea vomiting or diarrhea.    Overdue for evaluation of his vertebral aneurysm as well as follow-up with neurosurgery.  Had seen neurology in the past as well.  He is doing quite well with the headaches ever since he has been on nortriptyline.    He intends on resuming his running schedule.  Last year he ran over 250 miles, he wants to reach 400 miles over the summer this year..     Diet and Physical Activity:  - Diet/Nutrition: intermittent fasting and well balanced diet.  - Exercise: 3-4 times a week on average.    Depression Screening:    - PHQ-9 Score: 0    General Health:  - Sleep: sleeps well and 7-8 hours of sleep on average.  - Hearing: normal hearing bilateral ears.  - Vision: no vision problems and most recent eye exam > 1 year ago.  - Dental: regular dental visits and no dental visits for > 1 year.    /GYN Health:    - History of STDs: no     Health:  - History of STDs: no.     Advanced Care Planning:  - Has an advanced  "directive?: no    - Has a durable medical POA?: no    - ACP document given to patient?: no      Review of Systems      Objective   /84   Pulse 76   Temp (!) 97.4 °F (36.3 °C) (Tympanic)   Ht 5' 8\" (1.727 m)   Wt 76.8 kg (169 lb 6.4 oz)   SpO2 97%   BMI 25.76 kg/m²     Physical Exam    "

## 2025-04-04 NOTE — TELEPHONE ENCOUNTER
Pt called in and wanted to schedule a f/u appt with Valerie Puga.    Pt saw his PCP provider yesterday and ordered him to get CTA Of Head and Neck w wo contrast. CTA of head and Neck scheduled for 4/14/25.    Pt was told by his PCP to f/u with neurology and please review CTA of head and Neck with Neurology regarding anneurysm in his neck.   Pt is now scheduled 7/16/25 at 1:30 pm with Muna Puga- Chris.  [place] address was provided.   Added to the wait list for sooner appt offer.     Patient wanted to see if its possible for him to be scheduled sooner?     Please assist and advise.     LOV 3/21/23 muna puga - ALL  -Return if symptoms worsen or fail to improve.

## 2025-04-05 LAB
HCV AB SER QL: NORMAL
HIV 1+2 AB+HIV1 P24 AG SERPL QL IA: NORMAL

## 2025-04-07 ENCOUNTER — RESULTS FOLLOW-UP (OUTPATIENT)
Dept: FAMILY MEDICINE CLINIC | Facility: CLINIC | Age: 50
End: 2025-04-07

## 2025-04-07 NOTE — TELEPHONE ENCOUNTER
Per PCP's note:    Due for his 2-year CTA and neurosurgical follow-up.  I have ordered this today.  Orders:    CTA head and neck w wo contrast; Future    Ambulatory Referral to Neurosurgery; Future      Patient does not need neurology follow up.

## 2025-04-07 NOTE — TELEPHONE ENCOUNTER
Called pt, no answer. Left detailed message (ok per consent on file) advising pt of Valerie's response and message that follow up is not needed at this time with Neurology.    Informed him of PCP's recommendation to follow up with Neurosurgery after CTA. Provided phone number to their offices.    Informed pt that his appt with Valerie will be canceled at this time and if any questions, advised pt to call back.    Appt canceled.

## 2025-04-07 NOTE — TELEPHONE ENCOUNTER
Patient returned call. The following message was relayed:        4/7/25 10:06 AM  Result Note  Notify patient, labs are all within normal limits.  His cholesterol is running slightly high, but would not change any medication at this time.  Diet and exercise.  All other labs were within normal limits.    Patient expressed understanding and had no further questions at this time.

## 2025-04-14 ENCOUNTER — HOSPITAL ENCOUNTER (OUTPATIENT)
Dept: CT IMAGING | Facility: HOSPITAL | Age: 50
Discharge: HOME | End: 2025-04-14
Attending: INTERNAL MEDICINE
Payer: COMMERCIAL

## 2025-04-14 DIAGNOSIS — I72.6 VERTEBRAL ARTERY ANEURYSM (HCC): ICD-10-CM

## 2025-04-14 PROCEDURE — 70496 CT ANGIOGRAPHY HEAD: CPT

## 2025-04-14 PROCEDURE — 70498 CT ANGIOGRAPHY NECK: CPT

## 2025-04-14 RX ADMIN — IOHEXOL 75 ML: 350 INJECTION, SOLUTION INTRAVENOUS at 06:57

## 2025-04-18 NOTE — ASSESSMENT & PLAN NOTE
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    On ASA 81mg daily indefinitiely  Continues with issues related to short-term memory and mild residual left-sided symptoms/coordination issues    Imaging:  CTA head/neck 4/14/25: No acute intracranial abnormality. Chronic hematoma cavity in the right lentiform nucleus. Unchanged posteriorly oriented 5 mm right V3 aneurysm.    Plan   Reviewed imaging with patient. Stable appearance to right vertebral artery pseudoaneurysm.   Continue ASA 81 mg.   No neurosurgical intervention is anticipated.   Recommend continued surveillance   Reviewed red flag signs and symptoms.  Follow up in 2 years with a repeat CTA head/neck to see AP.  Okay for virtual if imaging stable.  If imaging stable at that time, can consider spacing out to 3-year follow-up  Call sooner with any questions or concerns.    Orders:    Ambulatory Referral to Neurosurgery

## 2025-04-21 ENCOUNTER — TELEPHONE (OUTPATIENT)
Age: 50
End: 2025-04-21

## 2025-04-21 NOTE — TELEPHONE ENCOUNTER
Called patient back, advised it was ok to switch to a Virtual Visit for tomorrow.  Appt type changed.

## 2025-04-21 NOTE — TELEPHONE ENCOUNTER
Pt called requesting 4/22/25 visit be virtual, he has completed imaging CTA head and neck ordered by Dr Kidd , our order is still open on 4/14/25.    Can we advise pt please?    Does the patient have video capabilities with their device? (such as a smartphone, tablet, computer with webcam, etc)   Yes       Will the patient be present for the virtual appointment?  Yes

## 2025-04-22 ENCOUNTER — TELEMEDICINE (OUTPATIENT)
Dept: NEUROSURGERY | Facility: CLINIC | Age: 50
End: 2025-04-22
Attending: INTERNAL MEDICINE
Payer: COMMERCIAL

## 2025-04-22 DIAGNOSIS — I72.6 VERTEBRAL ARTERY ANEURYSM (HCC): Primary | ICD-10-CM

## 2025-04-22 PROCEDURE — 98004 SYNCH AUDIO-VIDEO EST SF 10: CPT | Performed by: PHYSICIAN ASSISTANT

## 2025-04-22 NOTE — PROGRESS NOTES
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    On ASA 81mg daily indefinitiely  Continues with issues related to short-term memory and mild residual left-sided symptoms/coordination issues    Imaging:  CTA head/neck 25: No acute intracranial abnormality. Chronic hematoma cavity in the right lentiform nucleus. Unchanged posteriorly oriented 5 mm right V3 aneurysm.    Plan   Reviewed imaging with patient. Stable appearance to right vertebral artery pseudoaneurysm.   Continue ASA 81 mg.   No neurosurgical intervention is anticipated.   Recommend continued surveillance   Reviewed red flag signs and symptoms.  Follow up in 2 years with a repeat CTA head/neck to see AP.  Okay for virtual if imaging stable.  If imaging stable at that time, can consider spacing out to 3-year follow-up  Call sooner with any questions or concerns.    Orders:    Ambulatory Referral to Neurosurgery        History of Present Illness     50 year old male with a PMH significant for large right IPH on 10/2019 when he presented with headaches/left sided weakness after heavy weight lifting. At that time, was found to have an incidental right vertebral artery pseudoaneurysm.  He continues on 81 mg aspirin daily.  Patient continues noting issues with short-term memory.  Denies headache, blurry or double vision.  He does still appreciate that the left side is just slightly delayed compared to the right since his stroke.       Review of Systems   Eyes:  Negative for visual disturbance.   Gastrointestinal: Negative.    Genitourinary:  Positive for enuresis.   Musculoskeletal:  Negative for  gait problem.   Neurological:  Negative for dizziness, tremors, seizures, syncope, speech difficulty, weakness, numbness and headaches.   Hematological:  Bruises/bleeds easily (asa81).   Psychiatric/Behavioral:  Negative for confusion, decreased concentration and sleep disturbance.        Objective   There were no vitals taken for this visit.    Physical Exam  Constitutional:       Appearance: Normal appearance.   Eyes:      Conjunctiva/sclera: Conjunctivae normal.   Pulmonary:      Effort: Pulmonary effort is normal.   Skin:     General: Skin is warm and dry.   Neurological:      Mental Status: He is alert.      GCS: GCS eye subscore is 4. GCS verbal subscore is 5. GCS motor subscore is 6.      Comments: No dysarthria.  EOMI, tongue midline, face symmetric, GONSALEZ x 4   Psychiatric:         Mood and Affect: Mood normal.         Behavior: Behavior normal.         Thought Content: Thought content normal.         Judgment: Judgment normal.         Administrative Statements   Encounter provider Monika Retana PA-C    The Patient is located at Home and in the following state in which I hold an active license [place].    The patient was identified by name and date of birth. Jose Krueger was informed that this is a telemedicine visit and that the visit is being conducted through the Epic Embedded platform. He agrees to proceed..  My office door was closed. No one else was in the room.  He acknowledged consent and understanding of privacy and security of the video platform. The patient has agreed to participate and understands they can discontinue the visit at any time.    I have spent a total time of 15 minutes in caring for this patient on the day of the visit/encounter including Diagnostic results, Instructions for management, Patient and family education, Importance of tx compliance, Impressions, Counseling / Coordination of care, Documenting in the medical record, Reviewing/placing orders in the medical record (including  tests, medications, and/or procedures), and Obtaining or reviewing history  , not including the time spent for establishing the audio/video connection.

## 2025-04-28 DIAGNOSIS — I10 ESSENTIAL HYPERTENSION: ICD-10-CM

## 2025-04-29 RX ORDER — METOPROLOL SUCCINATE 50 MG/1
50 TABLET, EXTENDED RELEASE ORAL
Qty: 100 TABLET | Refills: 1 | Status: SHIPPED | OUTPATIENT
Start: 2025-04-29

## 2025-04-29 RX ORDER — AMLODIPINE BESYLATE 10 MG/1
10 TABLET ORAL DAILY
Qty: 100 TABLET | Refills: 1 | Status: SHIPPED | OUTPATIENT
Start: 2025-04-29

## 2025-07-28 DIAGNOSIS — R51.9 CHRONIC HEADACHE: ICD-10-CM

## 2025-07-28 DIAGNOSIS — G89.29 CHRONIC HEADACHE: ICD-10-CM

## 2025-07-30 RX ORDER — NORTRIPTYLINE HYDROCHLORIDE 10 MG/1
CAPSULE ORAL
Qty: 100 CAPSULE | Refills: 0 | Status: SHIPPED | OUTPATIENT
Start: 2025-07-30